# Patient Record
Sex: MALE | Race: WHITE
[De-identification: names, ages, dates, MRNs, and addresses within clinical notes are randomized per-mention and may not be internally consistent; named-entity substitution may affect disease eponyms.]

---

## 2020-08-28 ENCOUNTER — HOSPITAL ENCOUNTER (INPATIENT)
Dept: HOSPITAL 97 - ER | Age: 50
LOS: 11 days | Discharge: HOME | DRG: 871 | End: 2020-09-08
Attending: HOSPITALIST | Admitting: HOSPITALIST
Payer: COMMERCIAL

## 2020-08-28 VITALS — BODY MASS INDEX: 16.7 KG/M2

## 2020-08-28 DIAGNOSIS — F15.10: ICD-10-CM

## 2020-08-28 DIAGNOSIS — E27.40: ICD-10-CM

## 2020-08-28 DIAGNOSIS — G92: ICD-10-CM

## 2020-08-28 DIAGNOSIS — E87.6: ICD-10-CM

## 2020-08-28 DIAGNOSIS — K44.9: ICD-10-CM

## 2020-08-28 DIAGNOSIS — R13.10: ICD-10-CM

## 2020-08-28 DIAGNOSIS — R79.89: ICD-10-CM

## 2020-08-28 DIAGNOSIS — Z20.828: ICD-10-CM

## 2020-08-28 DIAGNOSIS — D63.8: ICD-10-CM

## 2020-08-28 DIAGNOSIS — E11.649: ICD-10-CM

## 2020-08-28 DIAGNOSIS — N39.0: ICD-10-CM

## 2020-08-28 DIAGNOSIS — A41.9: Primary | ICD-10-CM

## 2020-08-28 DIAGNOSIS — E83.42: ICD-10-CM

## 2020-08-28 DIAGNOSIS — E83.51: ICD-10-CM

## 2020-08-28 DIAGNOSIS — K26.9: ICD-10-CM

## 2020-08-28 DIAGNOSIS — J96.00: ICD-10-CM

## 2020-08-28 DIAGNOSIS — E43: ICD-10-CM

## 2020-08-28 DIAGNOSIS — K29.80: ICD-10-CM

## 2020-08-28 DIAGNOSIS — D61.82: ICD-10-CM

## 2020-08-28 DIAGNOSIS — Z96.41: ICD-10-CM

## 2020-08-28 DIAGNOSIS — T43.621A: ICD-10-CM

## 2020-08-28 DIAGNOSIS — B96.20: ICD-10-CM

## 2020-08-28 DIAGNOSIS — K25.9: ICD-10-CM

## 2020-08-28 DIAGNOSIS — K22.2: ICD-10-CM

## 2020-08-28 DIAGNOSIS — R65.20: ICD-10-CM

## 2020-08-28 DIAGNOSIS — F10.10: ICD-10-CM

## 2020-08-28 DIAGNOSIS — J69.0: ICD-10-CM

## 2020-08-28 LAB
ALBUMIN SERPL BCP-MCNC: 2.6 G/DL (ref 3.4–5)
ALP SERPL-CCNC: 538 U/L (ref 45–117)
ALT SERPL W P-5'-P-CCNC: 105 U/L (ref 12–78)
AST SERPL W P-5'-P-CCNC: 71 U/L (ref 15–37)
BUN BLD-MCNC: 11 MG/DL (ref 7–18)
GIANT PLATELETS BLD QL SMEAR: PRESENT
GLUCOSE SERPLBLD-MCNC: 86 MG/DL (ref 74–106)
HCT VFR BLD CALC: 36.1 % (ref 39.6–49)
INR BLD: 1.19
LIPASE SERPL-CCNC: 17 U/L (ref 73–393)
LYMPHOCYTES # SPEC AUTO: 0.2 K/UL (ref 0.7–4.9)
MAGNESIUM SERPL-MCNC: 1.7 MG/DL (ref 1.8–2.4)
MORPHOLOGY BLD-IMP: (no result)
NT-PROBNP SERPL-MCNC: 487 PG/ML (ref ?–125)
PMV BLD: 7.3 FL (ref 7.6–11.3)
POTASSIUM SERPL-SCNC: 3.1 MMOL/L (ref 3.5–5.1)
RBC # BLD: 4.01 M/UL (ref 4.33–5.43)
TROPONIN (EMERG DEPT USE ONLY): < 0.02 NG/ML (ref 0–0.04)

## 2020-08-28 PROCEDURE — 83735 ASSAY OF MAGNESIUM: CPT

## 2020-08-28 PROCEDURE — 86901 BLOOD TYPING SEROLOGIC RH(D): CPT

## 2020-08-28 PROCEDURE — 93306 TTE W/DOPPLER COMPLETE: CPT

## 2020-08-28 PROCEDURE — 84132 ASSAY OF SERUM POTASSIUM: CPT

## 2020-08-28 PROCEDURE — 83615 LACTATE (LD) (LDH) ENZYME: CPT

## 2020-08-28 PROCEDURE — 36415 COLL VENOUS BLD VENIPUNCTURE: CPT

## 2020-08-28 PROCEDURE — 80307 DRUG TEST PRSMV CHEM ANLYZR: CPT

## 2020-08-28 PROCEDURE — 87040 BLOOD CULTURE FOR BACTERIA: CPT

## 2020-08-28 PROCEDURE — 93005 ELECTROCARDIOGRAM TRACING: CPT

## 2020-08-28 PROCEDURE — 84075 ASSAY ALKALINE PHOSPHATASE: CPT

## 2020-08-28 PROCEDURE — 99285 EMERGENCY DEPT VISIT HI MDM: CPT

## 2020-08-28 PROCEDURE — 85730 THROMBOPLASTIN TIME PARTIAL: CPT

## 2020-08-28 PROCEDURE — 83525 ASSAY OF INSULIN: CPT

## 2020-08-28 PROCEDURE — 82746 ASSAY OF FOLIC ACID SERUM: CPT

## 2020-08-28 PROCEDURE — 85044 MANUAL RETICULOCYTE COUNT: CPT

## 2020-08-28 PROCEDURE — 83540 ASSAY OF IRON: CPT

## 2020-08-28 PROCEDURE — 83605 ASSAY OF LACTIC ACID: CPT

## 2020-08-28 PROCEDURE — 87077 CULTURE AEROBIC IDENTIFY: CPT

## 2020-08-28 PROCEDURE — 84439 ASSAY OF FREE THYROXINE: CPT

## 2020-08-28 PROCEDURE — 31500 INSERT EMERGENCY AIRWAY: CPT

## 2020-08-28 PROCEDURE — 71045 X-RAY EXAM CHEST 1 VIEW: CPT

## 2020-08-28 PROCEDURE — 87070 CULTURE OTHR SPECIMN AEROBIC: CPT

## 2020-08-28 PROCEDURE — 92611 MOTION FLUOROSCOPY/SWALLOW: CPT

## 2020-08-28 PROCEDURE — 84145 PROCALCITONIN (PCT): CPT

## 2020-08-28 PROCEDURE — 83010 ASSAY OF HAPTOGLOBIN QUANT: CPT

## 2020-08-28 PROCEDURE — 82024 ASSAY OF ACTH: CPT

## 2020-08-28 PROCEDURE — 85025 COMPLETE CBC W/AUTO DIFF WBC: CPT

## 2020-08-28 PROCEDURE — 82947 ASSAY GLUCOSE BLOOD QUANT: CPT

## 2020-08-28 PROCEDURE — 86900 BLOOD TYPING SEROLOGIC ABO: CPT

## 2020-08-28 PROCEDURE — 81015 MICROSCOPIC EXAM OF URINE: CPT

## 2020-08-28 PROCEDURE — 87086 URINE CULTURE/COLONY COUNT: CPT

## 2020-08-28 PROCEDURE — 84681 ASSAY OF C-PEPTIDE: CPT

## 2020-08-28 PROCEDURE — 51702 INSERT TEMP BLADDER CATH: CPT

## 2020-08-28 PROCEDURE — 80202 ASSAY OF VANCOMYCIN: CPT

## 2020-08-28 PROCEDURE — 74177 CT ABD & PELVIS W/CONTRAST: CPT

## 2020-08-28 PROCEDURE — 97161 PT EVAL LOW COMPLEX 20 MIN: CPT

## 2020-08-28 PROCEDURE — 85018 HEMOGLOBIN: CPT

## 2020-08-28 PROCEDURE — 85014 HEMATOCRIT: CPT

## 2020-08-28 PROCEDURE — 82607 VITAMIN B-12: CPT

## 2020-08-28 PROCEDURE — 84443 ASSAY THYROID STIM HORMONE: CPT

## 2020-08-28 PROCEDURE — 87205 SMEAR GRAM STAIN: CPT

## 2020-08-28 PROCEDURE — 97530 THERAPEUTIC ACTIVITIES: CPT

## 2020-08-28 PROCEDURE — 80053 COMPREHEN METABOLIC PANEL: CPT

## 2020-08-28 PROCEDURE — 87088 URINE BACTERIA CULTURE: CPT

## 2020-08-28 PROCEDURE — 83690 ASSAY OF LIPASE: CPT

## 2020-08-28 PROCEDURE — 80074 ACUTE HEPATITIS PANEL: CPT

## 2020-08-28 PROCEDURE — 80329 ANALGESICS NON-OPIOID 1 OR 2: CPT

## 2020-08-28 PROCEDURE — 84484 ASSAY OF TROPONIN QUANT: CPT

## 2020-08-28 PROCEDURE — 82805 BLOOD GASES W/O2 SATURATION: CPT

## 2020-08-28 PROCEDURE — 94660 CPAP INITIATION&MGMT: CPT

## 2020-08-28 PROCEDURE — 80076 HEPATIC FUNCTION PANEL: CPT

## 2020-08-28 PROCEDURE — 86850 RBC ANTIBODY SCREEN: CPT

## 2020-08-28 PROCEDURE — 87389 HIV-1 AG W/HIV-1&-2 AB AG IA: CPT

## 2020-08-28 PROCEDURE — 97116 GAIT TRAINING THERAPY: CPT

## 2020-08-28 PROCEDURE — 94002 VENT MGMT INPAT INIT DAY: CPT

## 2020-08-28 PROCEDURE — 87186 SC STD MICRODIL/AGAR DIL: CPT

## 2020-08-28 PROCEDURE — 82533 TOTAL CORTISOL: CPT

## 2020-08-28 PROCEDURE — 83880 ASSAY OF NATRIURETIC PEPTIDE: CPT

## 2020-08-28 PROCEDURE — 94003 VENT MGMT INPAT SUBQ DAY: CPT

## 2020-08-28 PROCEDURE — 80048 BASIC METABOLIC PNL TOTAL CA: CPT

## 2020-08-28 PROCEDURE — 85610 PROTHROMBIN TIME: CPT

## 2020-08-28 PROCEDURE — 74230 X-RAY XM SWLNG FUNCJ C+: CPT

## 2020-08-28 PROCEDURE — 84100 ASSAY OF PHOSPHORUS: CPT

## 2020-08-28 PROCEDURE — 87522 HEPATITIS C REVRS TRNSCRPJ: CPT

## 2020-08-28 PROCEDURE — 84134 ASSAY OF PREALBUMIN: CPT

## 2020-08-28 PROCEDURE — 81003 URINALYSIS AUTO W/O SCOPE: CPT

## 2020-08-28 PROCEDURE — 80320 DRUG SCREEN QUANTALCOHOLS: CPT

## 2020-08-28 NOTE — XMS REPORT
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                                                    [facility]



     Phosphate      03:51:                                              Elizabe



     (Tylenol      00                                                th



     #4) 1 Each                                                        



     TAB                                                         

 

     Dicyclomine      2020-0      No             10mg                     [facility]

[facility]



     Hcl       8-21                                              [facility]



     (Bentyl) 10      02:21:                                              [facility]

e



     Mg CAP      00                                                th

 

     Omeprazole      2020-0      No             20mg                     [facility]



     (Prilosec)      8-21                                              [facility]



     20 Mg CPDR      02:21:                                              [facility]



               00                                                th

 

     Ondansetron      2020-0      No             4mg                      [facility]

[facility]



     Hcl       8-21                                              [facility]



     (Zofran) 4      02:21:                                              [facility]



     Mg TAB      00                                                th

 

     Insulin      2020-0      No             20                       [facility]



     Glargine      4-20                                              [facility]



     (Lantus      07:34:                                              [facility]



     U-100) 100      00                                                th



     Unit/1 Ml                                                        



     SOLN                                                        

 

     Pantoprazol      2020-0      No             40mg                     [facility]

[facility]



     e         4-20                                              [facility]



     (Protonix)      07:34:                                              [facility]



     40 Mg TABEC      00                                                th

 

     Insulin      2020-0      No             20                       [facility]



     Glargine      4-20                                              [facility]



     (Lantus      07:34:                                              [facility]



     U-100) 100      00                                                th



     Unit/1 Ml                                                        



     SOLN                                                        

 

     Pantoprazol      2020-0      No             40mg                     [facility]

[facility]



     e         4-20                                              [facility]



     (Protonix)      07:34:                                              [facility]



     40 Mg TABEC      00                                                th

 

     Insulin      2020-0      No             20                       [facility]



     Glargine      4-20                                              [facility]



     (Lantus      07:34:                                              [facility]



     U-100) 100      00                                                th



     Unit/1 Ml                                                        



     SOLN                                                        

 

     Pantoprazol      2020-0      No             40mg                     [facility]

[facility]



     e         4-20                                              [facility]



     (Protonix)      07:34:                                              [facility]



     40 Mg TABEC      00                                                th

 

     Acetaminoph      2020-0      No             1                        [facility]

[facility]



     en/Hydrocod      4-04                                              [facility]



     one Bitart      09:57:                                              [facility]



     (Norco      ) 1                                                        



     Tab TAB                                                        

 

     Acetaminoph      2020-0      No             1                        [facility]

[facility]



     en/Hydrocod      4-04                                              [facility]



     one Bitart      09:57:                                              [facility]



     (Norco      ) 1                                                        



     Tab TAB                                                        

 

     Acetaminoph      2020-0      No             1                        [facility]

[facility]



     en/Hydrocod      4-04                                              [facility]



     one Bitart      09:57:                                              [facility]



     (Norco      ) 1                                                        



     Tab TAB                                                        

 

     Acetaminoph      2020-0      No             1                        [facility]

[facility]



     en/Hydrocod      4-04                                              [facility]



     one Bitart      09:57:                                              [facility]



     (Norco      00                                                th



     5/325) 1                                                        



     Tab TAB                                                        

 

     Aspirin      2020-0      No             81mg                     [facility]



     (Aspirin      4-04                                              [facility]



     Chewable)      09:56:                                              [facility]



     81 Mg CHEW      00                                                th

 

     Clopidogrel      2020-0      No             75mg                     [facility]

[facility]



     Bisulfate      4-04                                              [facility]



     (Plavix) 75      09:56:                                              [facility]

e



     Mg TAB      00                                                th

 

     Metoprolol      2020-0      No             25mg                     [facility]



     Succinate      4-04                                              [facility]



     (Toprol Xl)      09:56:                                              [facility]

e



     25 Mg TABSR      00                                                th

 

     Saccharomyc      2020-0      No             250mg                     [facility]

TU



     es        4-04                                              [facility]



     Boulardii      09:56:                                              [facility]



     (Florastor)      00                                                th



     250 Mg                                                        



     CAPSULE                                                        

 

     Vancomycin      2020-0      No             125mg                     [facility]

[facility]



     Hcl       4-04                                              [facility]



     (Vancocin)      09:56:                                              [facility]



     125 Mg      00                                                th



     CAPSULE                                                        

 

     Aspirin      2020-0      No             81mg                     [facility]



     (Aspirin      4-04                                              [facility]



     Chewable)      09:56:                                              [facility]



     81 Mg CHEW      00                                                th

 

     Clopidogrel      2020-0      No             75mg                     [facility]

[facility]



     Bisulfate      4-04                                              [facility]



     (Plavix) 75      09:56:                                              [facility]

e



     Mg TAB      00                                                th

 

     Metoprolol      2020-0      No             25mg                     [facility]



     Succinate      4-04                                              [facility]



     (Toprol Xl)      09:56:                                              [facility]

e



     25 Mg TABSR      00                                                th

 

     Saccharomyc      2020-0      No             250mg                     [facility]

TU



     es        4-04                                              [facility]



     Boulardii      09:56:                                              [facility]



     (Florastor)      00                                                th



     250 Mg                                                        



     CAPSULE                                                        

 

     Aspirin      2020-0      No             81mg                     [facility]



     (Aspirin      4-04                                              [facility]



     Chewable)      09:56:                                              [facility]



     81 Mg CHEW      00                                                th

 

     Clopidogrel      2020-0      No             75mg                     [facility]

[facility]



     Bisulfate      4-04                                              [facility]



     (Plavix) 75      09:56:                                              [facility]

e



     Mg TAB      00                                                th

 

     Metoprolol      2020-0      No             25mg                     [facility]



     Succinate      4-04                                              [facility]



     (Toprol Xl)      09:56:                                              [facility]

e



     25 Mg TABSR      00                                                th

 

     Saccharomyc      2020-0      No             250mg                     [facility]

TU



     es        4-04                                              [facility]



     Boulardii      09:56:                                              [facility]



     (Florastor)      00                                                th



     250 Mg                                                        



     CAPSULE                                                        

 

     Aspirin      2020-0      No             81mg                     [facility]



     (Aspirin      -04                                              [facility]



     Chewable)      09:56:                                              [facility]



     81 Mg CHEW      00                                                th

 

     Clopidogrel      -0      No             75mg                     [facility]

[facility]



     Bisulfate      -04                                              [facility]



     (Plavix) 75      09:56:                                              [facility]

e



     Mg TAB      00                                                th

 

     Metoprolol      -0      No             25mg                     [facility]



     Succinate      4-04                                              [facility]



     (Toprol Xl)      09:56:                                              [facility]

e



     25 Mg TABSR                                                      th

 

     Saccharomyc      -0      No             250mg                     [facility]

TU



     es        4-04                                              [facility]



     Boulardii      09:56:                                              [facility]



     (Florastor)      



     250 Mg                                                        



     CAPSULE                                                        

 

     Acetaminoph      -0      No             1                        [facility]

[facility]



     en/Codeine      2-05                                              [facility]



     Phosphate      10:40:                                              [facility]



     (Tylenol      



     #3) 1 Tab                                                        



     TAB                                                         

 

     Fluoxetine      -0      No             40mg                     [facility]



     Hcl       1-17                                              [facility]



     (Prozac) 20      08:56:                                              [facility]

e



     Mg CAP      00                                                

 

     Fluoxetine      -0      No             40mg                     [facility]



     Hcl       1-17                                              [facility]



     (Prozac) 20      08:56:                                              [facility]

e



     Mg CAP      00                                                

 

     Fluoxetine      -0      No             40mg                     [facility]



     Hcl       1-17                                              [facility]



     (Prozac) 20      08:56:                                              [facility]

e



     Mg CAP      00                                                th

 

     Fluoxetine      -0      No             40mg                     [facility]



     Hcl       1-17                                              [facility]



     (Prozac) 20      08:56:                                              [facility]

e



     Mg CAP      

 

     Fluoxetine      -0      No             40mg                     [facility]



     Hcl       1-17                                              [facility]



     (Prozac) 20      08:56:                                              [facility]

e



     Mg CAP      00                                                

 

     Fluoxetine      -0      No             40mg                     [facility]



     Hcl       1-17                                              [facility]



     (Prozac) 20      08:56:                                              [facility]

e



     Mg CAP      00                                                

 

     Fluoxetine      -0      No             40mg                     [facility]



     Hcl       1-17                                              [facility]



     (Prozac) 20      08:56:                                              [facility]

e



     Mg CAP      00                                                th

 

     Amoxicillin      2019      No             875mg                     [facility]

TU



     /Clavulanat      2-02                                              [facility]



     e Potassium      11:29:                                              [facility]

e



     (Augmentin      



     875 Mg) 1                                                        



     Each TABLET                                                        

 

     Insulin      2019      No             30                       [facility]



     Glargine      2-02                                              [facility]



     (Lantus      11:14:                                              [facility]



     U-100) 100      00                                                th



     Unit/1 Ml                                                        



     SOLN                                                        

 

     Insulin      2019-      No             5                        [facility]



     Human      2-02                                              [facility]



     Lispro      11:14:                                              [facility]



     (Humalog      00                                                th



     Inj (3ML                                                        



     Vial)) 100                                                        



     Unit/1 Ml                                                        



     INJ                                                         

 

     Insulin      2019-      No             30                       [facility]



     Glargine      2-02                                              [facility]



     (Lantus      11:14:                                              [facility]



     U-100) 100      00                                                th



     Unit/1 Ml                                                        



     SOLN                                                        

 

     Insulin      2019-      No             5                        [facility]



     Human      2-02                                              [facility]



     Lispro      11:14:                                              [facility]



     (Humalog      00                                                th



     Inj (3ML                                                        



     Vial)) 100                                                        



     Unit/1 Ml                                                        



     INJ                                                         

 

     Insulin      2019-      No             30                       [facility]



     Glargine      2-02                                              [facility]



     (Lantus      11:14:                                              [facility]



     U-100) 100      00                                                th



     Unit/1 Ml                                                        



     SOLN                                                        

 

     Insulin      -      No             5                        [facility]



     Human      2-02                                              [facility]



     Lispro      11:14:                                              [facility]



     (Humalog      00                                                th



     Inj (3ML                                                        



     Vial)) 100                                                        



     Unit/1 Ml                                                        



     INJ                                                         

 

     Insulin      2019-      No             30                       [facility]



     Glargine      2-02                                              [facility]



     (Lantus      11:14:                                              [facility]



     U-100) 100      00                                                th



     Unit/1 Ml                                                        



     SOLN                                                        

 

     Insulin      2019-      No             5                        [facility]



     Human      2-02                                              [facility]



     Lispro      11:14:                                              [facility]



     (Humalog      00                                                th



     Inj (3ML                                                        



     Vial)) 100                                                        



     Unit/1 Ml                                                        



     INJ                                                         

 

     Insulin      -      No             30                       [facility]



     Glargine      2-02                                              [facility]



     (Lantus      11:14:                                              [facility]



     U-100) 100      00                                                th



     Unit/1 Ml                                                        



     SOLN                                                        

 

     Insulin      2019-      No             5                        [facility]



     Human      2-02                                              [facility]



     Lispro      11:14:                                              [facility]



     (Humalog      00                                                th



     Inj (3ML                                                        



     Vial)) 100                                                        



     Unit/1 Ml                                                        



     INJ                                                         

 

     Insulin      2019-      No             5                        [facility]



     Human      2-02                                              [facility]



     Lispro      11:14:                                              [facility]



     (Humalog      00                                                th



     Inj (3ML                                                        



     Vial)) 100                                                        



     Unit/1 Ml                                                        



     INJ                                                         

 

     Insulin      -      No             5                        [facility]



     Human      2-02                                              [facility]



     Lispro      11:14:                                              [facility]



     (Humalog      00                                                th



     Inj (3ML                                                        



     Vial)) 100                                                        



     Unit/1 Ml                                                        



     INJ                                                         

 

     Insulin      2019-      No             5                        [facility]



     Human      2-02                                              [facility]



     Lispro      11:14:                                              [facility]



     (Humalog      



     Inj (3ML                                                        



     Vial)) 100                                                        



     Unit/1 Ml                                                        



     INJ                                                         

 

     Ondansetron      2013-0      No             4mg                      [facility]

[facility]



     Hcl       8-20                                              [facility]



     (Zofran) 4      19:17:                                              [facility]



     Mg TAB      00                                                th

 

     Ondansetron      2013-0      No             4mg                      [facility]

[facility]



     Hcl       8-20                                              [facility]



     (Zofran) 4      19:17:                                              [facility]



     Mg TAB                                                      th

 

     Ondansetron      2013-0      No             4mg                      [facility]

[facility]



     Hcl       8-20                                              [facility]



     (Zofran) 4      19:17:                                              [facility]



     Mg TAB      

 

     Ondansetron      2013-0      No             4mg                      [facility]

[facility]



     Hcl       8-20                                              [facility]



     (Zofran) 4      19:17:                                              [facility]



     Mg TAB                                                      th

 

     Ondansetron      2013-0      No             4mg                      [facility]

[facility]



     Hcl       8-20                                              [facility]



     (Zofran) 4      19:17:                                              [facility]



     Mg TAB      

 

     Ondansetron      2013-0      No             4mg                      [facility]

[facility]



     Hcl       8-20                                              [facility]



     (Zofran) 4      19:17:                                              [facility]



     Mg TAB      

 

     Ondansetron      2013-0      No             4mg                      [facility]

[facility]



     Hcl       8-20                                              [facility]



     (Zofran) 4      19:17:                                              [facility]



     Mg TAB      

 

     Ondansetron      2013-0      No             4mg                      [facility]

[facility]



     Hcl       8-20                                              [facility]



     (Zofran) 4      19:17:                                              [facility]



     Mg TAB      

 

     Acetaminoph      -0      No             1                        [facility]

[facility]



     en/Hydrocod      3-25                                              [facility]



     one Bitart      16:02:                                              [facility]



     (Norco      ) 1                                                        



     Tab TAB                                                        

 

     Acetaminoph      -0      No             1                        [facility]

[facility]



     en/Hydrocod      3-25                                              [facility]



     one Bitart      16:02:                                              [facility]



     (Norco      ) 1                                                        



     Tab TAB                                                        

 

     Acetaminoph      -0      No             1                        [facility]

[facility]



     en/Hydrocod      3-25                                              [facility]



     one Bitart      16:02:                                              [facility]



     (Norco      ) 1                                                        



     Tab TAB                                                        

 

     Acetaminoph      -0      No             1                        [facility]

U



     en/Hydrocod      3-25                                              [facility]



     one Bitart      16:02:                                              [facility]



     (Norco      ) 1                                                        



     Tab TAB                                                        

 

     Amlodipine                No             10mg                     [facility]



     Besylate                                                        [facility]



     (Norvasc)                                                        [facility]



     10 Mg TAB                                                        th

 

     Fluoxetine                No             20mg                     [facility]



     Hcl                                                         [facility]



     (Prozac) 20                                                        [facility]



     Mg CAP                                                        th

 

     Gabapentin                No             600mg                     [facility]



     (Neurontin)                                                        [facility]



     600 Mg TAB                                                        [facility]



                                                                 th

 

     Hydroxychlo                No             200mg                     [facility]



     roquine                                                        [facility]



     Sulfate                                                        [facility]



     (Plaquenil)                                                        th



     200 Mg TAB                                                        

 

     Lisinopril                No             40mg                     [facility]



     (Zestril)                                                        [facility]



     40 Mg TAB                                                        [facility]



                                                                 th

 

     Meloxicam                No             15mg                     [facility]



     (Mobic) 15                                                        [facility]



     Mg TAB                                                        [facility]



                                                                 th

 

     Simvastatin                No             40mg                     [facility]



     (Zocor) 40                                                        [facility]



     Mg TAB                                                        [facility]



                                                                 th

 

     Trazodone                No             50mg                     [facility]



     Hcl                                                         [facility]



     (Desyrel)                                                        [facility]



     50 Mg TAB                                                        th

 

     Amlodipine                No             10mg                     [facility]



     Besylate                                                        [facility]



     (Norvasc)                                                        [facility]



     10 Mg TAB                                                        th

 

     Gabapentin                No             600mg                     [facility]



     (Neurontin)                                                        [facility]



     600 Mg TAB                                                        [facility]



                                                                 th

 

     Hydroxychlo                No             200mg                     [facility]



     roquine                                                        [facility]



     Sulfate                                                        [facility]



     (Plaquenil)                                                        th



     200 Mg TAB                                                        

 

     Lisinopril                No             40mg                     [facility]



     (Zestril)                                                        [facility]



     40 Mg TAB                                                        [facility]



                                                                 th

 

     Simvastatin                No             40mg                     [facility]



     (Zocor) 40                                                        [facility]



     Mg TAB                                                        [facility]



                                                                 th

 

     Trazodone                No             50mg                     [facility]



     Hcl                                                         [facility]



     (Desyrel)                                                        [facility]



     50 Mg TAB                                                        th

 

     Amlodipine                No             10mg                     [facility]



     Besylate                                                        [facility] - [facility]



     (Norvasc)                                                        [facility]



     10 Mg TAB                                                        th

 

     Gabapentin                No             600mg                     [facility]



     (Neurontin)                                                        [facility]



     600 Mg TAB                                                        [facility]



                                                                 th

 

     Hydroxychlo                No             200mg                     [facility]



     roquine                                                        [facility] - [facility]



     Sulfate                                                        [facility]



     (Plaquenil)                                                        th



     200 Mg TAB                                                        

 

     Lisinopril                No             40mg                     [facility]



     (Zestril)                                                        [facility]



     40 Mg TAB                                                        [facility]



                                                                 th

 

     Simvastatin                No             40mg                     [facility]



     (Zocor) 40                                                        [facility]



     Mg TAB                                                        [facility]



                                                                 th

 

     Trazodone                No             50mg                     [facility]



     Hcl                                                         [facility] - [facility]



     (Desyrel)                                                        [facility]



     50 Mg TAB                                                        th

 

     Amlodipine                No             10mg                     [facility]



     Besylate                                                        [facility] - [facility]



     (Norvasc)                                                        [facility]



     10 Mg TAB                                                        th

 

     Gabapentin                No             600mg                     [facility]



     (Neurontin)                                                        [facility]



     600 Mg TAB                                                        [facility]



                                                                 th

 

     Hydroxychlo                No             200mg                     [facility]



     roquine                                                        [facility] - [facility]



     Sulfate                                                        [facility]



     (Plaquenil)                                                        th



     200 Mg TAB                                                        

 

     Lisinopril                No             40mg                     [facility]



     (Zestril)                                                        [facility]



     40 Mg TAB                                                        [facility]



                                                                 th

 

     Simvastatin                No             40mg                     [facility]



     (Zocor) 40                                                        [facility]



     Mg TAB                                                        [facility]



                                                                 th

 

     Trazodone                No             50mg                     [facility]



     Hcl                                                         [facility] - [facility]



     (Desyrel)                                                        [facility]



     50 Mg TAB                                                        th

 

     Amlodipine                No             10mg                     [facility]



     Besylate                                                        [facility]



     (Norvasc)                                                        [facility]



     10 Mg TAB                                                        th

 

     Gabapentin                No             600mg                     [facility]



     (Neurontin)                                                        [facility]



     600 Mg TAB                                                        [facility]



                                                                 th

 

     Hydroxychlo                No             200mg                     [facility]



     roquine                                                        [facility] - [facility]



     Sulfate                                                        [facility]



     (Plaquenil)                                                        th



     200 Mg TAB                                                        

 

     Simvastatin                No             40mg                     [facility]



     (Zocor) 40                                                        [facility]



     Mg TAB                                                        [facility]



                                                                 th

 

     Trazodone                No             50mg                     [facility]



     Hcl                                                         [facility]



     (Desyrel)                                                        [facility]



     50 Mg TAB                                                        th

 

     Amlodipine                No             10mg                     [facility]



     Besylate                                                        [facility]



     (Norvasc)                                                        [facility]



     10 Mg TAB                                                        th

 

     Gabapentin                No             600mg                     [facility]



     (Neurontin)                                                        [facility]



     600 Mg TAB                                                        [facility]



                                                                 th

 

     Hydroxychlo                No             200mg                     [facility]



     roquine                                                        [facility] - [facility]



     Sulfate                                                        [facility]



     (Plaquenil)                                                        th



     200 Mg TAB                                                        

 

     Simvastatin                No             40mg                     [facility]



     (Zocor) 40                                                        [facility]



     Mg TAB                                                        [facility]



                                                                 th

 

     Trazodone                No             50mg                     [facility]



     Hcl                                                         [facility] - [facility]



     (Desyrel)                                                        [facility]



     50 Mg TAB                                                        th

 

     Amlodipine                No             10mg                     [facility]



     Besylate                                                        [facility] - [facility]



     (Norvasc)                                                        [facility]



     10 Mg TAB                                                        th

 

     Gabapentin                No             600mg                     [facility]



     (Neurontin)                                                        [facility]



     600 Mg TAB                                                        [facility]



                                                                 th

 

     Hydroxychlo                No             200mg                     [facility]



     roquine                                                        [facility] - [facility]



     Sulfate                                                        [facility]



     (Plaquenil)                                                        th



     200 Mg TAB                                                        

 

     Meloxicam                No             15mg                     [facility]



     (Mobic) 15                                                        [facility]



     Mg TAB                                                        [facility]



                                                                 th

 

     Simvastatin                No             40mg                     [facility]



     (Zocor) 40                                                        [facility]



     Mg TAB                                                        [facility]



                                                                 th

 

     Trazodone                No             50mg                     [facility]



     Hcl                                                         [facility] - [facility]



     (Desyrel)                                                        [facility]



     50 Mg TAB                                                        th

 

     Vancomycin                No             125mg                     [facility]



     Hcl                                                         [facility]



     (Vancocin)                                                        [facility]



     125 Mg                                                        th



     CAPSULE                                                        

 

     Amlodipine                No             10mg                     [facility]



     Besylate                                                        [facility]



     (Norvasc)                                                        [facility]



     10 Mg TAB                                                        th

 

     Gabapentin                No             600mg                     [facility]



     (Neurontin)                                                        [facility]



     600 Mg TAB                                                        [facility]



                                                                 th

 

     Hydroxychlo                No             200mg                     [facility]



     roquine                                                        [facility] - [facility]



     Sulfate                                                        [facility]



     (Plaquenil)                                                        th



     200 Mg TAB                                                        

 

     Meloxicam                No             15mg                     [facility]



     (Mobic) 15                                                        [facility]



     Mg TAB                                                        [facility]



                                                                 th

 

     Simvastatin                No             40mg                     [facility]



     (Zocor) 40                                                        [facility]



     Mg TAB                                                        [facility]



                                                                 th

 

     Trazodone                No             50mg                     [facility]



     Hcl                                                         [facility] - [facility]



     (Desyrel)                                                        [facility]



     50 Mg TAB                                                        th

 

     Vancomycin                No             125mg                     [facility]



     Hcl                                                         [facility] - [facility]



     (Vancocin)                                                        [facility]



     125 Mg                                                        th



     CAPSULE                                                        







Vital Signs







             Vital Name   Observation Time Observation Value Comments     Source

 

             Body Temperature 2020 04:54:00 98.1 [degF]               [facility]



                                                                 [place]

 

             Heart Rate   2020 04:54:00 76 /min                   [facility]



                                                                 [place]

 

             Respiratory rate 2020 04:54:00 19 /min                   [facility] - [facility]



                                                                 [place]

 

             Weight       2020 22:12:00 107.25 [lb_av]              [facility]

 

             BMI (Body Mass 2020 22:12:00 17.8 kg/m2                [facility]



             Index)                                              [place]

 

             Heart Rate   2020 21:53:00 68 /min                   [facility]

 - St.



                                                                 [place]

 

             Respiratory rate 2020 21:36:00 14 /min                   [facility] - [facility]



                                                                 [place]

 

             Body Temperature 2020 16:00:00 98.5 [degF]               [facility] - [facility]



                                                                 [place]

 

             Heart Rate   2020 16:00:00 80 /min                   [facility]

 - St.



                                                                 [place]

 

             Respiratory rate 2020 16:00:00 19 /min                   [facility] - [facility]



                                                                 [place]

 

             BP Systolic  2020 16:00:00 147 mm[Hg]                [facility]

 - [facility]



                                                                 [place]

 

             BP Diastolic 2020 16:00:00 88 mm[Hg]                 [facility]

 - [facility]



                                                                 [place]

 

             Respiratory rate 2020 03:00:00 18 /min                   [facility]

[facility]



                                                                 [place]

 

             Weight       2020 04:00:00 112.50 [lb_av]              [facility]

[facility]



                                                                 [place]

 

             BMI (Body Mass 2020 04:00:00 18.7 kg/m2                [facility] - St[facility]



             Index)                                              [place]

 

             BP Systolic  2020 08:52:00 118 mm[Hg]                [facility]

 - [facility]



                                                                 [place]

 

             BP Diastolic 2020 08:52:00 71 mm[Hg]                 [facility]

 - [facility]



                                                                 [place]

 

             Heart Rate   2020 01:32:00 76 /min                   [facility]

 - [facility]



                                                                 [place]

 

             BP Systolic  2020 01:32:00 105 mm[Hg]                [facility]

 - [facility]



                                                                 [place]

 

             BP Diastolic 2020 01:32:00 64 mm[Hg]                 [facility]

 - [facility]



                                                                 [place]

 

             Body Temperature 2020 07:45:00 98.4 [degF]               [facility]

[facility]



                                                                 [place]

 

             Heart Rate   2020 07:45:00 90 /min                   [facility]

 - [facility]



                                                                 [place]

 

             Respiratory rate 2020 07:45:00 18 /min                   [facility]

[facility]



                                                                 [place]

 

             BP Systolic  2020 07:45:00 123 mm[Hg]                [facility]

 - [facility]



                                                                 [place]

 

             BP Diastolic 2020 07:45:00 81 mm[Hg]                 [facility]

 - [facility]



                                                                 [place]

 

             Respiratory rate 2020 14:00:00 18 /min                   [facility]

[facility]



                                                                 [place]

 

             Weight       2020 04:00:00 122 [lb_av]               [facility]

 - [facility]



                                                                 [place]

 

             BMI (Body Mass 2020 04:00:00 20.3 kg/m2                [facility]



             Index)                                              [place]

 

             Heart Rate   2020 01:57:00 81 /min                   [facility]

 - [facility]



                                                                 [place]

 

             BP Systolic  2020 01:57:00 144 mm[Hg]                [facility]

 - [facility]



                                                                 [place]

 

             BP Diastolic 2020 01:57:00 75 mm[Hg]                 [facility]

 - [facility]



                                                                 [place]

 

             Body Temperature 2020 08:00:00 97.4 [degF]               [facility]

[facility]



                                                                 [place]

 

             Heart Rate   2020 08:00:00 70 /min                   [facility]

 - [facility]



                                                                 [place]

 

             Respiratory rate 2020 08:00:00 18 /min                   [facility]

[facility]



                                                                 [place]

 

             BP Systolic  2020 08:00:00 95 mm[Hg]                 [facility]

 - [facility]



                                                                 [place]

 

             BP Diastolic 2020 08:00:00 58 mm[Hg]                 [facility]

 - [facility]



                                                                 [place]

 

             Respiratory rate 2020 22:00:00 15 /min                   [facility]

[facility]



                                                                 [place]

 

             Weight       2020 04:00:00 110.31 [lb_av]              [facility]

[facility]



                                                                 [place]

 

             BMI (Body Mass 2020 04:00:00 18.4 kg/m2                [facility]

[facility]



             Index)                                              [place]

 

             Heart Rate   2020 00:05:00 98 /min                   [facility]

 - [facility]



                                                                 [place]

 

             BP Systolic  2020 00:05:00 103 mm[Hg]                [facility]

 - [facility]



                                                                 [place]

 

             BP Diastolic 2020 00:05:00 60 mm[Hg]                 [facility]

 - [facility]



                                                                 [place]

 

             Body Temperature 2020 15:17:00 99.2 [degF]               [facility]



                                                                 [place]

 

             Heart Rate   2020 15:17:00 92 /min                   [facility]



                                                                 [place]

 

             BP Systolic  2020 15:17:00 132 mm[Hg]                [facility]



                                                                 [place]

 

             BP Diastolic 2020 15:17:00 66 mm[Hg]                 [facility]



                                                                 [place]

 

             Respiratory rate 2020 00:00:00 18 /min                   [facility]



                                                                 [place]

 

             Respiratory rate 2020 16:00:00 16 /min                   [facility]



                                                                 [place]

 

             Weight       2020 04:00:00 125.56 [lb_av]              [facility]



                                                                 [place]

 

             BMI (Body Mass 2020 04:00:00 20.3 kg/m2                [facility]



             Index)                                              [place]

 

             Heart Rate   2020 21:35:00 67 /min                   [facility]



                                                                 [place]

 

             BP Systolic  2020 21:35:00 108 mm[Hg]                [facility]



                                                                 [place]

 

             BP Diastolic 2020 21:35:00 79 mm[Hg]                 [facility]



                                                                 [place]

 

             Body Temperature 2020 07:58:00 98.1 [degF]               [facility]



                                                                 [place]

 

             Heart Rate   2020 07:58:00 68 /min                   [facility]



                                                                 [place]

 

             Respiratory rate 2020 07:58:00 18 /min                   [facility]



                                                                 [place]

 

             BP Systolic  2020 07:58:00 181 mm[Hg]                [facility]



                                                                 [place]

 

             BP Diastolic 2020 07:58:00 98 mm[Hg]                 [facility]



                                                                 [place]

 

             Respiratory rate 2020 20:00:00 13 /min                   [facility]



                                                                 [place]

 

             BMI (Body Mass 2020 04:00:00 19.2 kg/m2                [facility]



             Index)                                              [place]

 

             Weight       2020 00:35:00 115.31 [lb_av]              [facility]



                                                                 [place]

 

             Heart Rate   2020 00:01:00 108 /min                  [facility]



                                                                 [place]

 

             BP Systolic  2020 00:01:00 188 mm[Hg]                [facility]



                                                                 [place]

 

             BP Diastolic 2020 00:01:00 84 mm[Hg]                 [facility]



                                                                 [place]

 

             Heart Rate   2020 10:13:00 86 /min                   [facility]



                                                                 [place]

 

             Respiratory rate 2020 10:13:00 18 /min                   [facility]



                                                                 [place]

 

             BP Systolic  2020 10:13:00 157 mm[Hg]                [facility]



                                                                 [place]

 

             BP Diastolic 2020 10:13:00 89 mm[Hg]                 [facility]



                                                                 [place]

 

             Body Temperature 2020 08:21:00 97.5 [degF]               [facility]



                                                                 [place]

 

             Respiratory rate 2020-01-15 15:00:00 13 /min                   [facility]



                                                                 [place]

 

             BMI (Body Mass 2020-01-15 04:00:00 20.2 kg/m2                [facility]



             Index)                                              [place]

 

             Weight       2020 22:30:00 121.25 [lb_av]              [facility]



                                                                 [place]

 

             Heart Rate   2020 22:16:00 90 /min                   [facility]



                                                                 [place]

 

             BP Systolic  2020 22:16:00 142 mm[Hg]                [facility]



                                                                 [place]

 

             BP Diastolic 2020 22:16:00 92 mm[Hg]                 [facility]



                                                                 [place]

 

             Body Temperature 2019 08:00:00 98.7 [degF]               [facility]



                                                                 [place]

 

             Heart Rate   2019 08:00:00 64 /min                   [facility]



                                                                 [place]

 

             Respiratory rate 2019 08:00:00 16 /min                   [facility]



                                                                 [place]

 

             BP Systolic  2019 08:00:00 111 mm[Hg]                [facility]



                                                                 [place]

 

             BP Diastolic 2019 08:00:00 73 mm[Hg]                 [facility]



                                                                 [place]

 

             Respiratory rate 2019 14:00:00 17 /min                   [facility]



                                                                 [place]

 

             BMI (Body Mass 2019 04:00:00 21.6 kg/m2                [facility]



             Index)                                              [place]

 

             Weight       2019 20:15:00 130 [lb_av]               [facility]



                                                                 [place]

 

             Heart Rate   2019 20:00:00 83 /min                   [facility]



                                                                 [place]

 

             BP Systolic  2019 20:00:00 141 mm[Hg]                [facility]



                                                                 [place]

 

             BP Diastolic 2019 20:00:00 94 mm[Hg]                 [facility]



                                                                 [place]







Procedures







                Procedure       Date / Time     Performing      Source



                                Performed       Clinician       

 

                Computed tomography of abdomen and 2020                   

   [facility]



                pelvis with contrast 00:00:00                        [place]

 

                Vascular access with ultrasound 2020                      

[facility]



                guidance        00:00:00                        [place]

 

                Venipuncture requiring physician 2020                     

 [facility]



                or skilled healthcare provider in 00:00:00                      

  [place]



                patient 3 years of age or older                                 

 

                Vascular access with ultrasound 2020                      

[facility]



                guidance        00:00:00                        [place]

 

                Venipuncture requiring physician 2020                     

 [facility]



                or skilled healthcare provider in 00:00:00                      

  [place]



                patient 3 years of age or older                                 

 

                Venipuncture requiring physician 2020                     

 [facility]



                or skilled healthcare provider in 00:00:00                      

  [place]



                patient 3 years of age or older                                 

 

                Vascular access with ultrasound 2020                      

[facility]



                guidance        00:00:00                        [place]

 

                Colonoscopy with biopsy 2020                      [facility]



                                00:00:00                        [place]

 

                Esophagogastroduodenoscopy with 2020                      

[facility]



                closed biopsy   00:00:00                        [place]

 

                Diagnostic      2020                      [facility]



                esophagogastroduodenoscopy (EGD) 00:00:00                       

 [place]



                with specimen collection                                 

 

                Colonoscopy, diagnostic 2020                      [facility]



                                00:00:00                        [place]

 

                Blood transfusion 2020                      [facility]



                                00:00:00                        [place]

 

                ECG (electrocardiogram) 2020                      [facility]



                                00:00:00                        [place]

 

                X-ray of chest, single view 2020                      [facility]



                                00:00:00                        [place]

 

                Vascular access with ultrasound 2020                      

[facility]



                guidance        00:00:00                        [place]

 

                Venipuncture requiring physician 2020                     

 [facility]



                or skilled healthcare provider in 00:00:00                      

  [place]



                patient 3 years of age or older                                 

 

                Computed tomography of abdomen and 2020-04-15                   

   [facility]



                pelvis with contrast 00:00:00                        [place]

 

                Physical therapy evaluation and 2020                      

[facility]



                treatment       00:00:00                        [place]

 

                X-ray of chest, single view 2020                      [facility]



                                00:00:00                        [place]

 

                Diagnostic      2020                      [facility]



                esophagogastroduodenoscopy (EGD) 00:00:00                       

 [place]



                with specimen collection                                 

 

                INSPECTION OF UPPER INTESTINAL 2020                      [facility]

[facility]



                TRACT, ENDO     00:00:00                        [place]

 

                ECG (electrocardiogram) 2020                      [facility]



                                00:00:00                        [place]

 

                X-ray of chest, single view 2020                      [facility]



                                00:00:00                        [place]

 

                Computed tomography of head or 2020                      [facility]

[facility]



                brain without contrast 00:00:00                        [place]

 

                Computed tomography of cervical 2020                      

[facility]



                spine without contrast 00:00:00                        [place]

 

                TRANSFUSE NONAUT FROZEN PLASMA IN 2020                    

  [facility]



                PERIPH VEIN, PERC 00:00:00                        [place]

 

                TRANSFUSE NONAUT RED BLOOD CELLS 2020                     

 [facility]



                IN PERIPH VEIN, PERC 00:00:00                        [place]

 

                TRANSFUSE NONAUT PLATELETS IN 2020                      [facility]



                PERIPH VEIN, PERC 00:00:00                        [place]

 

                INSERTION OF INFUSION DEV INTO SUP 2020                   

   [facility]



                VENA CAVA, PERC APPROACH 00:00:00                        Elizabe

th

 

                Mod sed same phys/qhp initial 15 2020                     

 [facility]



                mins 5/> yrs    00:00:00                        [place]

 

                Echo transesophag r-t 2d w/PRB img 2020                   

   [facility]



                acquisj i&r     00:00:00                        [place]

 

                Doppler echocard pulse wave 2020                      [facility]



                w/spectral display 00:00:00                        [place]

 

                Doppler color flow mapping 2020                      [facility]



                                00:00:00                        [place]

 

                Request For Service 2020                      [facility]

[facility]



                                00:00:00                        [place]

 

                No Charge [employer]   2020                      [facility]



                                00:00:00                        [place]

 

                Dup-scan xtr veins complete 2020                      [facility]



                bilateral study 00:00:00                        [place]

 

                Assessment of wound 2020                      [facility]

[facility]



                                00:00:00                        [place]

 

                Transthoracic two dimensional 2020-03-10                      

[facility]



                echocardiography with color 00:00:00                        Olimpia rojas



                Doppler imaging and contrast                                 

 

                Assessment of wound 2020                      [facility]

[facility]



                                00:00:00                        [place]

 

                Encounter Stat Only Clinic 2020                      [facility]



                                00:00:00                        [place]

 

                Incision and drainage, extremity 2020                     

 [facility]



                                00:00:00                        [place]

 

                ECG (electrocardiogram) 2020                      [facility]



                                00:00:00                        [place]

 

                Ultrasound of kidney and bladder 2020                     

 [facility]



                                00:00:00                        [place]

 

                X-ray of chest, single view 2020                      [facility]



                                00:00:00                        [place]

 

                ECG (electrocardiogram) 2020                      [facility]



                                00:00:00                        [place]

 

                Computed tomography of soft 2020                      [facility]



                tissues of neck with contrast [facility]:00:00                        

[facility]

 

                Computed tomography of abdomen and 2020                   

   [facility]



                pelvis without contrast 00:00:00                        Cathi

h

 

                ECG (electrocardiogram) 2020                      [facility]



                                00:00:00                        [place]

 

                X-ray of chest, single view 2020                      [facility]



                                00:00:00                        [place]

 

                Encounter Stat Only Clinic 2020                      [facility]



                                00:00:00                        [place]

 

                ECG (electrocardiogram) 2020                      [facility]



                                00:00:00                        [place]

 

                Assessment of wound 2019                      [facility]

[facility]



                                00:00:00                        [place]

 

                Encounter Stat Only Clinic 2019                      [facility]



                                00:00:00                        [place]







Plan of Care







             Planned Activity Planned Date Details      Comments     Source

 

             Future Scheduled Test              Venous blood hemoglobin         

     [facility]



                                       [facility] (mass/volume)              

[facility]



                                       [code = 16307-6]              

 

             Future Scheduled Test              Venous blood hemoglobin         

     [facility]



                                       measurement (mass/volume)              El

izabeth



                                       [code = 82408-3]              

 

             Future Scheduled Test              Venous blood hemoglobin         

     [facility]



                                       measurement (mass/volume)              El

izabeth



                                       [code = 01624-1]              

 

             Future Scheduled Test              Venous blood hemoglobin         

     [facility]



                                       measurement (mass/volume)              El

izabeth



                                       [code = 44862-8]              

 

             Future Scheduled Test              Venous blood hemoglobin         

     [facility]



                                       measurement (mass/volume)              El

izabeth



                                       [code = 77331-2]              

 

             Future Scheduled Test              Venous blood hemoglobin         

     [facility]



                                       measurement (mass/volume)              El

izabeth



                                       [code = 28769-2]              

 

             Future Scheduled Test              Venous blood hemoglobin         

     [facility]



                                       measurement (mass/volume)              El

izabeth



                                       [code = 06145-4]              

 

             Future Scheduled Test              Venous blood hemoglobin         

     [facility]



                                       measurement (mass/volume)              El

izabeth



                                       [code = 55330-2]              

 

             Goal                      Patient referral [code =              [facility]

[facility]



                                       [id number] ]                 [place]

 

             Goal                      Patient referral [code =              [facility]

[facility]



                                       [id number] ]                 [place]

 

             Goal                      Patient referral [code =              [facility]

[facility]



                                       [id number] ]                 [place]

 

             Goal                      Patient referral [code =              [facility]

[facility]



                                       [id number] ]                 [place]

 

             Goal                      Patient referral [code =              [facility]

[facility]



                                       [id number] ]                 [place]

 

             Goal                      Patient referral [code =              [facility]

[facility]



                                       [id number] ]                 [place]

 

             Instructions              Diabetes Type 2 (DC)              [facility]



                                                                 [place]

 

             Instructions              Diabetic Ketoacidosis              [facility]

[facility]



                                       (DC)                      [place]

 

             Instructions              Diabetes and Infections              [facility]



                                                                 [place]

 

             Instructions              Diabetic Ketoacidosis              [facility]

[facility]



                                                                 [place]

 

             Instructions              Acute Pain, Adult (DC)              [facility]



                                                                 [place]

 

             Instructions              Abscess Incision and              [facility]



                                       Drainage                  [place]

 

             Instructions              Diabetic Ketoacidosis              [facility]



                                       (DC)                      [place]

 

             Instructions              Gastrointestinal Bleeding              

[facility]



                                       (DC)                      [place]

 

             Instructions              Diabetes Type 2 (DC)              [facility]



                                                                 [place]

 

             Instructions              Clostridioides difficile              [facility]

[facility]



                                       (DC)                      [place]

 

             Instructions              Acute Abdomen (Belly              [facility]



                                       Pain), Adult (DC)              [place]

 

             Instructions              Gastroparesis (Delayed              [facility]



                                       Gastric Emptying) (DC)              [facility]

luis eduardo

 

             Instructions              Diabetes Exchange Diet              [facility]



                                                                 [place]

 

             Instructions              Diabetes and Infections              [facility]



                                                                 [place]

 

             Instructions              Pancreatitis              [facility]



                                                                 [place]

 

             Instructions              Dehydration, Adult (DC)              [facility]



                                                                 [place]

 

             Instructions              High Blood Pressure (DC)              [facility]



                                                                 [place]

 

             Instructions              Severe Abdominal Pain,              [facility]



                                       Adult (DC)                [place]

 

             Instructions              Nausea and Vomiting,              [facility]



                                       Adult (MT)                [place]

 

             Instructions              Degenerative Disc Disease              [facility]



                                       (MT)                      [place]

 

             Instructions              Alkaline Phosphatase Test              [facility]

 

             Instructions              Diabetic Meal Planning              [facility]







Encounters







        Start   End     Encounter Admission Attending Care    Care    Encounter 

Source



        Date/Time Date/Time Type    Type    Clinicians Facility Department ID   

   

 

        2020 Departed                 DANIS DING [id number]

2510 [facility]



        21:39:00 04:55:00 Emergency                 TEL   St.     81      [facility]

e



                                                                        

 

        2020 Discharged                 DANIS BERGUS AE00

[id number] [facility]



        01:08:00 18:47:00 Inpatient                 [facility]   St.     86      [facility]

e



                                                                        

 

        2020 Discharged                 DANIS BERGUS AE00

[id number] [facility]



        00:29:00 11:09:00 Inpatient                 [facility]   St     99      [facility]

e



                                                                        

 

        2020 Discharged                 [facility]RISTJ[facility] AE00

[id number] [facility]



        22:33:00 15:09:00 Inpatient                 [facility]   St.     40      [facility]

e



                                                                        

 

        2020 Discharged                 [facility] AE00

[id number] [facility]



        18:03:00 17:05:00 Inpatient                 TEL   St.     61      [facility]

e



                                                                        

 

        2020 Discharged                 [facility] AE00

[id number] [facility]



        22:11:00 17:42:00 Inpatient                 TELIZ   St.     05      S - 

St.



                                                        Laurie         Elizab

e



                                                                        

 

        2020 Discharged                 DANIS DING AE00

[id number] [facility]



        20:30:00 14:34:00 Inpatient                 TELLYNN   St.     52      S - 

St.



                                                        Laurie         Elizab

e



                                                                        

 

        2019 Discharged                 DANIS DING AE00

[id number] [facility]



        16:18:00 13:06:00 Inpatient                 TELIZ   St.     77      S - 

St.



                                                        Laurie         Elizab

e



                                                                        

 

        2018 Emergency E               [facility]  MED     [id number]

10 Medical



        11:14:00 11:14:00                                                 [facility]







Results







           Test Description Test Time  Test Comments Results    Result Comments 

Source









                    Capillary whole blood glucose measurement by glucometer 2020 02:58:00 



                    (mass/volume)                           









                      Test Item  Value      Reference Range Interpretation Comme

[facility]









             Bedside Glucose (test code = 59307-2) 289 mg/dL                    

          



[facility] [id number] 23:15:00





             Test Item    Value        Reference Range Interpretation Comments

 

             Urine Source (test code = 19159-3) URINE                           

       



[facility] Urine by [id number] 23:15:00





             Test Item    Value        Reference Range Interpretation Comments

 

             Urine Color (test code = 32702-1) Colorless                        

      



[facility] Elizabeth[facility]rine clarity pyioajllrpuqq2600-27-81 23:15:00





             Test Item    Value        Reference Range Interpretation Comments

 

             Urine Appearance (test code = 40145-6) Clear                       

           



[facility] by automated test [id number] 
23:15:00





             Test Item    Value        Reference Range Interpretation Comments

 

             Urine pH (test code = 14773-7) 6.5                                 

   



[facility] Elizabeth[facility] gravity of Urine by Automated test strip
2020 23:15:00





             Test Item    Value        Reference Range Interpretation Comments

 

             Urine Specific Gravity (test code = 1.027                          

        



             62848-9)                                            



[facility] protein measurement by automated test strip 
(mass/volume)2020 23:15:00





             Test Item    Value        Reference Range Interpretation Comments

 

             Urine Protein (test code = Negative mg/dL                          

 



             97110-2)                                            



[facility] glucose measurement by automated test strip 
(mass/volume)2020 23:15:00





             Test Item    Value        Reference Range Interpretation Comments

 

             Urine Glucose (UA) (test code = >1000 mg/dL                        

    



             54311-1)                                            



[facility] ketones measurement by automated test strip 
(mass/volume)2020 23:15:00





             Test Item    Value        Reference Range Interpretation Comments

 

             Urine Ketones (test code = 75162-4) 20 mg/dL                       

        



[facility] erythrocytes count by automated test strip 
(number/volume)2020 23:15:00





             Test Item    Value        Reference Range Interpretation Comments

 

             Urine Occult Blood (test code = 1+                                 

    



             98395-0)                                            



[facility] nitrite detection by automated test strip
2020 23:15:00





             Test Item    Value        Reference Range Interpretation Comments

 

             Urine Nitrite (test code = 55203-8) Negative                       

        



[facility] total bilirubin measurement by automated test 
strip (mass/volume)2020 23:15:00





             Test Item    Value        Reference Range Interpretation Comments

 

             Urine Bilirubin (test code = Negative mg/dL                        

   



             14838-0)                                            



[facility] urobilinogen measurement by automated test strip 
(mass/volume)2020 23:15:00





             Test Item    Value        Reference Range Interpretation Comments

 

             Urine Urobilinogen (test code Negative mg/dL                       

    



             = 14281-8)                                          



[facility] leukocytes count by automated test strip 
(number/volume)2020 23:15:00





             Test Item    Value        Reference Range Interpretation Comments

 

             Urine Leukocyte Esterase Negative {Vika}/uL                         

  



             (test code = 73440-1)                                        



[facility] [id number] 23:15:00





             Test Item    Value        Reference Range Interpretation Comments

 

             Microscopic Urinalysis (T) (test code = -----                      

            



             56251-0)                                            



[facility] sediment erythrocyte count by microscopy 
(number/high power field)2020 23:15:00





             Test Item    Value        Reference Range Interpretation Comments

 

             Urine RBC (test code = 74141-3) 3-10 /[HPF]                        

    



[facility] sediment leukocyte count by microscopy 
(number/high power field)2020 23:15:00





             Test Item    Value        Reference Range Interpretation Comments

 

             Urine WBC (test code = 5821-4) 0-5 /[HPF]                          

   



[facility] sediment epithelial cell count by microscopy 
(number/high power field)2020 23:15:00





             Test Item    Value        Reference Range Interpretation Comments

 

             Urine Epithelial Cells (test None Seen /[HPF]                      

     



             code = 5787-7)                                        



[facility] sediment crystal count by microscopy (number/high 
power field)2020 23:15:00





             Test Item    Value        Reference Range Interpretation Comments

 

             Urine Crystals (test code = None Seen /[HPF]                       

    



             99513-1)                                            



[facility] sediment bacteria count by microscopy (number/high
 power field)2020 23:15:00





             Test Item    Value        Reference Range Interpretation Comments

 

             Urine Bacteria (test code = None Seen /[HPF]                       

    



             5769-5)                                             



[facility] sediment casts count by microscopy (number/low 
power field)2020 23:15:00





             Test Item    Value        Reference Range Interpretation Comments

 

             Urine Casts (test code = None Seen /[LPF]                          

 



             9842-6)                                             



[facility] sediment hyaline cast count by microscopy 
(number/low power field)2020 23:15:00





             Test Item    Value        Reference Range Interpretation Comments

 

             Urine Hyaline Casts (test None Seen /[LPF]                         

  



             code = 5796-8)                                        



[facility] Elizabeth[facility]east detection in urine sediment by light microscopy
2020 23:15:00





             Test Item    Value        Reference Range Interpretation Comments

 

             Urine Yeast (test code = None Seen /[HPF]                          

 



             59051-6)                                            



[facility] comment  23:15:00





             Test Item    Value        Reference Range Interpretation Comments

 

             Urinalysis Comment (test code = 8262-8) *                          

            



[facility] comment  23:15:00





             Test Item    Value        Reference Range Interpretation Comments

 

             Urine Culture Indicated (test code = Not Ind                       

         



             8264-4)                                             



[facility] Elizabeth[facility] whole blood sodium measurement (moles/volume)
2020 21:56:00





             Test Item    Value        Reference Range Interpretation Comments

 

             Bedside Sodium (test code = 133 mmol/L                             



             69993-4)                                            



[facility] potassium measurement (moles/volume)
2020 21:56:00





             Test Item    Value        Reference Range Interpretation Comments

 

             Bedside Potassium (test code = 5.0 mmol/L                          

   



             98692-9)                                            



[facility] ElizabeVenous whole blood chloride measurement (moles/volume)
2020 21:56:00





             Test Item    Value        Reference Range Interpretation Comments

 

             Bedside Chloride (test code = 92 mmol/L                            

  



             52623-4)                                            



[facility] total carbon dioxide measurement 
(moles/volume)2020 21:56:00





             Test Item    Value        Reference Range Interpretation Comments

 

             Bedside Total CO2 (test code = 25.0 mmol/L                         

   



             -1)                                             



[facility] blood urea nitrogen (BUN) measurement 
(mass/volume)2020 21:56:00





             Test Item    Value        Reference Range Interpretation Comments

 

             Bedside Blood Urea Nitrogen (test 11 mg/dL                         

      



             code = 08755-4)                                        



[facility] measurement (mass/volume)2020 
21:56:00





             Test Item    Value        Reference Range Interpretation Comments

 

             Bedside Creatinine (test code = 0.7 mg/dL                          

    



             98741-4)                                            



[facility] glucose measurement (mass/volume)
2020 21:56:00





             Test Item    Value        Reference Range Interpretation Comments

 

             Bedside Glucose (test code = 490 mg/dL                             

 



             31563-8)                                            



[facility] ElibeWhole blood ionized calcium measurement (moles/volume)
2020 21:56:00





             Test Item    Value        Reference Range Interpretation Comments

 

             Bedside Whole Blood Ionized 1.19 mmol/L                            



             Calcium (test code = 1994-3)                                       

 



[facility] CenterbeBlood anion [id number] 21:56:00





             Test Item    Value        Reference Range Interpretation Comments

 

             Bedside Anion Gap (test code = 28727-6) 22                         

            



[facility] CenterbeGFR estimate [id number] 21:56:00





             Test Item    Value        Reference Range Interpretation Comments

 

             Estimat Glomerular Filtration Rate 127                             

       



             (test code = 62296-6)                                        



[facility] ElizabethVen[facility] blood hemoglobin measurement (mass/volume)
2020 21:56:00





             Test Item    Value        Reference Range Interpretation Comments

 

             Bedside Hemoglobin (test code = 12.2 g/dL                          

    



             12536-3)                                            



[facility] ElizabethVenous blood hematocrit (volume fraction)2020 
21:56:00





             Test Item    Value        Reference Range Interpretation Comments

 

             Bedside Hematocrit (test code = 36.0 %                             

    



             41383-3)                                            



[facility] ElizabethAutom[facility] blood leukocyte count (number/volume)
2020 21:55:00





             Test Item    Value        Reference Range Interpretation Comments

 

             White Blood Count (test code = 4.9 10*3/uL                         

   



             6690-2)                                             



[facility] ElizabethBlood erythrocytes automated count (number/volume)
2020 21:55:00





             Test Item    Value        Reference Range Interpretation Comments

 

             Red Blood Count (test code = 3.92 10*6/uL                          

 



             789-8)                                              



[facility]Blood hemoglobin measurement (mass/volume)2020 
21:55:00





             Test Item    Value        Reference Range Interpretation Comments

 

             Hemoglobin (test code = 718-7) 12.0 g/dL                           

   



[facility]. ElizabethAutomated blood hematocrit (volume fraction)2020 
21:55:00





             Test Item    Value        Reference Range Interpretation Comments

 

             Hematocrit (test code = 4544-3) 35.0 %                             

    



[facility] ElizabethAutomated erythrocyte mean corpuscular volume (MCV) 
[id number] 21:55:00





             Test Item    Value        Reference Range Interpretation Comments

 

             Mean Corpuscular Volume (test code = 89 fL                         

         



             787-2)                                              



[facility] ElizabethAutomated erythrocyte mean corpuscular hemoglobin (mass 
per erythrocyte)2020 21:55:00





             Test Item    Value        Reference Range Interpretation Comments

 

             Mean Corpuscular Hemoglobin (test 30.6 pg                          

      



             code = 785-6)                                        



[facility] ElizabethAutom[facility] erythrocyte mean corpuscular hemoglobin 
concentration measurement (mass/rfo0748-73-83 21:55:00





             Test Item    Value        Reference Range Interpretation Comments

 

             Mean Corpuscular Hemoglobin Concent 34.3 g/dL                      

        



             (test code = 786-4)                                        



[facility] ElizabethAutomated erythrocyte distribution width ppxoi0503-58-46
 21:55:00





             Test Item    Value        Reference Range Interpretation Comments

 

             Red Cell Distribution Width (test code 14.7 %                      

           



             = 788-0)                                            



[facility]. ElizabethAutomated blood platelet count (count/volume)2020 
21:55:00





             Test Item    Value        Reference Range Interpretation Comments

 

             Platelet Count (test code = 278 10*3/uL                            



             777-3)                                              



[facility] ElizabethAutomated blood platelet mean volume measurement
2020 21:55:00





             Test Item    Value        Reference Range Interpretation Comments

 

             Mean Platelet Volume (test code = 9.7                              

      



             72560-4)                                            



[facility] ElizabethAutomated blood neutrophil count as percentage of total 
jqarutnlmt5769-00-31 21:55:00





             Test Item    Value        Reference Range Interpretation Comments

 

             Neutrophils (%) (Auto) (test code = 75 %                           

        



             770-8)                                              



[facility] ElizabethAutomated blood immature granulocyte count as percentage
 of total rxewsbhybz8296-69-02 21:55:00





             Test Item    Value        Reference Range Interpretation Comments

 

             Immature Granulocyte % (Auto) (test 0 %                            

        



             code = 58017-0)                                        



[facility] ElizabethAutomated blood lymphocyte count as percentage of total 
[id number] 21:55:00





             Test Item    Value        Reference Range Interpretation Comments

 

             Lymphocytes (%) (Auto) (test code = 19 %                           

        



             736-9)                                              



[facility] ElizabethAutomated blood monocyte count as percentage of total 
lkoylzpslk0965-63-16 21:55:00





             Test Item    Value        Reference Range Interpretation Comments

 

             Monocytes (%) (Auto) (test code = 6 %                              

      



             5905-5)                                             



[facility] ElizabethAutomated blood eosinophil count as percentage of total 
srfleuodko8322-38-44 21:55:00





             Test Item    Value        Reference Range Interpretation Comments

 

             Eosinophils (%) (Auto) (test code = 0 %                            

        



             713-8)                                              



[facility] ElizabethAutomated blood basophil count as percentage of total 
koipjtaigf4129-42-93 21:55:00





             Test Item    Value        Reference Range Interpretation Comments

 

             Basophils (%) (Auto) (test code = 0 %                              

      



             706-2)                                              



[facility] ElibethAutomated blood nucleated erythrocyte count as 
percentage of total [id number] 21:55:00





             Test Item    Value        Reference Range Interpretation Comments

 

             Nucleated Red Blood Cells % (test code 0.0 %                       

           



             = 27819-0)                                          



[facility] ElibethAutomated blood neutrophil count (number/volume)
2020 21:55:00





             Test Item    Value        Reference Range Interpretation Comments

 

             Neutrophils # (Auto) (test code = 3.7 10*3/uL                      

      



             751-8)                                              



[facility] ElibethAutomated blood immature granulocyte count as percentage
 of total fninklnzfr7653-00-03 21:55:00





             Test Item    Value        Reference Range Interpretation Comments

 

             Immature Granulocyte # (Auto) 0.0 10*3/uL                          

  



             (test code = 42711-3)                                        



[facility] ElibethAutomated blood lymphocyte count (number/volume)
2020 21:55:00





             Test Item    Value        Reference Range Interpretation Comments

 

             Lymphocytes # (Auto) (test code = 0.9 10*3/uL                      

      



             731-0)                                              



[facility] monocytes automated count (number/volume)
2020 21:55:00





             Test Item    Value        Reference Range Interpretation Comments

 

             Monocytes # (Auto) (test code = 0.3 10*3/uL                        

    



             742-7)                                              



[facility] Hospital[facility] eosinophil [id number] 21:55:00





             Test Item    Value        Reference Range Interpretation Comments

 

             Eosinophils # (Auto) (test code = 0.0 10*3/uL                      

      



             711-2)                                              



[facility] Center[facility]Autom[facility] basophil count (number/volume)2020
 21:55:00





             Test Item    Value        Reference Range Interpretation Comments

 

             Basophils # (Auto) (test code = 0.0 10*3/uL                        

    



             704-7)                                              



[facility] ElizabethAutomated blood nucleated erythrocyte count 
(count/volume)2020 21:55:00





             Test Item    Value        Reference Range Interpretation Comments

 

             Nucleated Red Blood Cells # 0.00 10*3/uL                           



             (test code = 771-6)                                        



[facility]Service comment [id number] 21:55:00





             Test Item    Value        Reference Range Interpretation Comments

 

             Manual Differential (test code = Not Ind                           

     



             8265-1)                                             



[facility] ElizabethSerum or plasma sodium measurement (moles/volume)
2020 21:55:00





             Test Item    Value        Reference Range Interpretation Comments

 

             Sodium Level (test code = 2951-2) 133 mmol/L                       

      



[facility] Elizabeth[facility] or plasma potassium measurement (moles/volume)
2020 21:55:00





             Test Item    Value        Reference Range Interpretation Comments

 

             Potassium Level (test code = 5.0 mmol/L                            

 



             2823-3)                                             



[facility] ElizabethSerum or plasma chloride measurement (moles/volume)
2020 21:55:00





             Test Item    Value        Reference Range Interpretation Comments

 

             Chloride Level (test code = 2075-0) 96 mmol/L                      

        



[facility] total carbon dioxide measurement 
(moles/volume)2020 21:55:00





             Test Item    Value        Reference Range Interpretation Comments

 

             Carbon Dioxide Level (test code = 22 mmol/L                        

      



             -9)                                             



[facility] anion gap determination (moles/volume)
2020 21:55:00





             Test Item    Value        Reference Range Interpretation Comments

 

             Anion Gap (test code = 17970-4) 20                                 

    



[facility] urea nitrogen measurement (mass/volume)
2020 21:55:00





             Test Item    Value        Reference Range Interpretation Comments

 

             Blood Urea Nitrogen (test code = 11 mg/dL                          

     



             3094-0)                                             



[facility] creatinine measurement (mass/volume)
2020 21:55:00





             Test Item    Value        Reference Range Interpretation Comments

 

             Creatinine (test code = 2160-0) 1.0 mg/dL                          

    



[facility] [id number] 21:55:00





             Test Item    Value        Reference Range Interpretation Comments

 

             Estimat Glomerular Filtration Rate 84                              

       



             (test code = 45201-8)                                        



[facility] glucose measurement (mass/volume)
2020 21:55:00





             Test Item    Value        Reference Range Interpretation Comments

 

             Glucose Level (test code = 2345-7) 545 mg/dL                       

       



[facility] plasma calcium measurement (mass/volume)
2020 21:55:00





             Test Item    Value        Reference Range Interpretation Comments

 

             Calcium Level (test code = 30142-1) 8.2 mg/dL                      

        



[facility]. [facility]Ser[facility] or plasma total bilirubin measurement 
(mass/volume)2020 21:55:00





             Test Item    Value        Reference Range Interpretation Comments

 

             Total Bilirubin (test code = 0.5 mg/dL                             

 



             1975-2)                                             



[facility] aspartate aminotransferase measurement 
(enzymatic activity/volume)2020 21:55:00





             Test Item    Value        Reference Range Interpretation Comments

 

             Aspartate Amino Transf (AST/SGOT) 47 U/L                           

      



             (test code = 1920-8)                                        



[facility] or plasma alanine aminotransferase measurement 
(enzymatic activity/volume)2020 21:55:00





             Test Item    Value        Reference Range Interpretation Comments

 

             Alanine Aminotransferase (ALT/SGPT) 66 U/L                         

        



             (test code = 1742-6)                                        



[facility] or plasma protein measurement (mass/volume)
2020 21:55:00





             Test Item    Value        Reference Range Interpretation Comments

 

             Total Protein (test code = 2885-2) 5.7 g/dL                        

       



[facility] or plasma albumin measurement (mass/volume)
2020 21:55:00





             Test Item    Value        Reference Range Interpretation Comments

 

             Albumin (test code = 1751-7) 3.3 g/dL                              

 



[facility] measurement 
(enzymatic activity/volume)2020 21:55:00





             Test Item    Value        Reference Range Interpretation Comments

 

             Alkaline Phosphatase (test code = 548 U/L                          

      



             6768-6)                                             



[facility] by [facility]com[facility] 
(mass/volume)2020 17:06:00





             Test Item    Value        Reference Range Interpretation Comments

 

             Bedside Glucose (test code = 139 mg/dL                             

 



             62031-1)                                            



[facility] leukocyte count (number/volume)
2020 10:20:00





             Test Item    Value        Reference Range Interpretation Comments

 

             White Blood Count (test code = 4.4 10*3/uL                         

   



             6690-2)                                             



[facility] count (number/volume)
2020 10:20:00





             Test Item    Value        Reference Range Interpretation Comments

 

             Red Blood Count (test code = 2.72 10*6/uL                          

 



             789-8)                                              



[facility] ([facility]/volume)2020 
10:20:00





             Test Item    Value        Reference Range Interpretation Comments

 

             Hemoglobin (test code = 718-7) 8.7 g/dL                            

   



[facility] hematocrit (volume fraction)2020 
10:20:00





             Test Item    Value        Reference Range Interpretation Comments

 

             Hematocrit (test code = 4544-3) 27.4 %                             

    



[facility] erythrocyte mean corpuscular volume (MCV) 
[id number] 10:20:00





             Test Item    Value        Reference Range Interpretation Comments

 

             Mean Corpuscular Volume (test code = 101 fL                        

         



             787-2)                                              



[facility] eryth[facility] mean corpuscular hemoglobin (mass 
per erythrocyte)2020 10:20:00





             Test Item    Value        Reference Range Interpretation Comments

 

             Mean Corpuscular Hemoglobin (test 32.0 pg                          

      



             code = 785-6)                                        



[facility] mean corpuscular hemoglobin 
concentration measurement (mass/ngu1018-41-38 10:20:00





             Test Item    Value        Reference Range Interpretation Comments

 

             Mean Corpuscular Hemoglobin Concent 31.8 g/dL                      

        



             (test code = 786-4)                                        



[facility] erythrocyte distribution width [id number]
 10:20:00





             Test Item    Value        Reference Range Interpretation Comments

 

             Red Cell Distribution Width (test code 18.1 %                      

           



             = 788-0)                                            



[facility] platelet count (count/volume)2020 
10:20:00





             Test Item    Value        Reference Range Interpretation Comments

 

             Platelet Count (test code = 221 10*3/uL                            



             777-3)                                              



[facility] platelet mean volume measurement
2020 10:20:00





             Test Item    Value        Reference Range Interpretation Comments

 

             Mean Platelet Volume (test code = 9.5                              

      



             44168-3)                                            



[facility] neutrophil count as percentage of total 
[id number] 10:20:00





             Test Item    Value        Reference Range Interpretation Comments

 

             Neutrophils (%) (Auto) (test code = 60 %                           

        



             770-8)                                              



[facility] ElizabethAutomated blood immature granulocyte count as percentage
 of total [id number] 10:20:00





             Test Item    Value        Reference Range Interpretation Comments

 

             Immature Granulocyte % (Auto) (test 1 %                            

        



             code = 39723-4)                                        



[facility] ElizabethAutomated blood lymphocyte count as percentage of total 
[id number] 10:20:00





             Test Item    Value        Reference Range Interpretation Comments

 

             Lymphocytes (%) (Auto) (test code = 25 %                           

        



             736-9)                                              



[facility]. ElizabethAutomated blood monocyte count as percentage of total 
[id number] 10:20:00





             Test Item    Value        Reference Range Interpretation Comments

 

             Monocytes (%) (Auto) (test code = 13 %                             

      



             5905-5)                                             



[facility]. ElizabethAutomated blood eosinophil count as percentage of total 
[id number] 10:20:00





             Test Item    Value        Reference Range Interpretation Comments

 

             Eosinophils (%) (Auto) (test code = 1 %                            

        



             713-8)                                              



[facility] ElizabethAutomated blood basophil count as percentage of total 
kcvehwrrcp9682-90-22 10:20:00





             Test Item    Value        Reference Range Interpretation Comments

 

             Basophils (%) (Auto) (test code = 1 %                              

      



             706-2)                                              



[facility] ElizabethAutomated blood nucleated erythrocyte count as 
percentage of total [id number] 10:20:00





             Test Item    Value        Reference Range Interpretation Comments

 

             Nucleated Red Blood Cells % (test code 0.0 %                       

           



             = 35510-8)                                          



[facility] ElizabethAutomated blood neutrophil count (number/volume)
2020 10:20:00





             Test Item    Value        Reference Range Interpretation Comments

 

             Neutrophils # (Auto) (test code = 2.6 10*3/uL                      

      



             751-8)                                              



[facility]. ElizabethAutomated blood immature granulocyte count as percentage
 of total [id number] 10:20:00





             Test Item    Value        Reference Range Interpretation Comments

 

             Immature Granulocyte # (Auto) 0.0 10*3/uL                          

  



             (test code = 63622-7)                                        



[facility] ElizabethAutomated blood lymphocyte count (number/volume)
2020 10:20:00





             Test Item    Value        Reference Range Interpretation Comments

 

             Lymphocytes # (Auto) (test code = 1.1 10*3/uL                      

      



             731-0)                                              



[facility] automated count (number/volume)
2020 10:20:00





             Test Item    Value        Reference Range Interpretation Comments

 

             Monocytes # (Auto) (test code = 0.6 10*3/uL                        

    



             742-7)                                              



[facility] ElizabethAutom[facility] blood eosinophil [id number] 10:20:00





             Test Item    Value        Reference Range Interpretation Comments

 

             Eosinophils # (Auto) (test code = 0.0 10*3/uL                      

      



             711-2)                                              



[facility] ElizabethAutom[facility] blood basophil count (number/volume)2020
 10:20:00





             Test Item    Value        Reference Range Interpretation Comments

 

             Basophils # (Auto) (test code = 0.0 10*3/uL                        

    



             704-7)                                              



[facility] ElizabethAutom[facility] blood nucleated erythrocyte count 
(count/volume)2020 10:20:00





             Test Item    Value        Reference Range Interpretation Comments

 

             Nucleated Red Blood Cells # 0.00 10*3/uL                           



             (test code = 771-6)                                        



[facility]. ElizabethService comment  10:20:00





             Test Item    Value        Reference Range Interpretation Comments

 

             Manual Differential (test code = Not Ind                           

     



             8265-1)                                             



[facility]. ElizabethSerum or plasma sodium measurement (moles/volume)
2020 10:20:00





             Test Item    Value        Reference Range Interpretation Comments

 

             Sodium Level (test code = 2951-2) 136 mmol/L                       

      



[facility]. ElizabethSerum or plasma potassium measurement (moles/volume)
2020 10:20:00





             Test Item    Value        Reference Range Interpretation Comments

 

             Potassium Level (test code = 4.1 mmol/L                            

 



             2823-3)                                             



[facility] or plasma chloride measurement (moles/volume)
2020 10:20:00





             Test Item    Value        Reference Range Interpretation Comments

 

             Chloride Level (test code = 108 mmol/L                             



             2075-0)                                             



[facility]. ElizabethSerum or plasma total carbon dioxide measurement 
(moles/volume)2020 10:20:00





             Test Item    Value        Reference Range Interpretation Comments

 

             Carbon Dioxide Level (test code = 21 mmol/L                        

      



             2028)                                             



[facility] or plasma anion gap determination (moles/volume)
2020 10:20:00





             Test Item    Value        Reference Range Interpretation Comments

 

             Anion Gap (test code = 35508-3) 11                                 

    



[facility] plasma urea nitrogen measurement (mass/volume)
2020 10:20:00





             Test Item    Value        Reference Range Interpretation Comments

 

             Blood Urea Nitrogen (test code = 11 mg/dL                          

     



             3094-0)                                             



[facility] or plasma creatinine measurement (mass/volume)
2020 10:20:00





             Test Item    Value        Reference Range Interpretation Comments

 

             Creatinine (test code = 2160-0) 0.8 mg/dL                          

    



[facility] ElizabethGFR estimate [id number] 10:20:00





             Test Item    Value        Reference Range Interpretation Comments

 

             Estimat Glomerular Filtration Rate 109                             

       



             (test code = 54178-1)                                        



[facility] or plasma glucose measurement (mass/volume)
2020 10:20:00





             Test Item    Value        Reference Range Interpretation Comments

 

             Glucose Level (test code = 2345-7) 236 mg/dL                       

       



[facility] Elizabeth[facility] plasma calcium measurement (mass/volume)
2020 10:20:00





             Test Item    Value        Reference Range Interpretation Comments

 

             Calcium Level (test code = 55137-3) 7.5 mg/dL                      

        



[facility] neutrophils/100 leukocytes
2020 05:08:00





             Test Item    Value        Reference Range Interpretation Comments

 

             Neutrophils % (Manual) (test code = 83 %                           

        



             769-0)                                              



[facility]. ElizabethManual blood lymphocytes/100 [id number] 
05:08:00





             Test Item    Value        Reference Range Interpretation Comments

 

             Lymphocytes % (Manual) (test code = 16 %                           

        



             737-7)                                              



[facility]. ElizabethManual blood monocytes/100 [id number] 05:08:00





             Test Item    Value        Reference Range Interpretation Comments

 

             Monocytes % (Manual) (test code = 1 %                              

      



             744-3)                                              



[facility] detection by light [id number] 
05:08:00





             Test Item    Value        Reference Range Interpretation Comments

 

             Platelet Estimate (test code = Adequate                            

   



             9317-9)                                             



[facility] erythrocyte morphology finding identification
2020 05:08:00





             Test Item    Value        Reference Range Interpretation Comments

 

             Red Blood Cell Morphology (test code = Normal                      

           



             6742-1)                                             



[facility] Elizabeth[facility] blood eosinophil count as percentage of total 
[id number] 09:09:00





             Test Item    Value        Reference Range Interpretation Comments

 

             Eosinophils % (Manual) (test code = 1 %                            

        



             714-6)                                              



[facility] anisocytosis detection by light microscopy
2020 09:09:00





             Test Item    Value        Reference Range Interpretation Comments

 

             Anisocytosis (test code = 702-1) 1+                                

     



[facility] basophilic stippling detection by light microscopy
2020 09:09:00





             Test Item    Value        Reference Range Interpretation Comments

 

             Basophilic Stippling (test code = 1+                               

      



             703-9)                                              



[facility] ElizabethDetermination of inhaled oxygen concentration (volume 
fraction)2020 04:17:00





             Test Item    Value        Reference Range Interpretation Comments

 

             FiO2 (test code = 3150-0) 100 %                                  



[facility] pH [id number] 04:17:00





             Test Item    Value        Reference Range Interpretation Comments

 

             Arterial Blood pH (test code = 2744-1) 7.338                       

           



[facility] blood partial pressure of carbon dioxide
2020 04:17:00





             Test Item    Value        Reference Range Interpretation Comments

 

             Arterial Blood Partial Pressure 30.6 mm[Hg]                        

    



             CO2 (test code = 2019-8)                                        



[facility] Elizabeth[facility] blood partial pressure of oxygen measurement
2020 04:17:00





             Test Item    Value        Reference Range Interpretation Comments

 

             Arterial Blood Partial Pressure 102.2 mm[Hg]                       

    



             O2 (test code = 2703-7)                                        



[facility] blood bicarbonate measurement (moles/volume)
2020 04:17:00





             Test Item    Value        Reference Range Interpretation Comments

 

             Arterial Blood HCO3 (test code = 16.1 mmol/L                       

     



             1960)                                             



[facility] excess determination by calculation 
(moles/volume)2020 04:17:00





             Test Item    Value        Reference Range Interpretation Comments

 

             Arterial Blood Base Excess (test -8.9 mmol/L                       

     



             code = 1925-7)                                        



[facility] ElibeArterial blood hemoglobin measurement by oximetry 
(mass/volume)2020 04:17:00





             Test Item    Value        Reference Range Interpretation Comments

 

             Arterial Blood Hemoglobin (test code 5.8 g/dL                      

         



             = 70416-0)                                          



[facility] oxygen saturation [id number] 
04:17:00





             Test Item    Value        Reference Range Interpretation Comments

 

             Arterial Blood Oxygen Saturation (test 96.7 %                      

           



             code = 2708-6)                                        



[facility] carboxyhemoglobin/total hemoglobin ratio 
(mass fraction)2020 04:17:00





             Test Item    Value        Reference Range Interpretation Comments

 

             Arterial Blood Carboxyhemoglobin (test 1.6 %                       

           



             code = 2030-5)                                        



[facility] methemoglobin/total hemoglobin ratio 
(mass fraction)2020 04:17:00





             Test Item    Value        Reference Range Interpretation Comments

 

             Arterial Blood Methemoglobin (test code 0.3 %                      

            



             = 2615-3)                                           



[facility] oxyhemoglobin/total hemoglobin ratio 
(mass fraction)2020 04:17:00





             Test Item    Value        Reference Range Interpretation Comments

 

             Arterial Blood Oxyhemoglobin (test 94.9 %                          

       



             code = 2714-4)                                        



[facility] hemoglobin mass 
[id number] 04:17:00





             Test Item    Value        Reference Range Interpretation Comments

 

             Reduced Hemoglobin (test code = 3.2 %                              

    



             38003-3)                                            



[facility] oxygen content by [id number] 
04:17:00





             Test Item    Value        Reference Range Interpretation Comments

 

             Arterial Blood Oxygen Content (test 8.0 mL/dL                      

        



             code = 88676-7)                                        



[facility] deliv source [id number] 04:17:00





             Test Item    Value        Reference Range Interpretation Comments

 

             Oxygen Delivery Device (test code = ROOM AIR                       

        



             45700-8)                                            



[facility] [id number] 04:17:00





             Test Item    Value        Reference Range Interpretation Comments

 

             Blood Gas Puncture Site (test Left Radial                          

  



             code = 44630-4)                                        



[facility] artery patency prior to arterial 
[id number] 04:17:00





             Test Item    Value        Reference Range Interpretation Comments

 

             Franklin Test (test code = 19947-2) Pass                              

     



[facility] [id number] 01:13:00





             Test Item    Value        Reference Range Interpretation Comments

 

             Urine Source (test code = 19159-3) URINE                           

       



[facility] ElizabeColor of Urine by [id number] 01:13:00





             Test Item    Value        Reference Range Interpretation Comments

 

             Urine Color (test code = 43182-4) Colorless                        

      



[facility] [id number] 01:13:00





             Test Item    Value        Reference Range Interpretation Comments

 

             Urine Appearance (test code = 21370-3) Clear                       

           



[facility] by automated test [id number] 
01:13:00





             Test Item    Value        Reference Range Interpretation Comments

 

             Urine pH (test code = 83346-3) 5.5                                 

   



[facility] gravity of Urine by Automated test strip
2020 01:13:00





             Test Item    Value        Reference Range Interpretation Comments

 

             Urine Specific Gravity (test code = 1.023                          

        



             45702-0)                                            



[facility] protein measurement by automated test strip 
(mass/volume)2020 01:13:00





             Test Item    Value        Reference Range Interpretation Comments

 

             Urine Protein (test code = Negative mg/dL                          

 



             64212-0)                                            



[facility] glucose measurement by automated test strip 
(mass/volume)2020 01:13:00





             Test Item    Value        Reference Range Interpretation Comments

 

             Urine Glucose (UA) (test code = >1000 mg/dL                        

    



             71115-4)                                            



[facility] ketones measurement by automated test strip 
(mass/volume)2020 01:13:00





             Test Item    Value        Reference Range Interpretation Comments

 

             Urine Ketones (test code = 27389-6) 20 mg/dL                       

        



[facility] erythrocytes count by automated test strip 
(number/volume)2020 01:13:00





             Test Item    Value        Reference Range Interpretation Comments

 

             Urine Occult Blood (test code = 1+                                 

    



             88842-6)                                            



[facility] nitrite detection by automated test strip
2020 01:13:00





             Test Item    Value        Reference Range Interpretation Comments

 

             Urine Nitrite (test code = 27048-9) Negative                       

        



[facility] total bilirubin measurement by automated test 
strip (mass/volume)2020 01:13:00





             Test Item    Value        Reference Range Interpretation Comments

 

             Urine Bilirubin (test code = Negative mg/dL                        

   



             57636-5)                                            



[facility] Elizabeth[facility]rine urobilinogen measurement by automated test strip 
(mass/volume)2020 01:13:00





             Test Item    Value        Reference Range Interpretation Comments

 

             Urine Urobilinogen (test code Negative mg/dL                       

    



             = 95789-9)                                          



[facility] ElizabethU[facility] leukocytes count by automated test strip 
(number/volume)2020 01:13:00





             Test Item    Value        Reference Range Interpretation Comments

 

             Urine Leukocyte Esterase Negative {Vika}/uL                         

  



             (test code = 74267-0)                                        



[facility]. ElizabethMicroscopic examination of [id number] 01:13:00





             Test Item    Value        Reference Range Interpretation Comments

 

             Microscopic Urinalysis (T) (test code = -----                      

            



             87546-7)                                            



[facility] ElizabethUrine sediment erythrocyte count by microscopy 
(number/high power field)2020 01:13:00





             Test Item    Value        Reference Range Interpretation Comments

 

             Urine RBC (test code = 23274-5) 3-10 /[HPF]                        

    



[facility] ElizabethUrine sediment leukocyte count by microscopy 
(number/high power field)2020 01:13:00





             Test Item    Value        Reference Range Interpretation Comments

 

             Urine WBC (test code = 5821-4) 0-5 /[HPF]                          

   



[facility] sediment epithelial cell count by microscopy 
(number/high power field)2020 01:13:00





             Test Item    Value        Reference Range Interpretation Comments

 

             Urine Epithelial Cells (test None Seen /[HPF]                      

     



             code = 5787-7)                                        



[facility] ElizabethU[facility] sediment crystal count by microscopy (number/high 
power field)2020 01:13:00





             Test Item    Value        Reference Range Interpretation Comments

 

             Urine Crystals (test code = None Seen /[HPF]                       

    



             95174-7)                                            



[facility] sediment bacteria count by microscopy (number/high
 power field)2020 01:13:00





             Test Item    Value        Reference Range Interpretation Comments

 

             Urine Bacteria (test code = None Seen /[HPF]                       

    



             5769-5)                                             



[facility] sediment casts count by microscopy (number/low 
power field)2020 01:13:00





             Test Item    Value        Reference Range Interpretation Comments

 

             Urine Casts (test code = None Seen /[LPF]                          

 



             9842-6)                                             



[facility] sediment hyaline cast count by microscopy 
(number/low power field)2020 01:13:00





             Test Item    Value        Reference Range Interpretation Comments

 

             Urine Hyaline Casts (test None Seen /[LPF]                         

  



             code = 5796-8)                                        



[facility] ElizabethYeast detection in urine sediment by light microscopy
2020 01:13:00





             Test Item    Value        Reference Range Interpretation Comments

 

             Urine Yeast (test code = None Seen /[HPF]                          

 



             47064-2)                                            



[facility] comment  01:13:00





             Test Item    Value        Reference Range Interpretation Comments

 

             Urinalysis Comment (test code = 8262-8) *                          

            



[facility]  01:13:00





             Test Item    Value        Reference Range Interpretation Comments

 

             Urine Culture Indicated (test code = Not Ind                       

         



             8264-4)                                             



[facility] ElizabethOsmolality of [id number] 01:13:00





             Test Item    Value        Reference Range Interpretation Comments

 

             Urine Osmolality (test code = 464 mosm/kg                          

  



             2695-5)                                             



[facility] ElizabethManual blood band neutrophils form/100 leukocytes
2020 23:19:00





             Test Item    Value        Reference Range Interpretation Comments

 

             Band Neutrophils % (Manual) (test code 2 %                         

           



             = 764-1)                                            



[facility]Blood macrocytes detection by light dbltnuqqlc1239-71-70
 23:19:00





             Test Item    Value        Reference Range Interpretation Comments

 

             Macrocytosis (test code = 738-5) 1+                                

     



[facility] blood hypochromia detection by light microscopy
2020 23:19:00





             Test Item    Value        Reference Range Interpretation Comments

 

             Hypochromasia (test code = 728-6) 1+                               

      



[facility]Serum or plasma phosphate measurement (mass/volume)
2020 23:19:00





             Test Item    Value        Reference Range Interpretation Comments

 

             Phosphorus Level (test code = 6.9 mg/dL                            

  



             2777-1)                                             



[facility] or plasma magnesium measurement (mass/volume)
2020 23:19:00





             Test Item    Value        Reference Range Interpretation Comments

 

             Magnesium Level (test code = 1.97 mg/dL                            

 



             17390-1)                                            



[facility]. ElizabeVenous whole blood pH qepfyiyxubt6525-48-71 23:15:00





             Test Item    Value        Reference Range Interpretation Comments

 

             Venous Blood pH (test code = 2746-6) 7.149                         

         



[facility]. ElizabeUniversity [facility] Geneva [facility] Centerous blood partial pressure of carbon dioxide 
pqhmgchaghz0520-67-13 23:15:00





             Test Item    Value        Reference Range Interpretation Comments

 

             Blood Gas PCO2 (test code = 28.7 mm[Hg]                            



             -4)                                             



[facility]. ElizabeUniversity [facility]als Geneva [facility] Centerous blood partial pressure of oxygen measurement
2020 23:15:00





             Test Item    Value        Reference Range Interpretation Comments

 

             Blood Gas PO2 (test code = 36.1 mm[Hg]                            



             2705-2)                                             



[facility] ElibeUniversity [facility] Centerous blood bicarbonate measurement (moles/volume)
2020 23:15:00





             Test Item    Value        Reference Range Interpretation Comments

 

             Venous Blood HCO3 (test code = 9.8 mmol/L                          

   



             49857-9)                                            



[facility]. ElizabeUniversity [facility] Centerous whole blood base excess determination by 
calculation (moles/volume)2020 23:15:00





             Test Item    Value        Reference Range Interpretation Comments

 

             Venous Blood Base Excess (test -17.5 mmol/L                        

   



             code = 1927-3)                                        



[facility]. [place]beBlood hemoglobin measurement by oximetry (mass/volume)
2020 23:15:00





             Test Item    Value        Reference Range Interpretation Comments

 

             Venous Blood Hemoglobin (test code = 5.0 g/dL                      

         



             45904-1)                                            



[facility] Centerous [facility] oxygen saturation (mass fraction)2020
 23:15:00





             Test Item    Value        Reference Range Interpretation Comments

 

             Venous Blood Oxygen Saturation (test 45.1 %                        

         



             code = 2711-0)                                        



[facility] carboxy[facility]oglobin/total hemoglobin ratio
2020 23:15:00





             Test Item    Value        Reference Range Interpretation Comments

 

             Venous Blood Carboxyhemoglobin (test 0.3 %                         

         



             code = 2032-1)                                        



[facility] whole blood methemoglobin/total hemoglobin (mass 
fraction)2020 23:15:00





             Test Item    Value        Reference Range Interpretation Comments

 

             Venous Blood Methemoglobin (test code = 0.3 %                      

            



             2617-9)                                             



[facility] carboxyhemoglobin/total hemoglobin ratio
2020 23:15:00





             Test Item    Value        Reference Range Interpretation Comments

 

             Venous Blood Oxyhemoglobin (test code 44.8 %                       

          



             = 2032-1)                                           



[facility] blood deoxyhemoglobin/total hemoglobin mass ratio
2020 23:15:00





             Test Item    Value        Reference Range Interpretation Comments

 

             Reduced Hemoglobin (test code = 54.6 %                             

    



             15461-9)                                            



[facility] blood oxygen content by [id number] 
23:15:00





             Test Item    Value        Reference Range Interpretation Comments

 

             Venous Blood Oxygen Content (test 3.2 mL/dL                        

      



             code = 00541-1)                                        



[facility] deliv source [id number] 23:15:00





             Test Item    Value        Reference Range Interpretation Comments

 

             Oxygen Delivery Device (test code = ROOM AIR                       

        



             87173-6)                                            



[facility] [id number] 23:15:00





             Test Item    Value        Reference Range Interpretation Comments

 

             Blood Gas Puncture Site (test code = Venous                        

         



             55776-0)                                            



[facility] patency prior to arterial 
[id number] 23:15:00





             Test Item    Value        Reference Range Interpretation Comments

 

             Franklin Test (test code = Not Applicable                           



             00989-7)                                            



[facility] [id number] 23:15:00





             Test Item    Value        Reference Range Interpretation Comments

 

             Blood Gas Critical Value Called LISA BRYANT MD                       

    



             To (test code = 8264-4)                                        



[facility]. [facility]beAb[facility] 04 [id number] 23:15:00





             Test Item    Value        Reference Range Interpretation Comments

 

             Blood Gas Notified Time (test [id number]                       

    



             code = 8265-1)                                        



[facility]Whole blood cardiac troponin I measurement (mass/volume)
2020 23:14:00





             Test Item    Value        Reference Range Interpretation Comments

 

             Bedside Troponin I (test code = 0.01 ng/mL                         

    



             18124-1)                                            



[facility]. ElizabethVenous whole blood sodium measurement (moles/volume)
2020 23:11:00





             Test Item    Value        Reference Range Interpretation Comments

 

             Bedside Sodium (test code = 125 mmol/L                             



             41859-9)                                            



[facility] ElizabethVen[facility] whole blood potassium measurement (moles/volume)
2020 23:11:00





             Test Item    Value        Reference Range Interpretation Comments

 

             Bedside Potassium (test code = 5.7 mmol/L                          

   



             65980-5)                                            



[facility]. ElizabeVenous whole blood chloride measurement (moles/volume)
2020 23:11:00





             Test Item    Value        Reference Range Interpretation Comments

 

             Bedside Chloride (test code = 93 mmol/L                            

  



             46394-9)                                            



[facility] ElizabethVen[facility] whole blood total carbon dioxide measurement 
(moles/volume)2020 23:11:00





             Test Item    Value        Reference Range Interpretation Comments

 

             Bedside Total CO2 (test code = 13.0 mmol/L                         

   



             7-1)                                             



[facility]. ElizabethVenous whole blood urea nitrogen (BUN) measurement 
(mass/volume)2020 23:11:00





             Test Item    Value        Reference Range Interpretation Comments

 

             Bedside Blood Urea Nitrogen (test 39 mg/dL                         

      



             code = 68380-0)                                        



[facility]. ElizabethBlood creatinine measurement (mass/volume)2020 
23:11:00





             Test Item    Value        Reference Range Interpretation Comments

 

             Bedside Creatinine (test code = 0.9 mg/dL                          

    



             51866-1)                                            



[facility] ElizabethVenous whole blood glucose measurement (mass/volume)
2020 23:11:00





             Test Item    Value        Reference Range Interpretation Comments

 

             Bedside Glucose (test code = > 600 mg/dL                           

 



             43844-7)                                            



[facility]. ElizabeWhole blood ionized calcium measurement (moles/volume)
2020 23:11:00





             Test Item    Value        Reference Range Interpretation Comments

 

             Bedside Whole Blood Ionized 1.12 mmol/L                            



             Calcium (test code = 1994-3)                                       

 



[facility]. ElizabeBlood anion [id number] 23:11:00





             Test Item    Value        Reference Range Interpretation Comments

 

             Bedside Anion Gap (test code = 59468-0) 26                         

            



[facility]. ElibethGFR estimate [id number] 23:11:00





             Test Item    Value        Reference Range Interpretation Comments

 

             Estimat Glomerular Filtration Rate 95                              

       



             (test code = 22620-0)                                        



[facility] ElibeVenous blood hematocrit (volume fraction)2020 
23:11:00





             Test Item    Value        Reference Range Interpretation Comments

 

             Bedside Hematocrit (test code = < 15.0 %                           

    



             63147-1)                                            



[facility] ElibeCapillary whole blood glucose measurement by glucometer 
(mass/volume)2020 05:33:00





             Test Item    Value        Reference Range Interpretation Comments

 

             Bedside Glucose (test code = 152 mg/dL                             

 



             90077-4)                                            



[facility] EliPo[facility] HospitalAutomated blood leukocyte count (number/volume)
2020 03:55:00





             Test Item    Value        Reference Range Interpretation Comments

 

             White Blood Count (test code = 4.0 10*3/uL                         

   



             6690-2)                                             



[facility] (number/volume)
2020 03:55:00





             Test Item    Value        Reference Range Interpretation Comments

 

             Red Blood Count (test code = 3.07 10*6/uL                          

 



             789-8)                                              



[facility]. ElizabeBlood hemoglobin measurement (mass/volume)2020 
03:55:00





             Test Item    Value        Reference Range Interpretation Comments

 

             Hemoglobin (test code = 718-7) 9.5 g/dL                            

   



[facility] CenterbeAutom[facility] blood hematocrit (volume fraction)2020 
03:55:00





             Test Item    Value        Reference Range Interpretation Comments

 

             Hematocrit (test code = 4544-3) 29.2 %                             

    



[facility] ElizabeAutomated erythrocyte mean corpuscular volume (MCV) 
dcbmnfbjhpt5312-64-28 03:55:00





             Test Item    Value        Reference Range Interpretation Comments

 

             Mean Corpuscular Volume (test code = 95 fL                         

         



             787-2)                                              



[facility] ElizabethAutomated erythrocyte mean corpuscular hemoglobin (mass 
per erythrocyte)2020 03:55:00





             Test Item    Value        Reference Range Interpretation Comments

 

             Mean Corpuscular Hemoglobin (test 30.9 pg                          

      



             code = 785-6)                                        



[facility]Autom[facility] erythrocyte mean corpuscular hemoglobin 
concentration measurement (mass/viv1571-45-09 03:55:00





             Test Item    Value        Reference Range Interpretation Comments

 

             Mean Corpuscular Hemoglobin Concent 32.5 g/dL                      

        



             (test code = 786-4)                                        



[facility]. ElizabethAutomated erythrocyte distribution width [id number]
 03:55:00





             Test Item    Value        Reference Range Interpretation Comments

 

             Red Cell Distribution Width (test code 14.1 %                      

           



             = 788-0)                                            



[facility] ElizabethAutomated blood platelet count (count/volume)2020 
03:55:00





             Test Item    Value        Reference Range Interpretation Comments

 

             Platelet Count (test code = 346 10*3/uL                            



             777-3)                                              



[facility] ElizabethAutomated blood platelet mean volume measurement
2020 03:55:00





             Test Item    Value        Reference Range Interpretation Comments

 

             Mean Platelet Volume (test code = 9.1                              

      



             21603-0)                                            



[facility] ElizabethAutomated blood neutrophil count as percentage of total 
txfvndiqyn6450-30-46 03:55:00





             Test Item    Value        Reference Range Interpretation Comments

 

             Neutrophils (%) (Auto) (test code = 38 %                           

        



             770-8)                                              



[facility] ElizabethAutomated blood immature granulocyte count as percentage
 of total [id number] 03:55:00





             Test Item    Value        Reference Range Interpretation Comments

 

             Immature Granulocyte % (Auto) (test 3 %                            

        



             code = 98125-7)                                        



[facility] ElizabethAutomated blood lymphocyte count as percentage of total 
mzndcqvcmm6519-93-28 03:55:00





             Test Item    Value        Reference Range Interpretation Comments

 

             Lymphocytes (%) (Auto) (test code = 34 %                           

        



             736-9)                                              



[facility] ElizabethAutomated blood monocyte count as percentage of total 
cinoebbhup2313-43-40 03:55:00





             Test Item    Value        Reference Range Interpretation Comments

 

             Monocytes (%) (Auto) (test code = 23 %                             

      



             5905-5)                                             



[facility] ElizabethAutomated blood eosinophil count as percentage of total 
[id number] 03:55:00





             Test Item    Value        Reference Range Interpretation Comments

 

             Eosinophils (%) (Auto) (test code = 2 %                            

        



             713-8)                                              



[facility] ElizabethAutomated blood basophil count as percentage of total 
sxdilnakuq5378-16-23 03:55:00





             Test Item    Value        Reference Range Interpretation Comments

 

             Basophils (%) (Auto) (test code = 1 %                              

      



             706-2)                                              



[facility] ElizabethAutomated blood nucleated erythrocyte count as 
percentage of total eiuperssao4747-70-59 03:55:00





             Test Item    Value        Reference Range Interpretation Comments

 

             Nucleated Red Blood Cells % (test code 0.0 %                       

           



             = 76751-8)                                          



[facility] ElizabethAutomated blood neutrophil count (number/volume)
2020 03:55:00





             Test Item    Value        Reference Range Interpretation Comments

 

             Neutrophils # (Auto) (test code = 1.5 10*3/uL                      

      



             751-8)                                              



[facility] ElizabethAutomated blood immature granulocyte count as percentage
 of total xquadogboy9434-22-22 03:55:00





             Test Item    Value        Reference Range Interpretation Comments

 

             Immature Granulocyte # (Auto) 0.1 10*3/uL                          

  



             (test code = 19041-2)                                        



[facility] ElizabethAutomated blood lymphocyte count (number/volume)
2020 03:55:00





             Test Item    Value        Reference Range Interpretation Comments

 

             Lymphocytes # (Auto) (test code = 1.3 10*3/uL                      

      



             731-0)                                              



[facility] ElizabethBlood monocytes automated count (number/volume)
2020 03:55:00





             Test Item    Value        Reference Range Interpretation Comments

 

             Monocytes # (Auto) (test code = 0.9 10*3/uL                        

    



             742-7)                                              



[facility] ElizabethAutomated blood eosinophil [id number] 03:55:00





             Test Item    Value        Reference Range Interpretation Comments

 

             Eosinophils # (Auto) (test code = 0.1 10*3/uL                      

      



             711-2)                                              



[facility] ElizabethAutomated blood basophil count (number/volume)2020
 03:55:00





             Test Item    Value        Reference Range Interpretation Comments

 

             Basophils # (Auto) (test code = 0.0 10*3/uL                        

    



             704-7)                                              



[facility] ElizabethAutomated blood nucleated erythrocyte count 
(count/volume)2020 03:55:00





             Test Item    Value        Reference Range Interpretation Comments

 

             Nucleated Red Blood Cells # 0.00 10*3/uL                           



             (test code = 771-6)                                        



[facility] Elizabeth[facility] comment [id number] 03:55:00





             Test Item    Value        Reference Range Interpretation Comments

 

             Manual Differential (test code = Not Ind                           

     



             8265-1)                                             



[facility] ElizabethSerum or plasma sodium measurement (moles/volume)
2020 03:55:00





             Test Item    Value        Reference Range Interpretation Comments

 

             Sodium Level (test code = 2951-2) 133 mmol/L                       

      



[facility] ElizabethSerum or plasma potassium measurement (moles/volume)
2020 03:55:00





             Test Item    Value        Reference Range Interpretation Comments

 

             Potassium Level (test code = 4.8 mmol/L                            

 



             2823-3)                                             



[facility] ElizabethSerum or plasma chloride measurement (moles/volume)
2020 03:55:00





             Test Item    Value        Reference Range Interpretation Comments

 

             Chloride Level (test code = 105 mmol/L                             



             5-0)                                             



[facility] ElizabethSerum or plasma total carbon dioxide measurement 
(moles/volume)2020 03:55:00





             Test Item    Value        Reference Range Interpretation Comments

 

             Carbon Dioxide Level (test code = 21 mmol/L                        

      



             -9)                                             



[facility] ElizabethSerum or plasma anion gap determination (moles/volume)
2020 03:55:00





             Test Item    Value        Reference Range Interpretation Comments

 

             Anion Gap (test code = 46713-1) 12                                 

    



[facility] ElizabethSerum or plasma urea nitrogen measurement (mass/volume)
2020 03:55:00





             Test Item    Value        Reference Range Interpretation Comments

 

             Blood Urea Nitrogen (test code = 5 mg/dL                           

     



             3094-0)                                             



[facility] ElizabethSerum or plasma creatinine measurement (mass/volume)
2020 03:55:00





             Test Item    Value        Reference Range Interpretation Comments

 

             Creatinine (test code = 2160-0) 0.6 mg/dL                          

    



[facility] CenterzabethGFR estimate [id number] 03:55:00





             Test Item    Value        Reference Range Interpretation Comments

 

             Estimat Glomerular Filtration Rate 152                             

       



             (test code = 61498-7)                                        



[facility] ElizabethSerum or plasma glucose measurement (mass/volume)
2020 03:55:00





             Test Item    Value        Reference Range Interpretation Comments

 

             Glucose Level (test code = 2345-7) 189 mg/dL                       

       



[facility] ElizabethSerum or plasma calcium measurement (mass/volume)
2020 03:55:00





             Test Item    Value        Reference Range Interpretation Comments

 

             Calcium Level (test code = 89126-3) 7.8 mg/dL                      

        



[facility] ElizabethSerum or plasma phosphate measurement (mass/volume)
2020 03:55:00





             Test Item    Value        Reference Range Interpretation Comments

 

             Phosphorus Level (test code = 3.4 mg/dL                            

  



             2777-1)                                             



[facility] ElizabethSerum or plasma magnesium measurement (mass/volume)
2020 03:55:00





             Test Item    Value        Reference Range Interpretation Comments

 

             Magnesium Level (test code = 1.24 mg/dL                            

 



             16006-2)                                            



[facility] ElizabethSerum or plasma total bilirubin measurement 
(mass/volume)2020 03:55:00





             Test Item    Value        Reference Range Interpretation Comments

 

             Total Bilirubin (test code = 0.2 mg/dL                             

 



             1975-2)                                             



[facility] ElizabethSerum or plasma aspartate aminotransferase measurement 
(enzymatic activity/volume)2020 03:55:00





             Test Item    Value        Reference Range Interpretation Comments

 

             Aspartate Amino Transf (AST/SGOT) 24 U/L                           

      



             (test code = 1920-8)                                        



[facility] ElizabethSerum or plasma alanine aminotransferase measurement 
(enzymatic activity/volume)2020 03:55:00





             Test Item    Value        Reference Range Interpretation Comments

 

             Alanine Aminotransferase (ALT/SGPT) 18 U/L                         

        



             (test code = 1742-6)                                        



[facility] ElizabethSerum or plasma protein measurement (mass/volume)
2020 03:55:00





             Test Item    Value        Reference Range Interpretation Comments

 

             Total Protein (test code = 2885-2) 5.1 g/dL                        

       



[facility] ElizabethSerum or plasma albumin measurement (mass/volume)
2020 03:55:00





             Test Item    Value        Reference Range Interpretation Comments

 

             Albumin (test code = 1751-7) 2.4 g/dL                              

 



[facility]. ElizabethSerum or plasma alkaline phosphatase measurement 
(enzymatic activity/volume)2020 03:55:00





             Test Item    Value        Reference Range Interpretation Comments

 

             Alkaline Phosphatase (test code = 271 U/L                          

      



             6768-6)                                             



[facility]. [facility]abeLand[facility] Centererum or plasma total bilirubin measurement 
(mass/volume)2020 03:55:00





             Test Item    Value        Reference Range Interpretation Comments

 

             Total Bilirubin (test code = 0.2 mg/dL                             

 



             -)                                             



[facility]. [facility]abeLand[facility] Medical Centererum or plasma aspartate aminotransferase measurement 
(enzymatic activity/volume)2020 03:55:00





             Test Item    Value        Reference Range Interpretation Comments

 

             Aspartate Amino Transf (AST/SGOT) 24 U/L                           

      



             (test code = 1920-8)                                        



[facility]abeLand[facility] Centererum or plasma alanine aminotransferase measurement 
(enzymatic activity/volume)2020 03:55:00





             Test Item    Value        Reference Range Interpretation Comments

 

             Alanine Aminotransferase (ALT/SGPT) 18 U/L                         

        



             (test code = 1742-6)                                        



[facility]. ElizabethSerum or plasma protein measurement (mass/volume)
2020 03:55:00





             Test Item    Value        Reference Range Interpretation Comments

 

             Total Protein (test code = 2885-2) 5.1 g/dL                        

       



[facility]ibeLand[facility] Medical Centererum or plasma albumin measurement (mass/volume)
2020 03:55:00





             Test Item    Value        Reference Range Interpretation Comments

 

             Albumin (test code = 1751-7) 2.4 g/dL                              

 



[facility]. ElizabethSerum or plasma alkaline phosphatase measurement 
(enzymatic activity/volume)2020 03:55:00





             Test Item    Value        Reference Range Interpretation Comments

 

             Alkaline Phosphatase (test code = 271 U/L                          

      



             6768-6)                                             



[facility]. [facility]beLand[facility] Centerpecimen source [id number] 13:49:00





             Test Item    Value        Reference Range Interpretation Comments

 

             Respiratory Virus Source Nasopharyngeal Swab                       

    



             (test code = 23521-6)                                        



[facility]-CoV[facility] RNA Resp Ql JOLIE+[id number] 13:49:00





             Test Item    Value        Reference Range Interpretation Comments

 

             Coronavirus (COVID-19)(PCR) (test NEGATIVE                         

      



             code = 04882-4)                                        



[facility] [id number] 13:49:00





             Test Item    Value        Reference Range Interpretation Comments

 

             Respiratory Virus Source Nasopharyngeal Swab                       

    



             (test code = 85255-2)                                        



[facility] SicxsvsduJDFU-ZuJ-4 RNA Resp Ql JOLIE+ypylc6040-76-19 13:49:00





             Test Item    Value        Reference Range Interpretation Comments

 

             Coronavirus (COVID-19)(PCR) (test NEGATIVE                         

      



             code = 25644-8)                                        



[facility]. ElizabethSerum or plasma beta hydroxybutyrate measurement 
(moles/volume)2020 15:38:00





             Test Item    Value        Reference Range Interpretation Comments

 

             Beta-Hydroxybutyric Acid (test 0.04 mmol/L                         

   



             code = 6873-4)                                        



[facility] ElizabethSerum or plasma beta hydroxybutyrate measurement 
(moles/volume)2020 15:38:00





             Test Item    Value        Reference Range Interpretation Comments

 

             Beta-Hydroxybutyric Acid (test 0.04 mmol/L                         

   



             code = 6873-4)                                        



[facility] Hospital[facility]ination [facility] oxygen concentration (volume 
fraction)2020 03:36:00





             Test Item    Value        Reference Range Interpretation Comments

 

             FiO2 (test code = 3150-0) 21 %                                   



[facility] HospitalArterial blood pH [id number] 03:36:00





             Test Item    Value        Reference Range Interpretation Comments

 

             Arterial Blood pH (test code = 2744-1) 7.365                       

           



[facility] Center[facility]Ar[facility] blood partial pressure of carbon dioxide
2020 03:36:00





             Test Item    Value        Reference Range Interpretation Comments

 

             Arterial Blood Partial Pressure 28.3 mm[Hg]                        

    



             CO2 (test code = 2019-8)                                        



[facility] HospitalAr[facility] blood partial pressure of oxygen measurement
2020 03:36:00





             Test Item    Value        Reference Range Interpretation Comments

 

             Arterial Blood Partial Pressure 93.7 mm[Hg]                        

    



             O2 (test code = 2703-7)                                        



[facility] HospitalAr[facility] blood bicarbonate measurement (moles/volume)
2020 03:36:00





             Test Item    Value        Reference Range Interpretation Comments

 

             Arterial Blood HCO3 (test code = 15.8 mmol/L                       

     



             -4)                                             



[facility] HospitalAr[facility]ial blood base excess determination by calculation 
(moles/volume)2020 03:36:00





             Test Item    Value        Reference Range Interpretation Comments

 

             Arterial Blood Base Excess (test -8.4 mmol/L                       

     



             code = 1925-7)                                        



[facility] HospitalAr[facility] blood hemoglobin measurement by oximetry 
(mass/volume)2020 03:36:00





             Test Item    Value        Reference Range Interpretation Comments

 

             Arterial Blood Hemoglobin (test 10.4 g/dL                          

    



             code = 35226-1)                                        



[facility] [id number] 
03:36:00





             Test Item    Value        Reference Range Interpretation Comments

 

             Arterial Blood Oxygen Saturation (test 96.7 %                      

           



             code = 2708-6)                                        



[facility] Center[facility] carboxyhemoglobin/total hemoglobin ratio 
(mass fraction)2020 03:36:00





             Test Item    Value        Reference Range Interpretation Comments

 

             Arterial Blood Carboxyhemoglobin (test 0.3 %                       

           



             code = 2030-5)                                        



[facility] methemoglobin/total hemoglobin ratio 
(mass fraction)2020 03:36:00





             Test Item    Value        Reference Range Interpretation Comments

 

             Arterial Blood Methemoglobin (test code 0.3 %                      

            



             = 2615-3)                                           



[facility] HospitalAr[facility] blood oxyhemoglobin/total hemoglobin ratio 
(mass fraction)2020 03:36:00





             Test Item    Value        Reference Range Interpretation Comments

 

             Arterial Blood Oxyhemoglobin (test 96.1 %                          

       



             code = 2714-4)                                        



[facility] Center[facility] deoxyhemoglobin/total hemoglobin mass 
[id number] 03:36:00





             Test Item    Value        Reference Range Interpretation Comments

 

             Reduced Hemoglobin (test code = 3.3 %                              

    



             61510-2)                                            



[facility] Center[facility] content by svmzbwswvna9011-22-42 
03:36:00





             Test Item    Value        Reference Range Interpretation Comments

 

             Arterial Blood Oxygen Content 14.2 mL/dL                           

  



             (test code = 25699-3)                                        



[facility] HospitalGas deliv source Eoqdvgnnvgh0359-78-17 03:36:00





             Test Item    Value        Reference Range Interpretation Comments

 

             Oxygen Delivery Device (test code = ROOM AIR                       

        



             65893-2)                                            



[facility] [id number] 03:36:00





             Test Item    Value        Reference Range Interpretation Comments

 

             Blood Gas Puncture Site (test Right Brachial                       

    



             code = 12201-3)                                        



[facility] HospitalAssessment of wrist artery patency prior to arterial 
khsevidg1874-84-16 03:36:00





             Test Item    Value        Reference Range Interpretation Comments

 

             [place] Test (test code = Not Applicable                           



             72734-7)                                            



[facility] HospitalArterial blood oxygen saturation measurement by pulse 
sskeaomv7543-84-92 03:36:00





             Test Item    Value        Reference Range Interpretation Comments

 

             Oxygen Saturation (Pulse Oximetry) 100 %                           

       



             (test code = 27689-4)                                        



[facility] CenterthResp [id number] 03:36:00





             Test Item    Value        Reference Range Interpretation Comments

 

             Blood Gas Respiration Rate (test code 12 /min                      

          



             = 9279-1)                                           



[facility] 06 [id number] 03:36:00





             Test Item    Value        Reference Range Interpretation Comments

 

             Blood Gas Comments (test code = 8267-7) HR79                       

            



[facility] HospitalArterial blood oxygen saturation measurement by pulse 
igbwzbun6281-27-90 03:36:00





             Test Item    Value        Reference Range Interpretation Comments

 

             Oxygen Saturation (Pulse Oximetry) 100 %                           

       



             (test code = 82911-4)                                        



[facility] CenterthResp [id number] 03:36:00





             Test Item    Value        Reference Range Interpretation Comments

 

             Blood Gas Respiration Rate (test code 12 /min                      

          



             = 9279-1)                                           



[facility] 06 [id number]-[id number] 03:36:00





             Test Item    Value        Reference Range Interpretation Comments

 

             Blood Gas Comments (test code = 8267-7) HR79                       

            



[facility] HospitalUrinalysis specimen collection method2020-04-15 23:15:00





             Test Item    Value        Reference Range Interpretation Comments

 

             Urine Source (test code = 19159-3) URINE                           

       



[facility] HospitalColor of Urine by Auto2020-04-15 23:15:00





             Test Item    Value        Reference Range Interpretation Comments

 

             Urine Color (test code = 12996-0) Colorless                        

      



[facility] determination2020-04-15 23:15:00





             Test Item    Value        Reference Range Interpretation Comments

 

             Urine Appearance (test code = 01062-2) Clear                       

           



[facility] by automated test strip2020-04-15 
23:15:00





             Test Item    Value        Reference Range Interpretation Comments

 

             Urine pH (test code = 67689-8) 5.5                                 

   



[facility] gravity of Urine by Automated test strip
2020-04-15 23:15:00





             Test Item    Value        Reference Range Interpretation Comments

 

             Urine Specific Gravity (test code = 1.023                          

        



             11702-0)                                            



[facility] Elizabeth[facility] protein measurement by automated test strip 
(mass/volume)2020-04-15 23:15:00





             Test Item    Value        Reference Range Interpretation Comments

 

             Urine Protein (test code = 34036-3) 10 mg/dL                       

        



[facility] ElizabeUrine glucose measurement by automated test strip 
(mass/volume)2020-04-15 23:15:00





             Test Item    Value        Reference Range Interpretation Comments

 

             Urine Glucose (UA) (test code = >1000 mg/dL                        

    



             64042-4)                                            



[facility] ketones [facility] by automated test strip 
(mass/volume)2020-04-15 23:15:00





             Test Item    Value        Reference Range Interpretation Comments

 

             Urine Ketones (test code = >150 mg/dL                             



             55563-9)                                            



[facility] ElizabethUrine erythrocytes count by automated test strip 
(number/volume)2020-04-15 23:15:00





             Test Item    Value        Reference Range Interpretation Comments

 

             Urine Occult Blood (test code = 2+                                 

    



             39459-5)                                            



[facility] nitrite detection by automated test strip
2020-04-15 23:15:00





             Test Item    Value        Reference Range Interpretation Comments

 

             Urine Nitrite (test code = 27310-5) Negative                       

        



[facility] total bilirubin measurement by automated test 
strip (mass/volume)2020-04-15 23:15:00





             Test Item    Value        Reference Range Interpretation Comments

 

             Urine Bilirubin (test code = Negative mg/dL                        

   



             84114-3)                                            



[facility] ElizabethUrine urobilinogen measurement by automated test strip 
(mass/volume)2020-04-15 23:15:00





             Test Item    Value        Reference Range Interpretation Comments

 

             Urine Urobilinogen (test code Negative mg/dL                       

    



             = 44347-8)                                          



[facility] leukocytes count by automated test strip 
(number/volume)2020-04-15 23:15:00





             Test Item    Value        Reference Range Interpretation Comments

 

             Urine Leukocyte Esterase Negative {Vika}/uL                         

  



             (test code = 28964-1)                                        



[facility] urine2020-04-15 23:15:00





             Test Item    Value        Reference Range Interpretation Comments

 

             Microscopic Urinalysis (T) (test code = -----                      

            



             21793-4)                                            



[facility] sediment erythrocyte count by microscopy 
(number/high power field)2020-04-15 23:15:00





             Test Item    Value        Reference Range Interpretation Comments

 

             Urine RBC (test code = 78182-1) 11-30 /[HPF]                       

    



[facility] leukocyte count by microscopy 
(number/high power field)2020-04-15 23:15:00





             Test Item    Value        Reference Range Interpretation Comments

 

             Urine WBC (test code = 5821-4) 0-5 /[HPF]                          

   



[facility] epithelial cell count by microscopy 
(number/high power field)2020-04-15 23:15:00





             Test Item    Value        Reference Range Interpretation Comments

 

             Urine Epithelial Cells (test None Seen /[HPF]                      

     



             code = 5787-7)                                        



[facility] crystal count by microscopy (number/high 
power field)2020-04-15 23:15:00





             Test Item    Value        Reference Range Interpretation Comments

 

             Urine Crystals (test code = None Seen /[HPF]                       

    



             51872-0)                                            



[facility] sediment bacteria count by microscopy (number/high
 power field)2020-04-15 23:15:00





             Test Item    Value        Reference Range Interpretation Comments

 

             Urine Bacteria (test code = None Seen /[HPF]                       

    



             5769-5)                                             



[facility] sediment casts count by microscopy (number/low 
power field)2020-04-15 23:15:00





             Test Item    Value        Reference Range Interpretation Comments

 

             Urine Casts (test code = None Seen /[LPF]                          

 



             9842-6)                                             



[facility] sediment hyaline cast count by microscopy 
(number/low power field)2020-04-15 23:15:00





             Test Item    Value        Reference Range Interpretation Comments

 

             Urine Hyaline Casts (test None Seen /[LPF]                         

  



             code = 5796-8)                                        



[facility] Elizabeth[facility]east detection in urine sediment by light microscopy
2020-04-15 23:15:00





             Test Item    Value        Reference Range Interpretation Comments

 

             Urine Yeast (test code = None Seen /[HPF]                          

 



             01579-8)                                            



[facility] comment 022020-04-15 23:15:00





             Test Item    Value        Reference Range Interpretation Comments

 

             Urinalysis Comment (test code = 8262-8) *                          

            



[facility] 032020-04-15 23:15:00





             Test Item    Value        Reference Range Interpretation Comments

 

             Urine Culture Indicated (test code = Not Ind                       

         



             8264-4)                                             



[facility] methamphetamine screen2020-04-15 23:15:00





             Test Item    Value        Reference Range Interpretation Comments

 

             Urine Methamphetamines Screen Negative ng/mL                       

    



             (test code = 00167-4)                                        



[facility] propoxyphene screening test2020-04-15 23:15:00





             Test Item    Value        Reference Range Interpretation Comments

 

             Urine Propoxyphene Screen Negative ng/mL                           



             (test code = 53044-3)                                        



[facility] amphetamines detection by screening method
2020-04-15 23:15:00





             Test Item    Value        Reference Range Interpretation Comments

 

             Urine Amphetamines Screen Negative ng/mL                           



             (test code = 56641-5)                                        



[facility] buprenorphine screen with reflex confirmation
2020-04-15 23:15:00





             Test Item    Value        Reference Range Interpretation Comments

 

             Urine Buprenorphine (test code Negative ng/mL                      

     



             = 3414-0)                                           



[facility] barbiturates detection by screening method
2020-04-15 23:15:00





             Test Item    Value        Reference Range Interpretation Comments

 

             Urine Barbiturates Screen Negative ng/mL                           



             (test code = 69712-7)                                        



[facility] benzodiazepines detection by screening method
2020-04-15 23:15:00





             Test Item    Value        Reference Range Interpretation Comments

 

             Urine Benzodiazepines Screen Negative ng/mL                        

   



             (test code = 20045-2)                                        



[facility] benzoylecgonine detection by screening method
2020-04-15 23:15:00





             Test Item    Value        Reference Range Interpretation Comments

 

             Urine Cocaine Screen (test Negative ng/mL                          

 



             code = 43079-6)                                        



[facility]15 23:15:00





             Test Item    Value        Reference Range Interpretation Comments

 

             Urine Methadone, Qualitative Negative ng/mL                        

   



             (test code = 19550-3)                                        



[facility] opiates screening test2020-04-15 23:15:00





             Test Item    Value        Reference Range Interpretation Comments

 

             Urine Opiates Screen (test Negative ng/mL                          

 



             code = 72613-0)                                        



[facility] ElizabethUrine phencyclidine detection by screening method
2020-04-15 23:15:00





             Test Item    Value        Reference Range Interpretation Comments

 

             Urine Phencyclidine Screen Negative ng/mL                          

 



             (test code = 34876-0)                                        



[facility] ElizabethUrine cannabinoids detection by screening method
2020-04-15 23:15:00





             Test Item    Value        Reference Range Interpretation Comments

 

             Urine Cannabinoids (test code Negative ng/mL                       

    



             = 61777-4)                                          



[facility]Scre[facility] urine tricyclic antidepressants detection
2020-04-15 23:15:00





             Test Item    Value        Reference Range Interpretation Comments

 

             Ur Tricyclic Antidepressants Negative ng/mL                        

   



             Screen (test code = 36051-3)                                       

 



[facility] ElizabethUrine oxycodone detection by screening method2020-04-15 
23:15:00





             Test Item    Value        Reference Range Interpretation Comments

 

             Urine Oxycodone Screen (test Negative ng/mL                        

   



             code = 49669-3)                                        



[facility] ElizabethSpecific gravity of Urine by Automated test strip
2020-04-15 23:15:00





             Test Item    Value        Reference Range Interpretation Comments

 

             Urine Specific Gravity (test code = 1.023                          

        



             20294-6)                                            



[facility] pH measurement by automated test strip2020-04-15 
23:15:00





             Test Item    Value        Reference Range Interpretation Comments

 

             Urine pH (test code = 90426-8) 5.5                                 

   



[facility] drug screen comment interpretation2020-04-15 
23:15:00





             Test Item    Value        Reference Range Interpretation Comments

 

             Urine Drug Screen Comment (test code See Note                      

         



             = 10925-5)                                          



[facility] methamphetamine screen2020-04-15 23:15:00





             Test Item    Value        Reference Range Interpretation Comments

 

             Urine Methamphetamines Screen Negative ng/mL                       

    



             (test code = 70997-6)                                        



[facility] ElizabethUrine propoxyphene screening test2020-04-15 23:15:00





             Test Item    Value        Reference Range Interpretation Comments

 

             Urine Propoxyphene Screen Negative ng/mL                           



             (test code = 57017-1)                                        



[facility] ElizabethUrine amphetamines detection by screening method
2020-04-15 23:15:00





             Test Item    Value        Reference Range Interpretation Comments

 

             Urine Amphetamines Screen Negative ng/mL                           



             (test code = 35073-7)                                        



[facility] ElizabethU[facility] buprenorphine screen with reflex confirmation
2020-04-15 23:15:00





             Test Item    Value        Reference Range Interpretation Comments

 

             Urine Buprenorphine (test code Negative ng/mL                      

     



             = 3414-0)                                           



[facility] ElizabethUrine barbiturates detection by screening method
2020-04-15 23:15:00





             Test Item    Value        Reference Range Interpretation Comments

 

             Urine Barbiturates Screen Negative ng/mL                           



             (test code = 07188-5)                                        



[facility] ElizabethUrine benzodiazepines detection by screening method
2020-04-15 23:15:00





             Test Item    Value        Reference Range Interpretation Comments

 

             Urine Benzodiazepines Screen Negative ng/mL                        

   



             (test code = 46255-7)                                        



[facility] ElizabethUrine benzoylecgonine detection by screening method
2020-04-15 23:15:00





             Test Item    Value        Reference Range Interpretation Comments

 

             Urine Cocaine Screen (test Negative ng/mL                          

 



             code = 62631-3)                                        



[facility] ElizabethUrine methadone screen2020-04-15 23:15:00





             Test Item    Value        Reference Range Interpretation Comments

 

             Urine Methadone, Qualitative Negative ng/mL                        

   



             (test code = 19550-3)                                        



[facility] ElizabethUrine opiates screening test2020-04-15 23:15:00





             Test Item    Value        Reference Range Interpretation Comments

 

             Urine Opiates Screen (test Negative ng/mL                          

 



             code = 43229-1)                                        



[facility] ElizabethUrine phencyclidine detection by screening method
2020-04-15 23:15:00





             Test Item    Value        Reference Range Interpretation Comments

 

             Urine Phencyclidine Screen Negative ng/mL                          

 



             (test code = 96809-1)                                        



[facility] ElizabethUrine cannabinoids detection by screening method
2020-04-15 23:15:00





             Test Item    Value        Reference Range Interpretation Comments

 

             Urine Cannabinoids (test code Negative ng/mL                       

    



             = 76422-0)                                          



[facility] ElizabethScreening urine tricyclic antidepressants detection
2020-04-15 23:15:00





             Test Item    Value        Reference Range Interpretation Comments

 

             Ur Tricyclic Antidepressants Negative ng/mL                        

   



             Screen (test code = 03525-5)                                       

 



[facility] ElizabethUrine oxycodone detection by screening method2020-04-15 
23:15:00





             Test Item    Value        Reference Range Interpretation Comments

 

             Urine Oxycodone Screen (test Negative ng/mL                        

   



             code = 80884-2)                                        



[facility] ElizabethSpecific gravity of Urine by Automated test strip
2020-04-15 23:15:00





             Test Item    Value        Reference Range Interpretation Comments

 

             Urine Specific Gravity (test code = 1.023                          

        



             18730-3)                                            



[facility]2020[facility] 
23:15:00





             Test Item    Value        Reference Range Interpretation Comments

 

             Urine pH (test code = 74535-2) 5.5                                 

   



[facility] ElizabethUrine drug screen comment interpretation2020-04-15 
23:15:00





             Test Item    Value        Reference Range Interpretation Comments

 

             Urine Drug Screen Comment (test code See Note                      

         



             = 57980-3)                                          



[facility]2020-04-15 20:36:00





             Test Item    Value        Reference Range Interpretation Comments

 

             Venous Blood pH (test code = 2746-6) 7.204                         

         



[facility]University [facility] partial pressure of carbon dioxide 
[id number] 20:36:00





             Test Item    Value        Reference Range Interpretation Comments

 

             Blood Gas PCO2 (test code = 20.7 mm[Hg]                            



             1-4)                                             



[facility] partial pressure of oxygen measurement
2020-04-15 20:36:00





             Test Item    Value        Reference Range Interpretation Comments

 

             Blood Gas PO2 (test code = 37.1 mm[Hg]                            



             5-2)                                             



[facility] (moles/volume)
2020-04-15 20:36:00





             Test Item    Value        Reference Range Interpretation Comments

 

             Venous Blood HCO3 (test code = 8.0 mmol/L                          

   



             22116-0)                                            



[facility] excess determination by 
calculation (moles/volume)2020-04-15 20:36:00





             Test Item    Value        Reference Range Interpretation Comments

 

             Venous Blood Base Excess (test -18.0 mmol/L                        

   



             code = 1927-3)                                        



[facility] MariebeBlood hemoglobin measurement by oximetry (mass/volume)
2020-04-15 20:36:00





             Test Item    Value        Reference Range Interpretation Comments

 

             Venous Blood Hemoglobin (test code 12.2 g/dL                       

       



             = 91596-0)                                          



[facility] blood oxygen saturation (mass fraction)2020-04-15
 20:36:00





             Test Item    Value        Reference Range Interpretation Comments

 

             Venous Blood Oxygen Saturation (test 67.8 %                        

         



             code = 2711-0)                                        



[facility] blood carboxyhemoglobin/total hemoglobin ratio
2020-04-15 20:36:00





             Test Item    Value        Reference Range Interpretation Comments

 

             Venous Blood Carboxyhemoglobin (test 0.7 %                         

         



             code = 2032-1)                                        



[facility] whole blood methemoglobin/total hemoglobin (mass 
fraction)2020-04-15 20:36:00





             Test Item    Value        Reference Range Interpretation Comments

 

             Venous Blood Methemoglobin (test code = 0.3 %                      

            



             2617-9)                                             



[facility] blood carboxyhemoglobin/total hemoglobin ratio
2020-04-15 20:36:00





             Test Item    Value        Reference Range Interpretation Comments

 

             Venous Blood Oxyhemoglobin (test code 67.1 %                       

          



             = 2032-1)                                           



[facility] blood deoxyhemoglobin/total hemoglobin mass ratio
2020-04-15 20:36:00





             Test Item    Value        Reference Range Interpretation Comments

 

             Reduced Hemoglobin (test code = 31.9 %                             

    



             80543-5)                                            



[facility] by calculation2020-04-15 
20:36:00





             Test Item    Value        Reference Range Interpretation Comments

 

             Venous Blood Oxygen Content (test 11.5 mL/dL                       

      



             code = 28483-2)                                        



[facility] LaurieGas deliv source Respiratory2020-04-15 20:36:00





             Test Item    Value        Reference Range Interpretation Comments

 

             Oxygen Delivery Device (test code = NA                             

        



             84031-8)                                            



[facility] ElizabethSpecimen drawn from Patient2020-04-15 20:36:00





             Test Item    Value        Reference Range Interpretation Comments

 

             Blood Gas Puncture Site (test code = Venous                        

         



             05768-7)                                            



[facility]Po[facility] HospitalAssessment [facility] artery patency prior to arterial 
puncture2020-04-15 20:36:00





             Test Item    Value        Reference Range Interpretation Comments

 

             [place] Test (test code = Not Applicable                           



             41637-0)                                            



[facility] [id number] 20:36:00





             Test Item    Value        Reference Range Interpretation Comments

 

             Blood Gas Critical Value Called Dr. Sabas MD                      

     



             To (test code = 8264-4)                                        



[facility] [id number] 20:36:00





             Test Item    Value        Reference Range Interpretation Comments

 

             Blood Gas Notified Time (test [id number]                       

    



             code = 8265-1)                                        



[facility] ElizabethSerum or plasma lipase measurement (enzymatic 
activity/volume)2020-04-15 20:34:00





             Test Item    Value        Reference Range Interpretation Comments

 

             Lipase (test code = 3040-3) 17 U/L                                 



[facility] ElibethOsmolality ser/plas2020-04-15 20:34:00





             Test Item    Value        Reference Range Interpretation Comments

 

             Serum Osmolality (test code = 324 mosm/kg                          

  



             2692-2)                                             



[facility] ElizabethSerum or plasma lipase measurement (enzymatic 
activity/volume)2020-04-15 20:34:00





             Test Item    Value        Reference Range Interpretation Comments

 

             Lipase (test code = 3040-3) 17 U/L                                 



[facility] ElizabethOsmolality ser/plas2020-04-15 20:34:00





             Test Item    Value        Reference Range Interpretation Comments

 

             Serum Osmolality (test code = 324 mosm/kg                          

  



             2692-2)                                             



[facility]. ElibeCapillary whole blood glucose measurement by glucometer 
(mass/volume)2020 11:25:00





             Test Item    Value        Reference Range Interpretation Comments

 

             Bedside Glucose (test code = 198 mg/dL                             

 



             37281-5)                                            



[facility] (number/volume)
2020 06:08:00





             Test Item    Value        Reference Range Interpretation Comments

 

             White Blood Count (test code = 7.6 10*3/uL                         

   



             6690-2)                                             



[facility] (number/volume)
2020 06:08:00





             Test Item    Value        Reference Range Interpretation Comments

 

             Red Blood Count (test code = 3.32 10*6/uL                          

 



             789-8)                                              



[facility] hemoglobin [facility] (mass/volume)2020 
06:08:00





             Test Item    Value        Reference Range Interpretation Comments

 

             Hemoglobin (test code = 718-7) 10.2 g/dL                           

   



[facility] hematocrit (volume fraction)2020 
06:08:00





             Test Item    Value        Reference Range Interpretation Comments

 

             Hematocrit (test code = 4544-3) 33.6 %                             

    



[facility] erythrocyte mean corpuscular volume (MCV) 
pahfisvtouw4045-55-35 06:08:00





             Test Item    Value        Reference Range Interpretation Comments

 

             Mean Corpuscular Volume (test code = 101 fL                        

         



             787-2)                                              



[facility] mean corpuscular hemoglobin (mass 
per erythrocyte)2020 06:08:00





             Test Item    Value        Reference Range Interpretation Comments

 

             Mean Corpuscular Hemoglobin (test 30.7 pg                          

      



             code = 785-6)                                        



[facility] erythrocyte mean corpuscular hemoglobin 
concentration measurement (mass/prz1320-47-18 06:08:00





             Test Item    Value        Reference Range Interpretation Comments

 

             Mean Corpuscular Hemoglobin Concent 30.4 g/dL                      

        



             (test code = 786-4)                                        



[facility] erythrocyte distribution width mpmkq8185-01-32
 06:08:00





             Test Item    Value        Reference Range Interpretation Comments

 

             Red Cell Distribution Width (test code 13.6 %                      

           



             = 788-0)                                            



[facility] platelet count (count/volume)2020 
06:08:00





             Test Item    Value        Reference Range Interpretation Comments

 

             Platelet Count (test code = 125 10*3/uL                            



             777-3)                                              



[facility] platelet mean volume measurement
2020 06:08:00





             Test Item    Value        Reference Range Interpretation Comments

 

             Mean Platelet Volume (test code = 11.8                             

      



             60457-0)                                            



[facility] neutrophil count as percentage of total 
odyzlisgny6357-60-07 06:08:00





             Test Item    Value        Reference Range Interpretation Comments

 

             Neutrophils (%) (Auto) (test code = 70 %                           

        



             770-8)                                              



[facility]Autom[facility] blood immature granulocyte count as percentage
 of total qdmeoiemkw7399-92-02 06:08:00





             Test Item    Value        Reference Range Interpretation Comments

 

             Immature Granulocyte % (Auto) (test 2 %                            

        



             code = 84802-3)                                        



[facility] lymphocyte count as percentage of total 
bfsjhgyuom6469-06-80 06:08:00





             Test Item    Value        Reference Range Interpretation Comments

 

             Lymphocytes (%) (Auto) (test code = 16 %                           

        



             736-9)                                              



[facility] ElizabethAutom[facility] blood monocyte count as percentage of total 
dfzrjgtmjm8545-53-14 06:08:00





             Test Item    Value        Reference Range Interpretation Comments

 

             Monocytes (%) (Auto) (test code = 12 %                             

      



             5905-5)                                             



[facility] ElizabethAutomated blood eosinophil count as percentage of total 
jmcacxwppg1533-74-39 06:08:00





             Test Item    Value        Reference Range Interpretation Comments

 

             Eosinophils (%) (Auto) (test code = 0 %                            

        



             713-8)                                              



[facility] blood basophil count as percentage of total 
kmsbrgvxkk5888-01-13 06:08:00





             Test Item    Value        Reference Range Interpretation Comments

 

             Basophils (%) (Auto) (test code = 0 %                              

      



             706-2)                                              



[facility]Autom[facility] blood nucleated erythrocyte count as 
percentage of total corfnkoqfn7439-40-84 06:08:00





             Test Item    Value        Reference Range Interpretation Comments

 

             Nucleated Red Blood Cells % (test code 0.0 %                       

           



             = 53432-3)                                          



[facility] neutrophil count (number/volume)
2020 06:08:00





             Test Item    Value        Reference Range Interpretation Comments

 

             Neutrophils # (Auto) (test code = 5.4 10*3/uL                      

      



             751-8)                                              



[facility]Autom[facility] immature granulocyte count as percentage
 of total kdbqsbetqv2780-83-78 06:08:00





             Test Item    Value        Reference Range Interpretation Comments

 

             Immature Granulocyte # (Auto) 0.1 10*3/uL                          

  



             (test code = 56525-7)                                        



[facility]Autom[facility] lymphocyte count (number/volume)
2020 06:08:00





             Test Item    Value        Reference Range Interpretation Comments

 

             Lymphocytes # (Auto) (test code = 1.2 10*3/uL                      

      



             731-0)                                              



[facility] ElizabethBlood monocytes automated count (number/volume)
2020 06:08:00





             Test Item    Value        Reference Range Interpretation Comments

 

             Monocytes # (Auto) (test code = 0.9 10*3/uL                        

    



             742-7)                                              



[facility] ElizabethAutomated blood eosinophil [id number] 06:08:00





             Test Item    Value        Reference Range Interpretation Comments

 

             Eosinophils # (Auto) (test code = 0.0 10*3/uL                      

      



             711-2)                                              



[facility] ElizabethAutom[facility] blood basophil count (number/volume)2020
 06:08:00





             Test Item    Value        Reference Range Interpretation Comments

 

             Basophils # (Auto) (test code = 0.0 10*3/uL                        

    



             704-7)                                              



[facility] ElizabethAutomated blood nucleated erythrocyte count 
(count/volume)2020 06:08:00





             Test Item    Value        Reference Range Interpretation Comments

 

             Nucleated Red Blood Cells # 0.00 10*3/uL                           



             (test code = 771-6)                                        



[facility] ElizabethService comment [id number] 06:08:00





             Test Item    Value        Reference Range Interpretation Comments

 

             Manual Differential (test code = Not Ind                           

     



             8265-1)                                             



[facility] ElizabethSerum or plasma sodium measurement (moles/volume)
2020 06:08:00





             Test Item    Value        Reference Range Interpretation Comments

 

             Sodium Level (test code = 2951-2) 136 mmol/L                       

      



[facility] ElizabethSer[facility] or plasma potassium measurement (moles/volume)
2020 06:08:00





             Test Item    Value        Reference Range Interpretation Comments

 

             Potassium Level (test code = 4.6 mmol/L                            

 



             2823-3)                                             



[facility] ElizabethSerum or plasma chloride measurement (moles/volume)
2020 06:08:00





             Test Item    Value        Reference Range Interpretation Comments

 

             Chloride Level (test code = 116 mmol/L                             



             5-0)                                             



[facility] ElizabethSerum or plasma total carbon dioxide measurement 
(moles/volume)2020 06:08:00





             Test Item    Value        Reference Range Interpretation Comments

 

             Carbon Dioxide Level (test code = 13 mmol/L                        

      



             2028)                                             



[facility] ElizabethSerum or plasma anion gap determination (moles/volume)
2020 06:08:00





             Test Item    Value        Reference Range Interpretation Comments

 

             Anion Gap (test code = 29326-8) 12                                 

    



[facility] or plasma urea nitrogen measurement (mass/volume)
2020 06:08:00





             Test Item    Value        Reference Range Interpretation Comments

 

             Blood Urea Nitrogen (test code = 26 mg/dL                          

     



             3094-0)                                             



[facility]abeLand[facility] Centererum or plasma creatinine measurement (mass/volume)
2020 06:08:00





             Test Item    Value        Reference Range Interpretation Comments

 

             Creatinine (test code = 2160-0) 1.7 mg/dL                          

    



[facility] [id number] 06:08:00





             Test Item    Value        Reference Range Interpretation Comments

 

             Estimat Glomerular Filtration Rate 46                              

       



             (test code = 38630-8)                                        



[facility] ElizabethSerum or plasma glucose measurement (mass/volume)
2020 06:08:00





             Test Item    Value        Reference Range Interpretation Comments

 

             Glucose Level (test code = 2345-7) 353 mg/dL                       

       



[facility] Centererum or plasma calcium measurement (mass/volume)
2020 06:08:00





             Test Item    Value        Reference Range Interpretation Comments

 

             Calcium Level (test code = 30065-1) 6.7 mg/dL                      

        



[facility] CenterbeBlood Jrnasob7170-67-95 18:01:48No growth at 5 days.
Immature platelet gqfocvua9228-50-83 05:15:00





             Test Item    Value        Reference Range Interpretation Comments

 

             Immature Platelet Fraction (test code = 6.9 %                      

            



             28430-9)                                            



[facility] neutrophils/100 leukocytes
2020 05:15:00





             Test Item    Value        Reference Range Interpretation Comments

 

             Neutrophils % (Manual) (test code = 56 %                           

        



             769-0)                                              



[facility] neutrophils form/100 leukocytes
2020 05:15:00





             Test Item    Value        Reference Range Interpretation Comments

 

             Band Neutrophils % (Manual) (test code 4 %                         

           



             = 764-1)                                            



[facility] ElizabethManual blood lymphocytes/100 bugmgiecxk5593-19-17 
05:15:00





             Test Item    Value        Reference Range Interpretation Comments

 

             Lymphocytes % (Manual) (test code = 18 %                           

        



             737-7)                                              



[facility] ElizabethManual blood monocytes/100 [id number] 05:15:00





             Test Item    Value        Reference Range Interpretation Comments

 

             Monocytes % (Manual) (test code = 20 %                             

      



             744-3)                                              



[facility] eosinophil count as percentage of total 
loiwyogele7330-30-93 05:15:00





             Test Item    Value        Reference Range Interpretation Comments

 

             Eosinophils % (Manual) (test code = 2 %                            

        



             714-6)                                              



[facility] platelet detection by light [id number] 
05:15:00





             Test Item    Value        Reference Range Interpretation Comments

 

             Platelet Estimate (test code = Decreased                           

   



             9317-9)                                             



[facility] anisocytosis detection by light microscopy
2020 05:15:00





             Test Item    Value        Reference Range Interpretation Comments

 

             Anisocytosis (test code = 702-1) 1+                                

     



[facility] hypochromia detection by light microscopy
2020 05:15:00





             Test Item    Value        Reference Range Interpretation Comments

 

             Hypochromasia (test code = 728-6) 1+                               

      



[facility] or plasma phosphate measurement (mass/volume)
2020 05:15:00





             Test Item    Value        Reference Range Interpretation Comments

 

             Phosphorus Level (test code = 2.3 mg/dL                            

  



             2777-1)                                             



[facility] or plasma total bilirubin measurement 
(mass/volume)2020 05:15:00





             Test Item    Value        Reference Range Interpretation Comments

 

             Total Bilirubin (test code = 0.4 mg/dL                             

 



             -2)                                             



[facility] Elizabeth[facility] or plasma aspartate aminotransferase measurement 
(enzymatic activity/volume)2020 05:15:00





             Test Item    Value        Reference Range Interpretation Comments

 

             Aspartate Amino Transf (AST/SGOT) 10 U/L                           

      



             (test code = 1920-8)                                        



[facility] Elizabeth[facility] or plasma alanine aminotransferase measurement 
(enzymatic activity/volume)2020 05:15:00





             Test Item    Value        Reference Range Interpretation Comments

 

             Alanine Aminotransferase (ALT/SGPT) 21 U/L                         

        



             (test code = 1742-6)                                        



[facility] ElizabethSer[facility] or plasma protein measurement (mass/volume)
2020 05:15:00





             Test Item    Value        Reference Range Interpretation Comments

 

             Total Protein (test code = 2885-2) 4.4 g/dL                        

       



[facility] ElizabethSer[facility] or plasma albumin measurement (mass/volume)
2020 05:15:00





             Test Item    Value        Reference Range Interpretation Comments

 

             Albumin (test code = 1751-7) 2.3 g/dL                              

 



[facility] ElizabethSerum or plasma alkaline phosphatase measurement 
(enzymatic activity/volume)2020 05:15:00





             Test Item    Value        Reference Range Interpretation Comments

 

             Alkaline Phosphatase (test code = 77 U/L                           

      



             6768-6)                                             



[facility] ElizabethImmature platelet hoctzjyo9847-84-56 05:15:00





             Test Item    Value        Reference Range Interpretation Comments

 

             Immature Platelet Fraction (test code = 6.9 %                      

            



             31718-3)                                            



[facility] neutrophils/100 leukocytes
2020 05:15:00





             Test Item    Value        Reference Range Interpretation Comments

 

             Neutrophils % (Manual) (test code = 56 %                           

        



             769-0)                                              



[facility] band neutrophils form/100 leukocytes
2020 05:15:00





             Test Item    Value        Reference Range Interpretation Comments

 

             Band Neutrophils % (Manual) (test code 4 %                         

           



             = 764-1)                                            



[facility] ElizabethManual blood lymphocytes/100 ciobkktjrx2496-79-45 
05:15:00





             Test Item    Value        Reference Range Interpretation Comments

 

             Lymphocytes % (Manual) (test code = 18 %                           

        



             737-7)                                              



[facility] ElizabethManual blood monocytes/100 hxyssyjbaz1028-41-11 05:15:00





             Test Item    Value        Reference Range Interpretation Comments

 

             Monocytes % (Manual) (test code = 20 %                             

      



             744-3)                                              



[facility] ElizabethManual blood eosinophil count as percentage of total 
oqkpsaxshz3296-24-61 05:15:00





             Test Item    Value        Reference Range Interpretation Comments

 

             Eosinophils % (Manual) (test code = 2 %                            

        



             714-6)                                              



[facility] ElilianabethBlood platelet detection by light dpwvegcqbm7379-51-60 
05:15:00





             Test Item    Value        Reference Range Interpretation Comments

 

             Platelet Estimate (test code = Decreased                           

   



             9317-9)                                             



[facility] ElizabethBlood anisocytosis detection by light microscopy
2020 05:15:00





             Test Item    Value        Reference Range Interpretation Comments

 

             Anisocytosis (test code = 702-1) 1+                                

     



[facility]. ElilianabethWhole blood hypochromia detection by light microscopy
2020 05:15:00





             Test Item    Value        Reference Range Interpretation Comments

 

             Hypochromasia (test code = 728-6) 1+                               

      



[facility] HospitalLian Garciapecimen source [id number] 17:30:00





             Test Item    Value        Reference Range Interpretation Comments

 

             Respiratory Virus Source Nasopharyngeal Swab                       

    



             (test code = 92609-9)                                        



[facility] LfdqspilaFVAE-NyM-0 RNA Resp Ql JOLIE+dfipp9225-48-45 17:30:00





             Test Item    Value        Reference Range Interpretation Comments

 

             Coronavirus (COVID-19)(PCR) (test NEGATIVE                         

      



             code = 64747-4)                                        



[facility] Gabrielnfluenza virus A RNA detection by probe and target 
amplification [id number] 15:30:00





             Test Item    Value        Reference Range Interpretation Comments

 

             Influenza Virus Type A (PCR) (test NEGATIVE                        

       



             code = 96175-6)                                        



[facility] Gabrielnfluenza virus B RNA detection by probe and target 
amplification [id number] 15:30:00





             Test Item    Value        Reference Range Interpretation Comments

 

             Influenza Virus Type B (PCR) (test NEGATIVE                        

       



             code = 92013-9)                                        



[facility] Josetreptococcus pyogenes throat [id number] 15:30:00





             Test Item    Value        Reference Range Interpretation Comments

 

             Group A Streptococcus No Group A Strep                           



             Culture (test code = isolated                               



             54258-4)                                            



[facility] Gabrielnfluenza virus A RNA detection by probe and target 
amplification [id number] 15:30:00





             Test Item    Value        Reference Range Interpretation Comments

 

             Influenza Virus Type A (PCR) (test NEGATIVE                        

       



             code = 63177-9)                                        



[facility] HospitalLian Riosnfluenza virus B RNA detection by probe and target 
amplification [id number] 15:30:00





             Test Item    Value        Reference Range Interpretation Comments

 

             Influenza Virus Type B (PCR) (test NEGATIVE                        

       



             code = 41595-4)                                        



[facility]. Garciatreptococcus pyogenes throat [id number] 15:30:00





             Test Item    Value        Reference Range Interpretation Comments

 

             Group A Streptococcus No Group A Strep                           



             Culture (test code = isolated                               



             99714-5)                                            



[facility] HospitalLian SullivanCrenated erythrocyte detection by light microscopy
2020 04:10:00





             Test Item    Value        Reference Range Interpretation Comments

 

             [place] Cells/Echinocytes (test code = 1+                             

        



             7790-9)                                             



[facility]. ElizabethCrenated erythrocyte detection by light microscopy
2020 04:10:00





             Test Item    Value        Reference Range Interpretation Comments

 

             [place] Cells/Echinocytes (test code = 1+                             

        



             7790-9)                                             



[facility] LaurieManual blood metamyelocytes/100 [id number] 
05:20:00





             Test Item    Value        Reference Range Interpretation Comments

 

             Metamyelocytes % (test code = 740-1) 1 %                           

         



[facility]. ElilianabethBlood erythrocyte morphology finding identification
2020 05:20:00





             Test Item    Value        Reference Range Interpretation Comments

 

             Red Blood Cell Morphology (test code = Normal                      

           



             6742-1)                                             



[facility] metamyelocytes/100 [id number] 
05:20:00





             Test Item    Value        Reference Range Interpretation Comments

 

             Metamyelocytes % (test code = 740-1) 1 %                           

         



[facility]. ElilianabethBlood erythrocyte morphology finding identification
2020 05:20:00





             Test Item    Value        Reference Range Interpretation Comments

 

             Red Blood Cell Morphology (test code = Normal                      

           



             6742-1)                                             



[facility]. ElizabethVenous whole blood potassium measurement (moles/volume)
2020 20:00:00





             Test Item    Value        Reference Range Interpretation Comments

 

             Bedside Potassium (test code = 3.5 mmol/L                          

   



             45950-8)                                            



[facility]. ElizabethVenous whole blood potassium measurement (moles/volume)
2020 20:00:00





             Test Item    Value        Reference Range Interpretation Comments

 

             Bedside Potassium (test code = 3.5 mmol/L                          

   



             04370-2)                                            



[facility] [id number] 11:48:08





             Test Item    Value        Reference Range Interpretation Comments

 

             Glucose POC (test >600 mg/dL                       Result Not

 ConfirmedPOC



             code = Glucose POC)                                        Glucose 

used on



                                                                 critically ill 

patients



                                                                 is considered "

off-label



                                                                 use" and has no

t been



                                                                 cleared or appr

davian by



                                                                 the FDA. Altern

ative



                                                                 testing methods

 should



                                                                 be considered i

f the



                                                                 patient is crit

ically



                                                                 ill.



POC [id number] 11:48:07





             Test Item    Value        Reference Range Interpretation Comments

 

             Glucose POC (test >600 mg/dL                       Ordered La

b DrawMD



             code = Glucose POC)                                        Notified

POC Glucose



                                                                 used on critica

lly ill



                                                                 patients is con

sidered



                                                                 "off-label use"

 and has



                                                                 not been cleare

d or



                                                                 approved by the

 FDA.



                                                                 Alternative guillermina

ting



                                                                 methods should 

be



                                                                 considered if t

he



                                                                 patient is crit

ically



                                                                 ill.



POC [id number] 11:48:06





             Test Item    Value        Reference Range Interpretation Comments

 

             Glucose POC (test >600 mg/dL                       Ordered La

b DrawPOC



             code = Glucose POC)                                        Glucose 

used on



                                                                 critically ill 

patients



                                                                 is considered



                                                                 "off-label use"

 and has



                                                                 not been cleare

d or



                                                                 approved by the

 FDA.



                                                                 Alternative guillermina

ting



                                                                 methods should 

be



                                                                 considered if t

he



                                                                 patient is crit

ically



                                                                 ill.



POC [id number] 11:48:05





             Test Item    Value        Reference Range Interpretation Comments

 

             Glucose POC (test >600 mg/dL                       Result Not

 ConfirmedPOC



             code = Glucose POC)                                        Glucose 

used on



                                                                 critically ill 

patients



                                                                 is considered "

off-label



                                                                 use" and has no

t been



                                                                 cleared or appr

davian by



                                                                 the FDA. Altern

ative



                                                                 testing methods

 should



                                                                 be considered i

f the



                                                                 patient is crit

ically



                                                                 ill.



POC [id number] 11:48:03





             Test Item    Value        Reference Range Interpretation Comments

 

             Glucose POC (test >600 mg/dL                       MD Notifie

dPOC Glucose



             code = Glucose POC)                                        used on 

critically ill



                                                                 patients is con

sidered



                                                                 "off-label use"

 and has



                                                                 not been cleare

d or



                                                                 approved by the

 FDA.



                                                                 Alternative guillermina

ting



                                                                 methods should 

be



                                                                 considered if t

he



                                                                 patient is crit

ically



                                                                 ill.



POC [id number] 11:48:03





             Test Item    Value        Reference Range Interpretation Comments

 

             Glucose POC (test >600 mg/dL                       MD Dao

dPFABIAN Glucose



             code = Glucose POC)                                        used on 

critically ill



                                                                 patients is con

sidered



                                                                 "off-label use"

 and has



                                                                 not been cleare

d or



                                                                 approved by the

 FDA.



                                                                 Alternative guillermina

ting



                                                                 methods should 

be



                                                                 considered if t

he



                                                                 patient is crit

ically



                                                                 ill.



Serum or plasma magnesium measurement (mass/volume)2020 03:15:00





             Test Item    Value        Reference Range Interpretation Comments

 

             Magnesium Level (test code = 2.03 mg/dL                            

 



             35589-7)                                            



[facility]volume)
2020 01:47:00





             Test Item    Value        Reference Range Interpretation Comments

 

             Bedside Lactic Acid Venous (test 1.50 mmol/L                       

     



             code = 2519-7)                                        



[facility]. ElizabethVenous blood lactic acid measurement (moles/volume)
2020 01:47:00





             Test Item    Value        Reference Range Interpretation Comments

 

             Bedside Lactic Acid Venous (test 1.50 mmol/L                       

     



             code = 2519-7)                                        



[facility]. [facility]Land[facility] Centererum [facility] ethanol measurement (mass/volume)
2020 01:40:00





             Test Item    Value        Reference Range Interpretation Comments

 

             Ethyl Alcohol Level (test code = < 10 mg/dL                        

     



             5643-2)                                             



[facility]. ElizabethSerum or plasma ethanol measurement (mass/volume)
2020 01:40:00





             Test Item    Value        Reference Range Interpretation Comments

 

             Ethyl Alcohol Level (test code = < 10 mg/dL                        

     



             5643-2)                                             



[facility] by [id number] 21:33:00





             Test Item    Value        Reference Range Interpretation Comments

 

             Lactic Acid Comment (test code Reprint/Called                      

     



             = Lactic Acid Comment)                                        



[facility] Limapatitis B [facility] ab ser/plas qual by [id number] 21:33:00





             Test Item    Value        Reference Range Interpretation Comments

 

             Lactic Acid Comment (test code Reprint/Called                      

     



             = Lactic Acid Comment)                                        



[facility] [id number] 20:42:26





             Test Item    Value        Reference Range Interpretation Comments

 

             Glucose POC (test 88 mg/dL                         POC Glucos

e used on



             code = Glucose POC)                                        critical

ly ill patients



                                                                 is considered "

off-label



                                                                 use" and has no

t been



                                                                 cleared or appr

davian by



                                                                 the FDA. Altern

ative



                                                                 testing methods

 should



                                                                 be considered i

f the



                                                                 patient is crit

ically



                                                                 ill.



Venous whole blood pH [id number] 20:24:00





             Test Item    Value        Reference Range Interpretation Comments

 

             Venous Blood pH (test code = 2746-6) 7.305                         

         



[facility] partial pressure of carbon dioxide 
mtshmtvgrfg7422-11-66 20:24:00





             Test Item    Value        Reference Range Interpretation Comments

 

             Blood Gas PCO2 (test code = 29.6 mm[Hg]                            



             -4)                                             



[facility]. ElizabethVenous blood partial pressure of oxygen measurement
2020 20:24:00





             Test Item    Value        Reference Range Interpretation Comments

 

             Blood Gas PO2 (test code = 32.9 mm[Hg]                            



             5-2)                                             



[facility]. [facility]University [facility] Centerous [facility] bicarbonate measurement (moles/volume)
2020 20:24:00





             Test Item    Value        Reference Range Interpretation Comments

 

             Venous Blood HCO3 (test code = 14.4 mmol/L                         

   



             91375-7)                                            



[facility]. [facility]abeUniversity [facility] Centerous whole blood base excess determination by 
calculation (moles/volume)2020 20:24:00





             Test Item    Value        Reference Range Interpretation Comments

 

             Venous Blood Base Excess (test -10.6 mmol/L                        

   



             code = 1927-3)                                        



[facility]. ElibethBlood hemoglobin measurement by oximetry (mass/volume)
2020 20:24:00





             Test Item    Value        Reference Range Interpretation Comments

 

             Venous Blood Hemoglobin (test code 12.2 g/dL                       

       



             = 30620-7)                                          



[facility]. ElizabethVenous blood oxygen saturation (mass fraction)2020
 20:24:00





             Test Item    Value        Reference Range Interpretation Comments

 

             Venous Blood Oxygen Saturation (test 55.1 %                        

         



             code = 2711-0)                                        



[facility]. ElizabethVenous blood carboxyhemoglobin/total hemoglobin ratio
2020 20:24:00





             Test Item    Value        Reference Range Interpretation Comments

 

             Venous Blood Carboxyhemoglobin (test 0.3 %                         

         



             code = 2032-1)                                        



[facility] ElizabethVenous whole blood methemoglobin/total hemoglobin (mass 
fraction)2020 20:24:00





             Test Item    Value        Reference Range Interpretation Comments

 

             Venous Blood Methemoglobin (test code = 0.3 %                      

            



             2617-9)                                             



[facility] blood carboxyhemoglobin/total hemoglobin ratio
2020 20:24:00





             Test Item    Value        Reference Range Interpretation Comments

 

             Venous Blood Oxyhemoglobin (test code 54.8 %                       

          



             = 2032-1)                                           



[facility] blood deoxyhemoglobin/total hemoglobin mass ratio
2020 20:24:00





             Test Item    Value        Reference Range Interpretation Comments

 

             Reduced Hemoglobin (test code = 44.6 %                             

    



             64517-2)                                            



[facility] blood oxygen content by [id number] 
20:24:00





             Test Item    Value        Reference Range Interpretation Comments

 

             Venous Blood Oxygen Content (test 9.4 mL/dL                        

      



             code = 66225-8)                                        



[facility] deliv source [id number] 20:24:00





             Test Item    Value        Reference Range Interpretation Comments

 

             Oxygen Delivery Device (test code = ROOM AIR                       

        



             78349-6)                                            



[facility] [id number] 20:24:00





             Test Item    Value        Reference Range Interpretation Comments

 

             Blood Gas Puncture Site (test code = Venous                        

         



             23053-8)                                            



[facility] HospitalAssessment of wrist artery patency prior to arterial 
[id number] 20:24:00





             Test Item    Value        Reference Range Interpretation Comments

 

             [place] Test (test code = Not Applicable                           



             11113-0)                                            



[facility]  [id number] 20:24:00





             Test Item    Value        Reference Range Interpretation Comments

 

             Blood Gas Critical Value Called NOHEMY HODGE MD                        

    



             To (test code = 8264-4)                                        



[facility]  [id number] 20:24:00





             Test Item    Value        Reference Range Interpretation Comments

 

             Blood Gas Notified Time (test [id number]                       

    



             code = 8265-1)                                        



[facility] toxic granules detection by light microscopy
2020 20:15:00





             Test Item    Value        Reference Range Interpretation Comments

 

             Toxic Granulation (test code = 803-7) 1+                           

          



[facility] toxic [facility]oles detection by light 
lisfgrijhv3873-03-08 20:15:00





             Test Item    Value        Reference Range Interpretation Comments

 

             Toxic Vacuolation (test code = 33317-2) 1+                         

            



[facility] dohle body detection by light [id number]
 20:15:00





             Test Item    Value        Reference Range Interpretation Comments

 

             Dohle Bodies (test code = 7792-5) 3+                               

      



[facility]th[facility] (PT) in platelet poor [id number] 
20:15:00





             Test Item    Value        Reference Range Interpretation Comments

 

             Prothrombin Time (test code = 5902-2) 15.4 s                       

          



[facility] Platelet poor plasma by Coagulation assay
2020 20:15:00





             Test Item    Value        Reference Range Interpretation Comments

 

             Prothromb Time International 1.6 {ratio}                           

 



             Ratio (test code = 6301-6)                                        



[facility] Elizabeth[facility] partial thromboplastin time (PTT)2020 
20:15:00





             Test Item    Value        Reference Range Interpretation Comments

 

             Activated Partial Thromboplast Time 33.2 s                         

        



             (test code = 12818-5)                                        



[facility] plasma lipase measurement (enzymatic 
activity/volume)2020 20:15:00





             Test Item    Value        Reference Range Interpretation Comments

 

             Lipase (test code = 3040-3) 35 U/L                                 



[facility] plasma beta hydroxybutyrate measurement 
(moles/volume)2020 20:15:00





             Test Item    Value        Reference Range Interpretation Comments

 

             Beta-Hydroxybutyric Acid (test 7.58 mmol/L                         

   



             code = 6873-4)                                        



[facility] or plasma cardiac troponin I measurement 
(mass/volume)2020 20:15:00





             Test Item    Value        Reference Range Interpretation Comments

 

             Troponin I (test code = 43481-5) 8.52 ng/mL                        

     



[facility] ElizabethB[facility] blood [id number] 20:15:00





             Test Item    Value        Reference Range Interpretation Comments

 

             Blood Culture (test code No growth in 5 days                       

    



             = 600-7)                                            



[facility] toxic granules detection by light microscopy
2020 20:15:00





             Test Item    Value        Reference Range Interpretation Comments

 

             Toxic Granulation (test code = 803-7) 1+                           

          



[facility] leukocyte toxic vacuoles detection by light 
pglizrgkxi4809-57-20 20:15:00





             Test Item    Value        Reference Range Interpretation Comments

 

             Toxic Vacuolation (test code = 85340-0) 1+                         

            



[facility] dohle body detection by light ynfinfsgpt9797-75-82
 20:15:00





             Test Item    Value        Reference Range Interpretation Comments

 

             Dohle Bodies (test code = 7792-5) 3+                               

      



[facility]thrombin time (PT) in platelet poor [id number] 
20:15:00





             Test Item    Value        Reference Range Interpretation Comments

 

             Prothrombin Time (test code = 5902-2) 15.4 s                       

          



[facility] in Platelet poor plasma by Coagulation assay
2020 20:15:00





             Test Item    Value        Reference Range Interpretation Comments

 

             Prothromb Time International 1.6 {ratio}                           

 



             Ratio (test code = 6301-6)                                        



[facility] Elizabeth[facility] partial thromboplastin time (PTT)2020 
20:15:00





             Test Item    Value        Reference Range Interpretation Comments

 

             Activated Partial Thromboplast Time 33.2 s                         

        



             (test code = 01853-6)                                        



[facility] or plasma cardiac troponin I measurement 
(mass/volume)2020 20:15:00





             Test Item    Value        Reference Range Interpretation Comments

 

             Troponin I (test code = 67251-4) 8.52 ng/mL                        

     



[facility] ElizabethB[facility] blood [id number] 20:15:00





             Test Item    Value        Reference Range Interpretation Comments

 

             Blood Culture (test code No growth in 5 days                       

    



             = 600-7)                                            



[facility] ElizabethUrinalysis specimen collection [id number] 19:47:00





             Test Item    Value        Reference Range Interpretation Comments

 

             Urine Source (test code = 19159-3) URCATH                          

       



[facility] Elizabeth[facility] of Urine by [id number] 19:47:00





             Test Item    Value        Reference Range Interpretation Comments

 

             Urine Color (test code = 73683-7) Lt Yellow                        

      



[facility] ElizabethUrine clarity dgdwggkpguagh4107-56-07 19:47:00





             Test Item    Value        Reference Range Interpretation Comments

 

             Urine Appearance (test code = 88454-3) Clear                       

           



[facility]. TimothyUrine pH measurement by automated test [id number] 
19:47:00





             Test Item    Value        Reference Range Interpretation Comments

 

             Urine pH (test code = 47846-8) 5.5                                 

   



[facility] - . ElilianabethSpecific gravity of Urine by Automated test strip
2020 19:47:00





             Test Item    Value        Reference Range Interpretation Comments

 

             Urine Specific Gravity (test code = 1.014                          

        



             82866-3)                                            



[facility] protein [facility] by automated test strip 
(mass/volume)2020 19:47:00





             Test Item    Value        Reference Range Interpretation Comments

 

             Urine Protein (test code = 74789-8) 70 mg/dL                       

        



[facility] glucose measurement by automated test strip 
(mass/volume)2020 19:47:00





             Test Item    Value        Reference Range Interpretation Comments

 

             Urine Glucose (UA) (test code = >1000 mg/dL                        

    



             18946-3)                                            



[facility] ketones measurement by automated test strip 
(mass/volume)2020 19:47:00





             Test Item    Value        Reference Range Interpretation Comments

 

             Urine Ketones (test code = 02507-5) 10 mg/dL                       

        



[facility] erythrocytes count by automated test strip 
(number/volume)2020 19:47:00





             Test Item    Value        Reference Range Interpretation Comments

 

             Urine Occult Blood (test code = 2+                                 

    



             32540-5)                                            



[facility] nitrite detection by automated test strip
2020 19:47:00





             Test Item    Value        Reference Range Interpretation Comments

 

             Urine Nitrite (test code = 12016-2) Negative                       

        



[facility]. [place]ountvillebethUrine total bilirubin measurement by automated test 
strip (mass/volume)2020 19:47:00





             Test Item    Value        Reference Range Interpretation Comments

 

             Urine Bilirubin (test code = Negative mg/dL                        

   



             94664-8)                                            



[facility]. ElizabethUrine urobilinogen measurement by automated test strip 
(mass/volume)2020 19:47:00





             Test Item    Value        Reference Range Interpretation Comments

 

             Urine Urobilinogen (test code Negative mg/dL                       

    



             = 38808-4)                                          



[facility] leukocytes count by automated test strip 
(number/volume)2020 19:47:00





             Test Item    Value        Reference Range Interpretation Comments

 

             Urine Leukocyte Esterase Negative {Vika}/uL                         

  



             (test code = 31112-2)                                        



[facility] Hospital[facility] TrotterthMicroscopic examination of [id number] 19:47:00





             Test Item    Value        Reference Range Interpretation Comments

 

             Microscopic Urinalysis (T) (test code = -----                      

            



             87542-5)                                            



[facility]. [facility] sediment erythrocyte count by microscopy 
(number/high power field)2020 19:47:00





             Test Item    Value        Reference Range Interpretation Comments

 

             Urine RBC (test code = 85711-4) 11-30 /[HPF]                       

    



[facility] leukocyte count by microscopy 
(number/high power field)2020 19:47:00





             Test Item    Value        Reference Range Interpretation Comments

 

             Urine WBC (test code = 5821-4) 0-5 /[HPF]                          

   



[facility] epithelial cell count by microscopy 
(number/high power field)2020 19:47:00





             Test Item    Value        Reference Range Interpretation Comments

 

             Urine Epithelial Cells (test None Seen /[HPF]                      

     



             code = 5787-7)                                        



[facility] sediment crystal count by microscopy (number/high 
power field)2020 19:47:00





             Test Item    Value        Reference Range Interpretation Comments

 

             Urine Crystals (test code = None Seen /[HPF]                       

    



             98507-8)                                            



[facility] sediment bacteria count by microscopy (number/high
 power field)2020 19:47:00





             Test Item    Value        Reference Range Interpretation Comments

 

             Urine Bacteria (test code = None Seen /[HPF]                       

    



             5769-5)                                             



[facility] sediment casts count by microscopy (number/low 
power field)2020 19:47:00





             Test Item    Value        Reference Range Interpretation Comments

 

             Urine Casts (test code = Present /[LPF]                           



             9842-6)                                             



[facility]. [facility] sediment hyaline cast count by microscopy 
(number/low power field)2020 19:47:00





             Test Item    Value        Reference Range Interpretation Comments

 

             Urine Hyaline Casts (test code = 2-5 /[LPF]                        

     



             5796-8)                                             



[facility] by [facility] microscopy
2020 19:47:00





             Test Item    Value        Reference Range Interpretation Comments

 

             Urine Yeast (test code = None Seen /[HPF]                          

 



             33115-6)                                            



[facility]  19:47[facility]





             Test Item    Value        Reference Range Interpretation Comments

 

             Urinalysis Comment (test code = 8262-8) *                          

            



[facility]  19:[facility]





             Test Item    Value        Reference Range Interpretation Comments

 

             Urine Culture Indicated (test code = Not Ind                       

         



             8264-4)                                             



[facility] [id number] 19:47:00





             Test Item    Value        Reference Range Interpretation Comments

 

             Urine Methamphetamines Screen Positive ng/mL                       

    



             (test code = 53915-0)                                        



[facility] propoxyphene screening [id number] 19:47:00





             Test Item    Value        Reference Range Interpretation Comments

 

             Urine Propoxyphene Screen Negative ng/mL                           



             (test code = 52315-5)                                        



[facility] amphetamines detection by screening method
2020 19:47:00





             Test Item    Value        Reference Range Interpretation Comments

 

             Urine Amphetamines Screen Positive ng/mL                           



             (test code = 36859-2)                                        



[facility] b[facility] with reflex confirmation
2020 19:47:00





             Test Item    Value        Reference Range Interpretation Comments

 

             Urine Buprenorphine (test code Negative ng/mL                      

     



             = 3414-0)                                           



[facility] barbiturates detection by screening method
2020 19:47:00





             Test Item    Value        Reference Range Interpretation Comments

 

             Urine Barbiturates Screen Negative ng/mL                           



             (test code = 27382-7)                                        



[facility] benzodiazepines detection by screening method
2020 19:47:00





             Test Item    Value        Reference Range Interpretation Comments

 

             Urine Benzodiazepines Screen Negative ng/mL                        

   



             (test code = 04245-2)                                        



[facility] benzoyle[facility]gonine detection by screening method
2020 19:47:00





             Test Item    Value        Reference Range Interpretation Comments

 

             Urine Cocaine Screen (test Negative ng/mL                          

 



             code = 64521-2)                                        



[facility]. ElizabethUrine methadone [id number] 19:47:00





             Test Item    Value        Reference Range Interpretation Comments

 

             Urine Methadone, Qualitative Negative ng/mL                        

   



             (test code = 19550-3)                                        



[facility]. ElizabethUrine opiates screening [id number] 19:47:00





             Test Item    Value        Reference Range Interpretation Comments

 

             Urine Opiates Screen (test Negative ng/mL                          

 



             code = 47932-6)                                        



[facility]. ElizabethUrine phencyclidine detection by screening method
2020 19:47:00





             Test Item    Value        Reference Range Interpretation Comments

 

             Urine Phencyclidine Screen Negative ng/mL                          

 



             (test code = 65690-6)                                        



[facility]. ElizabethUrine cannabinoids detection by screening method
2020 19:47:00





             Test Item    Value        Reference Range Interpretation Comments

 

             Urine Cannabinoids (test code Negative ng/mL                       

    



             = 90107-5)                                          



[facility]. ElizabethScreening urine tricyclic antidepressants detection
2020 19:47:00





             Test Item    Value        Reference Range Interpretation Comments

 

             Ur Tricyclic Antidepressants Negative ng/mL                        

   



             Screen (test code = 34760-8)                                       

 



[facility]. ElizabethUrine oxycodone detection by screening [id number] 
19:47:00





             Test Item    Value        Reference Range Interpretation Comments

 

             Urine Oxycodone Screen (test Negative ng/mL                        

   



             code = 16477-0)                                        



[facility]. ElizabethSpecific gravity of Urine by Automated test strip
2020 19:47:00





             Test Item    Value        Reference Range Interpretation Comments

 

             Urine Specific Gravity (test code = 1.014                          

        



             37292-2)                                            



[facility]. ElizabethUrine pH measurement by automated test [id number] 
19:47:00





             Test Item    Value        Reference Range Interpretation Comments

 

             Urine pH (test code = 80401-0) 5.5                                 

   



[facility] - . ElizabethUrine drug screen comment [id number] 
19:47:00





             Test Item    Value        Reference Range Interpretation Comments

 

             Urine Drug Screen Comment (test code See Note                      

         



             = 38001-7)                                          



[facility]. ElizabethUrine amphetamine measurement (mass/volume)2020 
19:47:00





             Test Item    Value        Reference Range Interpretation Comments

 

             Urine Amphetamine Level (test code 428 ng/mL                       

       



             = 58870-2)                                          



[facility] (mass/volume)
2020 19:47:00





             Test Item    Value        Reference Range Interpretation Comments

 

             Urine Methamphetamines Level (test 1412 ng/mL                      

       



             code = 3780-4)                                        



[facility] methylenedioxyamphetamine measurement 
(mass/volume)2020 19:47:00





             Test Item    Value        Reference Range Interpretation Comments

 

             Ur Methylenedioxyamphetamine (MDA) <200 ng/mL                      

       



             (test code = 53039-8)                                        



[facility]enedioxymethamphetamine [facility] 
(mass/volume)2020 19:47:00





             Test Item    Value        Reference Range Interpretation Comments

 

             Urine Methylenedioxymethamphetamine <200 ng/mL                     

        



             (test code = 35147-3)                                        



[facility] methylenedioxyethylamphetamine measurement 
(mass/volume)2020 19:47:00





             Test Item    Value        Reference Range Interpretation Comments

 

             Urine MDE-amphetamine (MDEA) (test <200 ng/mL                      

       



             code = 89917-2)                                        



[facility] phentermine measurement by confirmatory method 
(mass/volume)2020 19:47:00





             Test Item    Value        Reference Range Interpretation Comments

 

             Urine Phentermine (LC/MS/MS) (test <200 ng/mL                      

       



             code = 68435-0)                                        



[facility] drug screen comment [id number] 
19:47:00





             Test Item    Value        Reference Range Interpretation Comments

 

             Urine Drug Screen Comment (test code See Note                      

         



             = 89225-9)                                          



[facility] amp[facility] (mass/volume)2020 
19:47:00





             Test Item    Value        Reference Range Interpretation Comments

 

             Urine Amphetamine Level (test code 428 ng/mL                       

       



             = 05750-3)                                          



[facility] measurement (mass/volume)
2020 19:47:00





             Test Item    Value        Reference Range Interpretation Comments

 

             Urine Methamphetamines Level (test 1412 ng/mL                      

       



             code = 3780-4)                                        



[facility] methylenedioxyamphetamine measurement 
(mass/volume)2020 19:47:00





             Test Item    Value        Reference Range Interpretation Comments

 

             Ur Methylenedioxyamphetamine (MDA) <200 ng/mL                      

       



             (test code = 59885-1)                                        



[facility] TimothythUrine methylenedioxymethamphetamine measurement 
(mass/volume)2020 19:47:00





             Test Item    Value        Reference Range Interpretation Comments

 

             Urine Methylenedioxymethamphetamine <200 ng/mL                     

        



             (test code = 06583-5)                                        



[facility] Rose MariebethUrine methylenedioxyethylamphetamine measurement 
(mass/volume)2020 19:47:00





             Test Item    Value        Reference Range Interpretation Comments

 

             Urine MDE-amphetamine (MDEA) (test <200 ng/mL                      

       



             code = 72664-5)                                        



[facility] TimothyUrine phentermine measurement by confirmatory method 
(mass/volume)2020 19:47:00





             Test Item    Value        Reference Range Interpretation Comments

 

             Urine Phentermine (LC/MS/MS) (test <200 ng/mL                      

       



             code = 75015-0)                                        



[facility] LaurieBlood [id number] 18:02:22No growth at 5 days.
Magnesium [id number] 13:05:08





             Test Item    Value        Reference Range Interpretation Comments

 

             Magnesium Level (test code = 2.4 mg/dL    1.6-2.6                  

 



             Magnesium Level)                                        



Phosphorus [id number] 13:05:08





             Test Item    Value        Reference Range Interpretation Comments

 

             Phosphorus Level (test code = 8.2 mg/dL    2.5-4.9      H          

  



             Phosphorus Level)                                        



Clostridium difficile [id number] 11:57:01





             Test Item    Value        Reference Range Interpretation Comments

 

             CDIFF PCR (test code POSITIVE     Negative                  CTRB BR

AND MCZZAGHEA



             = CDIFF PCR)                                        RN3/ 11:

56:48



                                                                 CDT/PN



Comprehensive Metabolic [id number] 07:22:35





             Test Item    Value        Reference Range Interpretation Comments

 

             Sodium Level (test 133 mmol/L   136-145      L            



             code = Sodium Level)                                        

 

             Potassium Level (test 4.1 mmol/L   3.5-5.1                   



             code = Potassium                                        



             Level)                                              

 

             Chloride Level (test 82 mmol/L           L            



             code = Chloride                                        



             Level)                                              

 

             CO2 (test code = CO2) 35 mmol/L    21-32        H            

 

             Anion Gap (test code 16 mmol/L    7-16                      



             = Anion Gap)                                        

 

             BUN (test code = BUN) 125 mg/dL    7-18                      CTRB/S

. NOMAN RN



                                                                 3/29/2020 07:22

:25



                                                                 CDT/RSM

 

             Creatinine Level 5.3 mg/dL    0.7-1.3      H            



             (test code =                                        



             Creatinine Level)                                        

 

             Glucose Level (test 207 mg/dL           H            



             code = Glucose Level)                                        

 

             Calcium Level (test 6.1 mg/dL    8.5-10.1     L            



             code = Calcium Level)                                        

 

             Alk Phos (test code = 120 IntlUnit/L                     



             Alk Phos)                                           

 

             Bilirubin Total (test 0.4 mg/dL    0.2-1.0                   



             code = Bilirubin                                        



             Total)                                              

 

             Albumin Level (test 2.1 g/dL     3.4-5.0      L            



             code = Albumin Level)                                        

 

             Protein Total (test 5.3 g/dL     6.4-8.2      L            



             code = Protein Total)                                        

 

             ALT (test code = ALT) 43 IntlUnit/L 12-78                     

 

             AST (test code = AST) 106 IntlUnit/L 10-34        H            



Comprehensive Metabolic [id number] 07:22:35





             Test Item    Value        Reference Range Interpretation Comments

 

             Sodium Level (test 133 mmol/L   136-145      L            



             code = Sodium Level)                                        

 

             Potassium Level (test 4.1 mmol/L   3.5-5.1                   



             code = Potassium                                        



             Level)                                              

 

             Chloride Level (test 82 mmol/L           L            



             code = Chloride                                        



             Level)                                              

 

             CO2 (test code = CO2) 35 mmol/L    21-32        H            

 

             Anion Gap (test code 16 mmol/L    7-16                      



             = Anion Gap)                                        

 

             BUN (test code = BUN) 125 mg/dL    7-18                      CTRB/S

Lian TINEO RN



                                                                 3/29/2020 07:22

:25



                                                                 CDT/RSM

 

             Creatinine Level 5.3 mg/dL    0.7-1.3      H            



             (test code =                                        



             Creatinine Level)                                        

 

             Glucose Level (test 207 mg/dL           H            



             code = Glucose Level)                                        

 

             Calcium Level (test 6.1 mg/dL    8.5-10.1     L            



             code = Calcium Level)                                        

 

             Alk Phos (test code = 120 IntlUnit/L                     



             Alk Phos)                                           

 

             Bilirubin Total (test 0.4 mg/dL    0.2-1.0                   



             code = Bilirubin                                        



             Total)                                              

 

             Albumin Level (test 2.1 g/dL     3.4-5.0      L            



             code = Albumin Level)                                        

 

             Protein Total (test 5.3 g/dL     6.4-8.2      L            



             code = Protein Total)                                        

 

             ALT (test code = ALT) 43 IntlUnit/L 12-78                     

 

             AST (test code = AST) 106 IntlUnit/L 10-34        H            

 

             eGFR AA (test code = 14 mL/min/1.73              N            



             eGFR AA)     m2                                     



Comprehensive Metabolic [id number] 07:22:35





             Test Item    Value        Reference Range Interpretation Comments

 

             Sodium Level (test 133 mmol/L   136-145      L            



             code = Sodium Level)                                        

 

             Potassium Level (test 4.1 mmol/L   3.5-5.1                   



             code = Potassium                                        



             Level)                                              

 

             Chloride Level (test 82 mmol/L           L            



             code = Chloride                                        



             Level)                                              

 

             CO2 (test code = CO2) 35 mmol/L    21-32        H            

 

             Anion Gap (test code 16 mmol/L    7-16                      



             = Anion Gap)                                        

 

             BUN (test code = BUN) 125 mg/dL    7-18                      [facility]/SHUKRI TINEO RN



                                                                 3/29/2020 07:22

:25



                                                                 CDT/RSM

 

             Creatinine Level 5.3 mg/dL    0.7-1.3      H            



             (test code =                                        



             Creatinine Level)                                        

 

             Glucose Level (test 207 mg/dL           H            



             code = Glucose Level)                                        

 

             Calcium Level (test 6.1 mg/dL    8.5-10.1     L            



             code = Calcium Level)                                        

 

             Alk Phos (test code = 120 IntlUnit/L                     



             Alk Phos)                                           

 

             Bilirubin Total (test 0.4 mg/dL    0.2-1.0                   



             code = Bilirubin                                        



             Total)                                              

 

             Albumin Level (test 2.1 g/dL     3.4-5.0      L            



             code = Albumin Level)                                        

 

             Protein Total (test 5.3 g/dL     6.4-8.2      L            



             code = Protein Total)                                        

 

             ALT (test code = ALT) 43 IntlUnit/L 12-78                     

 

             AST (test code = AST) 106 IntlUnit/L 10-34        H            

 

             eGFR AA (test code = 14 mL/min/1.73              N            



             eGFR AA)     m2                                     

 

             eGFR Non-AA (test 12 mL/min/1.73              N            



             code = eGFR Non-AA) m2                                     



Manual [id number] 07:12:36





             Test Item    Value        Reference Range Interpretation Comments

 

             Segs Man (test code = Segs Man) 67 %                      N        

    

 

             Band Man (test code = Band Man) 13 %                      N        

    

 

             Lymph Man (test code = Lymph 15 %                      N           

 



             Man)                                                

 

             Monocyte Man (test code = 5 %                       N            



             Monocyte Man)                                        

 

             Eos Man (test code = Eos Man) 0 %                       N          

  

 

             Basophil Man (test code = 0 %                       N            



             Basophil Man)                                        

 

             Neut Man Abs (test code = Neut 4.6          2.7-7.3                

   



             Man Abs)                                            

 

             Lymph Man Abs (test code = Lymph 0.9          0.8-3.5              

     



             Man Abs)                                            

 

             Mono Man Abs (test code = Mono 0.3          0.3-0.9                

   



             Man Abs)                                            

 

             Eos Man Abs (test code = Eos Man 0.0          0.0-0.3              

     



             Abs)                                                

 

             Baso Man Abs (test code = Baso 0.0          0.0-0.1                

   



             Man Abs)                                            

 

             NRBC Man (test code = NRBC Man) 2 /100(WBCs)              N        

    

 

             RBC Morph (test code = RBC As Indicated Normal       A            



             Morph)                                              

 

             Hypochromia (test code = 1+                        A            



             Hypochromia)                                        

 

             Anisocyte (test code = 1+                        A            



             Anisocyte)                                          

 

             Plt Estimation (test code = Plt Normal       Normal                

    



             Estimation)                                         



Complete Blood Count with [id number] 06:45:38





             Test Item    Value        Reference Range Interpretation Comments

 

             WBC (test code = WBC) 5.8 x10      4.8-10.8                  

 

             RBC (test code = RBC) 3.46 x10     4.60-6.20    L            

 

             Hgb (test code = Hgb) 11.4 g/dL    14.0-18.0    L            

 

             Hct (test code = Hct) 31.4 %       38.0-52.0    L            

 

             MCV (test code = MCV) 90.6 fL      80.0-95.0                 

 

             MCHC (test code = MCHC) 36.4 g/dL    31.0-36.0    H            

 

             RDW (test code = RDW) 14.0 %       11.5-14.5                 

 

             MCH (test code = MCH) 32.9 pg      26.0-32.0    H            

 

             Platelets (test code = Platelets) 194 x10      140-440             

      

 

             MPV (test code = MPV) 9.3 fL       7.5-11.2                  

 

             Slide Review (test code = Slide Manual                             

    



             Review)                                             



POC [id number] 06:21:36





             Test Item    Value        Reference Range Interpretation Comments

 

             Glucose POC (test 197 mg/dL           H            POC Glucos

e used on



             code = Glucose POC)                                        critical

ly ill patients



                                                                 is considered "

off-label



                                                                 use" and has no

t been



                                                                 cleared or appr

davian by



                                                                 the FDA. Altern

ative



                                                                 testing methods

 should



                                                                 be considered i

f the



                                                                 patient is crit

ically



                                                                 ill.



POC [id number] 06:21:35





             Test Item    Value        Reference Range Interpretation Comments

 

             Glucose POC (test 225 mg/dL           H            POC Glucos

e used on



             code = Glucose POC)                                        critical

ly ill patients



                                                                 is considered "

off-label



                                                                 use" and has no

t been



                                                                 cleared or appr

davian by



                                                                 the FDA. Altern

ative



                                                                 testing methods

 should



                                                                 be considered i

f the



                                                                 patient is crit

ically



                                                                 ill.



POC [id number] 04:25:05





             Test Item    Value        Reference Range Interpretation Comments

 

             Glucose POC (test 190 mg/dL           H            POC Glucos

e used on



             code = Glucose POC)                                        critical

ly ill patients



                                                                 is considered "

off-label



                                                                 use" and has no

t been



                                                                 cleared or appr

davian by



                                                                 the FDA. Altern

ative



                                                                 testing methods

 should



                                                                 be considered i

f the



                                                                 patient is crit

ically



                                                                 ill.



POC [id number] 02:40:25





             Test Item    Value        Reference Range Interpretation Comments

 

             Glucose POC (test 279 mg/dL           H            POC Glucos

e used on



             code = Glucose POC)                                        critical

ly ill patients



                                                                 is considered "

off-label



                                                                 use" and has no

t been



                                                                 cleared or appr

davian by



                                                                 the FDA. Altern

ative



                                                                 testing methods

 should



                                                                 be considered i

f the



                                                                 patient is crit

ically



                                                                 ill.



POC [id number] 01:47:13





             Test Item    Value        Reference Range Interpretation Comments

 

             Glucose POC (test 384 mg/dL           H            POC Glucos

e used on



             code = Glucose POC)                                        critical

ly ill patients



                                                                 is considered "

off-label



                                                                 use" and has no

t been



                                                                 cleared or appr

davian by



                                                                 the FDA. Altern

ative



                                                                 testing methods

 should



                                                                 be considered i

f the



                                                                 patient is crit

ically



                                                                 ill.



Basic Metabolic [id number] 01:47:00





             Test Item    Value        Reference Range Interpretation Comments

 

             Sodium Level (test 131 mmol/L   136-145      L            



             code = Sodium Level)                                        

 

             Potassium Level (test 5.2 mmol/L   3.5-5.1      H            



             code = Potassium                                        



             Level)                                              

 

             Chloride Level (test 80 mmol/L           L            



             code = Chloride                                        



             Level)                                              

 

             CO2 (test code = CO2) 33 mmol/L    21-32        H            

 

             Anion Gap (test code 18 mmol/L    7-16         H            



             = Anion Gap)                                        

 

             BUN (test code = BUN) 123 mg/dL    7-18                      ctrb/ 

d bellettiere /



                                                                 rn / li 3/29/20

20



                                                                 01:46:56 CDT

 

             Creatinine Level 5.6 mg/dL    0.7-1.3      H            



             (test code =                                        



             Creatinine Level)                                        

 

             Glucose Level (test 337 mg/dL           H            



             code = Glucose Level)                                        

 

             Calcium Level (test 6.7 mg/dL    8.5-10.1     L            



             code = Calcium Level)                                        



Basic Metabolic [id number] 01:47:00





             Test Item    Value        Reference Range Interpretation Comments

 

             Sodium Level (test 131 mmol/L   136-145      L            



             code = Sodium Level)                                        

 

             Potassium Level 5.2 mmol/L   3.5-5.1      H            



             (test code =                                        



             Potassium Level)                                        

 

             Chloride Level (test 80 mmol/L           L            



             code = Chloride                                        



             Level)                                              

 

             CO2 (test code = 33 mmol/L    21-32        H            



             CO2)                                                

 

             Anion Gap (test code 18 mmol/L    7-16         H            



             = Anion Gap)                                        

 

             BUN (test code = 123 mg/dL    7-18                      ctrb/ d bel

lettiere



             BUN)                                                / rn / li 3/29/

2020



                                                                 01:46:56 CDT

 

             Creatinine Level 5.6 mg/dL    0.7-1.3      H            



             (test code =                                        



             Creatinine Level)                                        

 

             Glucose Level (test 337 mg/dL           H            



             code = Glucose                                        



             Level)                                              

 

             Calcium Level (test 6.7 mg/dL    8.5-10.1     L            



             code = Calcium                                        



             Level)                                              

 

             eGFR AA (test code = 13 mL/min/1.73              N            



             eGFR AA)     m2                                     



Basic Metabolic [id number] 01:47:00





             Test Item    Value        Reference Range Interpretation Comments

 

             Sodium Level (test 131 mmol/L   136-145      L            



             code = Sodium Level)                                        

 

             Potassium Level 5.2 mmol/L   3.5-5.1      H            



             (test code =                                        



             Potassium Level)                                        

 

             Chloride Level (test 80 mmol/L           L            



             code = Chloride                                        



             Level)                                              

 

             CO2 (test code = 33 mmol/L    21-32        H            



             CO2)                                                

 

             Anion Gap (test code 18 mmol/L    7-16         H            



             = Anion Gap)                                        

 

             BUN (test code = 123 mg/dL    7-18                      ctrb/ d bel

lettiere



             BUN)                                                / rn / li 3/29/

2020



                                                                 01:46:56 CDT

 

             Creatinine Level 5.6 mg/dL    0.7-1.3      H            



             (test code =                                        



             Creatinine Level)                                        

 

             Glucose Level (test 337 mg/dL           H            



             code = Glucose                                        



             Level)                                              

 

             Calcium Level (test 6.7 mg/dL    8.5-10.1     L            



             code = Calcium                                        



             Level)                                              

 

             eGFR AA (test code = 13 mL/min/1.73              N            



             eGFR AA)     m2                                     

 

             eGFR Non-AA (test 11 mL/min/1.73              N            



             code = eGFR Non-AA) m2                                     



[id number] 01:39:52





             Test Item    Value        Reference Range Interpretation Comments

 

             Acetone (Ketones) (test code = Negative     Negative               

   



             Acetone (Ketones))                                        



POC [id number] 00:50:55





             Test Item    Value        Reference Range Interpretation Comments

 

             Glucose POC (test 423 mg/dL                        Ordered La

b DrawPOC



             code = Glucose POC)                                        Glucose 

used on



                                                                 critically ill 

patients



                                                                 is considered "

off-label



                                                                 use" and has no

t been



                                                                 cleared or appr

davian by



                                                                 the FDA. Altern

ative



                                                                 testing methods

 should



                                                                 be considered i

f the



                                                                 patient is crit

ically



                                                                 ill.



POC [id number] 22:52:44





             Test Item    Value        Reference Range Interpretation Comments

 

             Glucose POC (test 574 mg/dL                        MD Feliberto Akins Glucose



             code = Glucose POC)                                        used on 

critically ill



                                                                 patients is con

sidered



                                                                 "off-label use"

 and has



                                                                 not been cleare

d or



                                                                 approved by the

 FDA.



                                                                 Alternative guillermina

ting



                                                                 methods should 

be



                                                                 considered if t

he



                                                                 patient is crit

ically



                                                                 ill.



Glucose [id number] 21:22:30





             Test Item    Value        Reference Range Interpretation Comments

 

             Glucose Level (test 593 mg/dL                        ctrb/ lisa johnson / rn /



             code = Glucose Level)                                         3/2

2020 21:22:26



                                                                 CDT



Basic Metabolic [id number] 20:42:16





             Test Item    Value        Reference Range Interpretation Comments

 

             Sodium Level (test code 125 mmol/L   136-145      L            



             = Sodium Level)                                        

 

             Potassium Level (test 5.0 mmol/L   3.5-5.1                   



             code = Potassium Level)                                        

 

             Chloride Level (test 70 mmol/L                        davey barnes

 3/28/2020



             code = Chloride Level)                                        20:41

:54 CDT pbh

 

             CO2 (test code = CO2) 36 mmol/L    21-32        H            

 

             Anion Gap (test code = 19 mmol/L    7-16         H            



             Anion Gap)                                          

 

             BUN (test code = BUN) 121 mg/dL    7-18                      davey bell 3/28/2020



                                                                 20:41:08 CDT pb

h

 

             Creatinine Level (test 5.7 mg/dL    0.7-1.3      H            



             code = Creatinine                                        



             Level)                                              

 

             Glucose Level (test 625 mg/dL                        ctrb gutierrez

t rn



             code = Glucose Level)                                        3/28/2

020 20:40:51



                                                                 CDT pbh

 

             Calcium Level (test 6.3 mg/dL    8.5-10.1     L            



             code = Calcium Level)                                        



Basic Metabolic [id number] 20:42:16





             Test Item    Value        Reference Range Interpretation Comments

 

             Sodium Level (test 125 mmol/L   136-145      L            



             code = Sodium Level)                                        

 

             Potassium Level (test 5.0 mmol/L   3.5-5.1                   



             code = Potassium                                        



             Level)                                              

 

             Chloride Level (test 70 mmol/L                        mariscal rn

 3/28/2020



             code = Chloride                                        20:41:54 CDT

 pbh



             Level)                                              

 

             CO2 (test code = CO2) 36 mmol/L    21-32        H            

 

             Anion Gap (test code 19 mmol/L    7-16         H            



             = Anion Gap)                                        

 

             BUN (test code = BUN) 121 mg/dL    7-18                      mariscal r

n 3/28/2020



                                                                 20:41:08 CDT pb

h

 

             Creatinine Level 5.7 mg/dL    0.7-1.3      H            



             (test code =                                        



             Creatinine Level)                                        

 

             Glucose Level (test 625 mg/dL                        ctrb gutierrez

t rn



             code = Glucose Level)                                        3/28/2

020 20:40:51



                                                                 CDT pbh

 

             Calcium Level (test 6.3 mg/dL    8.5-10.1     L            



             code = Calcium Level)                                        

 

             eGFR AA (test code = 13 mL/min/1.73              N            



             eGFR AA)     m2                                     

 

             eGFR Non-AA (test 11 mL/min/1.73              N            



             code = eGFR Non-AA) m2                                     



Basic Metabolic [id number] 20:42:16





             Test Item    Value        Reference Range Interpretation Comments

 

             Sodium Level (test 125 mmol/L   136-145      L            



             code = Sodium Level)                                        

 

             Potassium Level (test 5.0 mmol/L   3.5-5.1                   



             code = Potassium                                        



             Level)                                              

 

             Chloride Level (test 70 mmol/L                        mariscal rn

 3/28/2020



             code = Chloride                                        20:41:54 CDT

 pbh



             Level)                                              

 

             CO2 (test code = CO2) 36 mmol/L    21-32        H            

 

             Anion Gap (test code 19 mmol/L    7-16         H            



             = Anion Gap)                                        

 

             BUN (test code = BUN) 121 mg/dL    7-18                      mariscal r

n 3/28/2020



                                                                 20:41:08 CDT pb

h

 

             Creatinine Level 5.7 mg/dL    0.7-1.3      H            



             (test code =                                        



             Creatinine Level)                                        

 

             Glucose Level (test 625 mg/dL                        ctrb gutierrez

araceli rn



             code = Glucose Level)                                        3/28/2

020 20:40:51



                                                                 CDT pbh

 

             Calcium Level (test 6.3 mg/dL    8.5-10.1     L            



             code = Calcium Level)                                        

 

             eGFR AA (test code = 13 mL/min/1.73              N            



             eGFR AA)     m2                                     

 

             eGFR Non-AA (test 11 mL/min/1.73              N            



             code = eGFR Non-AA) m2                                     



Glucose [id number] 20:21:04





             Test Item    Value        Reference Range Interpretation Comments

 

             Glucose Level (test 650 mg/dL                        ctrb 3/2

2020



             code = Glucose Level)                                        20:20:

49 CDT  mita welch



                                                                 rn pb



Troponin -17-52 18:21:22





             Test Item    Value        Reference Range Interpretation Comments

 

             Troponin-I (test 18.600 ng/mL 0.010-0.040               ctrb 3/28/2

020



             code = Troponin-I)                                        18:20:24 

CDT Noris rn



                                                                 pbh



Glucose [id number] 17:36:22





             Test Item    Value        Reference Range Interpretation Comments

 

             Glucose Level (test 811 mg/dL                        ctrb C F

ord 3/28/2020



             code = Glucose Level)                                        17:35:

31 CDT pbh



Lactic Acid, Plasma (Venous)2020 17:34:28





             Test Item    Value        Reference Range Interpretation Comments

 

             Lactic Acid, Plasma 4.1 mmol/L   0.4-2.0                   ctrb C F

ord 3/28/2020



             (Venous) (test code =                                        17:34:

08 CDT pbh



             Lactic Acid, Plasma                                        



             (Venous))                                           



Basic Metabolic [id number] 17:01:48





             Test Item    Value        Reference Range Interpretation Comments

 

             Sodium Level (test 123 mmol/L   136-145      L            



             code = Sodium Level)                                        

 

             Potassium Level (test 4.5 mmol/L   3.5-5.1                   



             code = Potassium                                        



             Level)                                              

 

             Chloride Level (test 66 mmol/L                        ctrb Ni

veronica Givens



             code = Chloride                                        RN 3/28/2020



             Level)                                              16:57:54 CDT PB

H

 

             CO2 (test code = CO2) 34 mmol/L    21-32        H            

 

             Anion Gap (test code 23 mmol/L    7-16         H            



             = Anion Gap)                                        

 

             BUN (test code = BUN) 119 mg/dL    7-18                      ctrb n

jordon givens



                                                                 3/28/2020 17:01

:25



                                                                 CDT pbh

 

             Creatinine Level 5.4 mg/dL    0.7-1.3      H            



             (test code =                                        



             Creatinine Level)                                        

 

             Glucose Level (test 913 mg/dL                        CTRB MARLENE BURK



             code = Glucose Level)                                        RN3/28

/2020



                                                                 16:32:09 CDT/PN

 

             Calcium Level (test 6.0 mg/dL    8.5-10.1     L            



             code = Calcium Level)                                        

 

             eGFR AA (test code = 14 mL/min/1.73              N            



             eGFR AA)     m2                                     

 

             eGFR Non-AA (test 11 mL/min/1.73              N            



             code = eGFR Non-AA) m2                                     



CT Abdomen and Pelvis w/o [id number] 16:27:27Patient:   DAGOBERTO FERMIN    
                                 MRN:    [id number] Date/Time3/ 16:06
 CDTReason for ExamAbdominal painReportCT ABDOMEN AND CT PELVIS WITHOUT 
CONTRAST: MULTIPLANAR REFORMATSREASON FOR STUDY: Hepatitis C. Diabetes. 
Hypertension. Tobacco use. Pancreatitis.Radiation dose lowering techniques were 
used with automated exposure control, adjusting the mA according to patient's 
size.COMPARISON: 2017.Multiple transverse images were obtained 
through the upper abdomen and the pelvis. Images were reconstructed in coronal 
and in sagittal planes. No intravenous ororal contrast material was 
administered, as ordered. Infiltrates are present within the medial aspect of 
the left lower lobe. Lungs are mildly hyperexpanded suspect for degree of COPD. 
Hiatal hernia isnoted. Reflux of fluid is seen into the distal esophagus. The 
right lobe the liver within the mid axillary line measures 20 cm. Spleen 
measures 6.1 cm in length. No calcified gallstones are identified.Numerous 
calcifications are seen within the body and head of the pancreas consistent with
 chronic pancreatitis. There is poor separation of soft tissue planes without 
the use of IV or oral contrast. There is also a paucity of intra-abdominal fat. 
The pancreas is poorly  from unopacified loopsof small bowel. There is 
no hydronephrosis. A tiny 1 mm calculus is seen within superior pole of theleft 
kidney. There is also an additional 1 to 2 mm calculus within the inferior pole 
of the right kidney. Abdominal aorta is normal in caliber. No focal adrenal 
nodules are identified. Images were extended inferiorly below the pubic 
symphysis. Amaro catheter balloon is seen within the urinary bladder.No focal 
masses or adenopathy is identified however examination quality is compromised 
without the use of oral contrast. Patient has abnormal renal functions and no IV
 contrast could be utilized. A degenerative vacuum disc with disc space 
narrowing is seen at the L5-S1 level with broad-based disc protrusion at L5-S1. 
Spondyloarthritic changes are seen throughout the lumbar spine and partially 
visualized lower dorsal spine.IMPRESSION:1. Infiltrates within the medial aspect
 of the left lower lobe suspect for pneumonia. COPD.2. Hiatal hernia with reflux
 of fluid into the distal esophagus.3. Hepatomegaly with liver measuring 20 cm 
in length.4. Chronic pancreatitis.5. Small bilateral intrarenal calculi.Exam 
quality is compromised and limited. Patient has abnormal renal functions and no 
IV contrast canbe utilized. No oral contrast was administered as per order. 
There is poor separation of the pancreas from adjacent nonopacified bowel loops 
without oral opacification and also secondary to paucity of intra-abdominal 
fat.***** Final *****Dictated by:    MD Paris Arthur LDictated DT/TM: 
2020 4:19 pmSigned by:  MD Paris Arthur LSigned (Electronic Signature): 
 2020 4:27 pmCT Brain/Head w/o [id number] 16:18:50Patient:   
DAGOBERTO FERMIN                                     MRN:    [id number] 
Date/Time3/ 16:06 CDTReason for ExamConfusionReportCT BRAIN WITHOUT 
CONTRAST: MULTIPLANAR REFORMATSCOMPARISON: NoneCLINICAL INFORMATION: Confusion. 
Altered mental status. Hepatitis C. Hypertension. Tobacco use.Radiation dose 
lowering techniques were used according to ALARA principal.Serial axial images 
were madewithout IV contrast media.  Images are reconstructed in coronal and in 
sagittal planes. No mass lesion is detected.  Ventricular system is in the 
midline.  There is no evidence of effacement of the ventricular system.  No 
areas of abnormal attenuation are detected.  Bony structures appear normal where
adequately visualized. The visualized paranasal sinuses are clear. There is 
symmetric pneumatizationof mastoid air cells.IMPRESSION:1. No acute intracranial
 abnormality.***** Final *****Dictated by: MD Paris Arthur LDictated DT/TM: 
2020 4:17 pmSigned by:  MD Paris Arthur LSigned (Electronic Signature): 
 2020 4:18 pmTroponin -44-14 16:16:30





             Test Item    Value        Reference Range Interpretation Comments

 

             Troponin-I (test 10.900 ng/mL 0.010-0.040               CTRB DAIANA HUIZAR



             code = Troponin-I)                                        RN3/28/20

20 16:16:24



                                                                 CDT/PN



[id number] 16:14:58





             Test Item    Value        Reference Range Interpretation Comments

 

             Acetone (Ketones) (test code = Positive     Negative     A         

   



             Acetone (Ketones))                                        



Glucose [id number] 15:43:21





             Test Item    Value        Reference Range Interpretation Comments

 

             Glucose Level (test 913 mg/dL                        CT[facility] MARLENE BURK



             code = Glucose Level)                                        RN3/28

/2020 15:43:14



                                                                 CDT/PN



Phosphorus [id number] 13:57:47





             Test Item    Value        Reference Range Interpretation Comments

 

             Phosphorus Level (test 12.8 mg/dL   2.5-4.9                   ctrb 

MITA Gonzalez RN



             code = Phosphorus                                        3/28/2020 

13:57:40



             Level)                                              CDT



Magnesium [id number] 13:51:13





             Test Item    Value        Reference Range Interpretation Comments

 

             Magnesium Level (test code = 3.2 mg/dL    1.6-2.6      H           

 



             Magnesium Level)                                        



XR Chest 1 View [id number] 13:42:48Patient:   DAGOBERTO FERMIN               
                      MRN:    [id number] Date/Time3/ 13:19 CDTReason 
for Exampost CVC [;acement;Other (please specify)ReportPORTABLE AP CHEST: 1319 
HOURS.CLINICAL HISTORY: Central line placement.COMPARISON: 2020 
examination timed at 1120 hours.Since the previous examination there has been 
the insertion of a right IJ catheter with catheter tip at the junction of 
superior vena cava and right atrium. There is no pneumothorax. Lung fields are 
clear of focal infiltrates and there are no pleural effusions. There is 
bibasilar atelectasis. Cardiomediastinal silhouette and pulmonary vasculature 
are normal. Arthritic changes are seen within both shoulders.IMPRESSION:1. 
Interval placement of a right IJ catheter with catheter tip within the expected 
location of the junction of superior vena cava and right atrium.***** Final 
*****Dictated by:    MD Paris Arthur LDictated DT/TM: 2020 1:40 pmSigned
 by:  MD Paris Arthur LSigned (Electronic Signature):  2020 1:42 pm
Lactic Acid, Plasma (Venous)2020 12:53:08





             Test Item    Value        Reference Range Interpretation Comments

 

             Lactic Acid, Plasma 5.7 mmol/L   0.4-2.0                   AVELINO/NIKHIL BURK RN



             (Venous) (test code =                                        3/28/2

020 12:52:52



             Lactic Acid, Plasma                                        CDT/RSM



             (Venous))                                           



Comprehensive Metabolic [id number] 12:28:51





             Test Item    Value        Reference Range Interpretation Comments

 

             Sodium Level (test 118 mmol/L   136-145                   AVELINO/NIKHIL CANCINO RN



             code = Sodium Level)                                        3/28/20

20 12:28:39



                                                                 CDT/RSM

 

             Potassium Level (test 5.0 mmol/L   3.5-5.1                   



             code = Potassium                                        



             Level)                                              

 

             Chloride Level (test 54 mmol/L                        AVELINO/NIKHIL BURK RN



             code = Chloride                                        3/28/2020 12

:28:39



             Level)                                              CDT/RSM

 

             CO2 (test code = CO2) 32 mmol/L    21-32                     

 

             Anion Gap (test code 32 mmol/L    7-16         H            



             = Anion Gap)                                        

 

             BUN (test code = BUN) 119 mg/dL    7-18                      AVELINO/NAOMI BURK RN



                                                                 3/28/2020 12:28

:39



                                                                 CDT/RSM

 

             Creatinine Level 5.9 mg/dL    0.7-1.3      H            



             (test code =                                        



             Creatinine Level)                                        

 

             Glucose Level (test 1085 mg/dL                       AVELINO/NIKHIL BURK RN



             code = Glucose Level)                                        3/28/2

020 12:28:39



                                                                 CDT/RSM

 

             Calcium Level (test 6.9 mg/dL    8.5-10.1     L            



             code = Calcium Level)                                        

 

             Alk Phos (test code = 179 IntlUnit/L        H            



             Alk Phos)                                           

 

             Bilirubin Total (test 0.6 mg/dL    0.2-1.0                   



             code = Bilirubin                                        



             Total)                                              

 

             Albumin Level (test 2.7 g/dL     3.4-5.0      L            



             code = Albumin Level)                                        

 

             Protein Total (test 6.2 g/dL     6.4-8.2      L            



             code = Protein Total)                                        

 

             ALT (test code = ALT) 51 IntlUnit/L 12-78                     

 

             AST (test code = AST) 81 IntlUnit/L 10-34        H            



Troponin -28-17 12:28:51





             Test Item    Value        Reference Range Interpretation Comments

 

             Troponin-I (test 5.490 ng/mL  0.010-0.040               AVELINO/NIKHIL AMOR RN



             code = Troponin-I)                                        3/28/2020

 12:28:39



                                                                 CDT/RSM



Comprehensive Metabolic [id number] 12:28:51





             Test Item    Value        Reference Range Interpretation Comments

 

             Sodium Level (test 118 mmol/L   136-145                   AVELINO/NIKHIL CANCINO RN



             code = Sodium Level)                                        3/28/20

20 12:28:39



                                                                 CDT/RSM

 

             Potassium Level (test 5.0 mmol/L   3.5-5.1                   



             code = Potassium                                        



             Level)                                              

 

             Chloride Level (test 54 mmol/L                        AVELINO/NIKHIL BURK RN



             code = Chloride                                        3/28/2020 12

:28:39



             Level)                                              CDT/RSM

 

             CO2 (test code = CO2) 32 mmol/L    21-32                     

 

             Anion Gap (test code 32 mmol/L    7-16         H            



             = Anion Gap)                                        

 

             BUN (test code = BUN) 119 mg/dL    7-18                      AVELINO/NAOMI BURK RN



                                                                 3/28/2020 12:28

:39



                                                                 CDT/RSM

 

             Creatinine Level 5.9 mg/dL    0.7-1.3      H            



             (test code =                                        



             Creatinine Level)                                        

 

             Glucose Level (test 1085 mg/dL                       AVELINO/NIKHIL BURK RN



             code = Glucose Level)                                        3/28/2

020 12:28:39



                                                                 CDT/RSM

 

             Calcium Level (test 6.9 mg/dL    8.5-10.1     L            



             code = Calcium Level)                                        

 

             Alk Phos (test code = 179 IntlUnit/L        H            



             Alk Phos)                                           

 

             Bilirubin Total (test 0.6 mg/dL    0.2-1.0                   



             code = Bilirubin                                        



             Total)                                              

 

             Albumin Level (test 2.7 g/dL     3.4-5.0      L            



             code = Albumin Level)                                        

 

             Protein Total (test 6.2 g/dL     6.4-8.2      L            



             code = Protein Total)                                        

 

             ALT (test code = ALT) 51 IntlUnit/L 12-78                     

 

             AST (test code = AST) 81 IntlUnit/L 10-34        H            

 

             eGFR AA (test code = 12 mL/min/1.73              N            



             eGFR AA)     m2                                     



Comprehensive Metabolic [id number] 12:28:51





             Test Item    Value        Reference Range Interpretation Comments

 

             Sodium Level (test 118 mmol/L   136-145                   CTRB/NIKHIL CANCINO RN



             code = Sodium Level)                                        3/28/20

20 12:28:39



                                                                 CDT/RSM

 

             Potassium Level (test 5.0 mmol/L   3.5-5.1                   



             code = Potassium                                        



             Level)                                              

 

             Chloride Level (test 54 mmol/L                        AVELINO/NIKHIL BURK RN



             code = Chloride                                        3/28/2020 12

:28:39



             Level)                                              CDT/RSM

 

             CO2 (test code = CO2) 32 mmol/L    21-32                     

 

             Anion Gap (test code 32 mmol/L    7-16         H            



             = Anion Gap)                                        

 

             BUN (test code = BUN) 119 mg/dL    7-18                      AVELINO/NAOMI BURK RN



                                                                 3/28/2020 12:28

:39



                                                                 CDT/RSM

 

             Creatinine Level 5.9 mg/dL    0.7-1.3      H            



             (test code =                                        



             Creatinine Level)                                        

 

             Glucose Level (test 1085 mg/dL                       AVELINO/NIKHIL BURK RN



             code = Glucose Level)                                        3/28/2

020 12:28:39



                                                                 CDT/RSM

 

             Calcium Level (test 6.9 mg/dL    8.5-10.1     L            



             code = Calcium Level)                                        

 

             Alk Phos (test code = 179 IntlUnit/L        H            



             Alk Phos)                                           

 

             Bilirubin Total (test 0.6 mg/dL    0.2-1.0                   



             code = Bilirubin                                        



             Total)                                              

 

             Albumin Level (test 2.7 g/dL     3.4-5.0      L            



             code = Albumin Level)                                        

 

             Protein Total (test 6.2 g/dL     6.4-8.2      L            



             code = Protein Total)                                        

 

             ALT (test code = ALT) 51 IntlUnit/L 12-78                     

 

             AST (test code = AST) 81 IntlUnit/L 10-34        H            

 

             eGFR AA (test code = 12 mL/min/1.73              N            



             eGFR AA)     m2                                     

 

             eGFR Non-AA (test 10 mL/min/1.73              N            



             code = eGFR Non-AA) m2                                     



Creatine [id number] 12:23:18





             Test Item    Value        Reference Range Interpretation Comments

 

             CK (test code = CK) 1231 IntlUnit/L        H            



Creatine Kinase MB [id number] 12:23:18





             Test Item    Value        Reference Range Interpretation Comments

 

             CKMB (test code = CKMB) 51.0 ng/mL   0.5-3.6      H            

 

             CKMB % (test code = CKMB %) 4.1 %        0.0-5.0                   



[id number] 12:07:05





             Test Item    Value        Reference Range Interpretation Comments

 

             Acetone (Ketones) (test code = Positive     Negative     A         

   



             Acetone (Ketones))                                        



Drugs of Abuse Screen [id number] 12:03:06





             Test Item    Value        Reference Range Interpretation Comments

 

             Amphetamine Screen Ur Positive     Negative     A            



             (test code =                                        



             Amphetamine Screen Ur)                                        

 

             Barbiturate Screen Ur Negative     Negative                  



             (test code =                                        



             Barbiturate Screen Ur)                                        

 

             Benzodiazepines Ur Negative     Negative                  



             (test code =                                        



             Benzodiazepines Ur)                                        

 

             Cocaine Screen Ur Negative     Negative                  



             (test code = Cocaine                                        



             Screen Ur)                                          

 

             Opiate Screen Ur (test Negative     Negative                  



             code = Opiate Screen                                        



             Ur)                                                 

 

             U PCP Scrn (test code Negative                  N            



             = U PCP Scrn)                                        

 

             Cannabinoid Screen Ur Negative     Negative                  This a

ssay provides a



             (test code =                                        preliminary katelin

lytical



             Cannabinoid Screen Ur)                                        test 

result intended for



                                                                 medical purpose

s only.



                                                                 Confirmation wi

ll be



                                                                 sent to a [facility]

[facility]



                                                                 only upon addit

ional



                                                                 physician reque

st.The



                                                                 cutoff levels u

sed for



                                                                 this assay are 

as



                                                                 followsAmphetam

ine



                                                                 



                                                                 1000 ng/mlBarbi

tuates



                                                                      300



                                                                 ng/mlBenzodiaze

pine



                                                                                

      300



                                                                 ng.ml Cocaine



                                                                 



                                                                   300 ng/mlOpia

guillermina



                                                                 



                                                                         300 ng/

mlPCP



                                                                 



                                                                                

  25



                                                                 ng/mlTHC



                                                                 



                                                                     50 ng/ml

 

             pH Ur (test code = pH 5.0          5.0-9.0                   



             Ur)                                                 



Alcohol [id number] 12:02:17





             Test Item    Value        Reference Range Interpretation Comments

 

             Ethanol Level (test code = Ethanol 4 mg/dL      0-13               

       



             Level)                                              



Ammonia [id number] 11:58:20





             Test Item    Value        Reference Range Interpretation Comments

 

             Ammonia Level (test code = Ammonia <5 mmol/L    19-54        L     

       



             Level)                                              



XR Chest 1 View [id number] 11:44:17Patient:   ZANDER, DAGOBERTO               
                      MRN:    [id number] Date/Time3/ 11:28 CDTReason 
for Examams, septic workup;Other (please specify)ReportCHEST SINGLE VIEW: 1120 
hours.COMPARISON: 2017.CLINICAL INFORMATION: Diabetes. Hepatitis C.
 Hypertension. Tobacco use.Cardiomediastinal structures are within normal 
limits.  No pleural fluid or pulmonary infiltrates are identified.  The bony 
architecture appears intact, where adequately seen.IMPRESSION:  No active car
diopulmonary process is identified.***** Final *****Dictated by:    MD Paris Arthur LDictated DT/TM: 2020 11:43 amSigned by:  MD Paris Arthur LSigned
 (Electronic Signature):  2020 11:01wgIqjuizvnol7191-84-77 11:44:05





             Test Item    Value        Reference Range Interpretation Comments

 

             RBC Morph (test code = RBC As Indicated Normal       A            



             Morph)                                              

 

             Anisocyte (test code = 2+                        A            



             Anisocyte)                                          

 

             Hypochromia (test code = 1+                        A            



             Hypochromia)                                        

 

             Microcyte (test code = 1+                        A            



             Microcyte)                                          

 

             Differential Comment (test See Comment                            B

ANDS: 10%



             code = Differential Comment)                                       

 

 

             Plt Estimation (test code = Normal       Normal                    



             Plt Estimation)                                        



Urinalysis [id number] 11:40:02





             Test Item    Value        Reference Range Interpretation Comments

 

             UA WBC (test code = UA WBC) 0-5 /HPF     0-5                       

 

             UA RBC (test code = UA RBC) 0-4 /HPF     0-4                       

 

             UA Bacteria (test code = UA Negative /HPF Negative                 

 



             Bacteria)                                           

 

             UA Squam Epithelial (test code 0-20 /LPF    0-20                   

   



             = UA Squam Epithelial)                                        

 

             UA Hyal Cast (test code = UA 1-6 /LPF     1-6                      

 



             Hyal Cast)                                          



Urinalysis [id number] 11:40:02





             Test Item    Value        Reference Range Interpretation Comments

 

             UA WBC (test code = UA WBC) 0-5 /HPF     0-5                       

 

             UA RBC (test code = UA RBC) 0-4 /HPF     0-4                       

 

             UA Bacteria (test code = UA Negative /HPF Negative                 

 



             Bacteria)                                           

 

             UA Squam Epithelial (test code 0-20 /LPF    0-20                   

   



             = UA Squam Epithelial)                                        

 

             UA Hyal Cast (test code = UA 1-6 /LPF     1-6                      

 



             Hyal Cast)                                          

 

             UA Amorph Urate (test code = UA Moderate /HPF None         A       

     



             Amorph Urate)                                        



Urinalysis with Culture, if [id number] 11:40:01





             Test Item    Value        Reference Range Interpretation Comments

 

             UA Color (test code = UA Yellow       Yellow                    



             Color)                                              

 

             UA Appear (test code = Cloudy       Clear        A            



             UA Appear)                                          

 

             UA pH (test code = UA 5.0                                    



             pH)                                                 

 

             UA Spec Grav (test code 1.023 SGU    1.005-1.030               



             = UA Spec Grav)                                        

 

             UA Glucose (test code = 4+           Negative     A            



             UA Glucose)                                         

 

             UA Bili (test code = UA Negative     Negative                  



             Bili)                                               

 

             UA Ketones (test code = 1+           Negative     A            



             UA Ketones)                                         

 

             UA Blood (test code = UA 2+           Negative     A            



             Blood)                                              

 

             UA Protein (test code = Trace        Negative     A            



             UA Protein)                                         

 

             UA Urobilinogen (test 0.2 EU/dL    >0.2                      



             code = UA Urobilinogen)                                        

 

             UA Nitrite (test code = Negative     Negative                  



             UA Nitrite)                                         

 

             UA Leuk Est (test code = Negative     Negative                  



             UA Leuk Est)                                        

 

             UA Micro Ind? (test code Indicated    Not Indicated A            Re

sult created by



             = UA Micro Ind?)                                        rule



                                                                 GL_SET_UA_MICRO

_IND



Prothrombin Time and [id number] 11:33:49





             Test Item    Value        Reference Range Interpretation Comments

 

             Prothrombin Time (test code = 13.7 seconds 9.2-12.0     H          

  



             Prothrombin Time)                                        

 

             INR (test code = INR) 1.3 ratio    0.9-1.2      H            



Partial Thromboplastin [id number] 11:33:49





             Test Item    Value        Reference Range Interpretation Comments

 

             Partial Thromboplastin 31.5 seconds 24.0-35.0                 APTT 

Heparin



             Time (test code =                                        Therapeuti

c Range:



             Partial Thromboplastin                                        47.9-

80.4 seconds



             Time)                                               



Complete Blood Count with [id number] 11:28:57





             Test Item    Value        Reference Range Interpretation Comments

 

             WBC (test code = WBC) 6.0 x10      4.8-10.8                  

 

             RBC (test code = RBC) 3.60 x10     4.60-6.20    L            

 

             Hgb (test code = Hgb) 11.8 g/dL    14.0-18.0    L            

 

             MCV (test code = MCV) 94.3 fL      80.0-95.0                 

 

             Hct (test code = Hct) 33.9 %       38.0-52.0    L            

 

             RDW (test code = RDW) 14.1 %       11.5-14.5                 

 

             MCHC (test code = MCHC) 34.7 g/dL    31.0-36.0                 

 

             MCH (test code = MCH) 32.7 pg      26.0-32.0    H            

 

             Platelets (test code = Platelets) 290 x10      140-440             

      

 

             MPV (test code = MPV) 9.2 fL       7.5-11.2                  

 

             Slide Review (test code = Slide Smear                              

    



             Review)                                             



Automated [id number] 11:28:57





             Test Item    Value        Reference Range Interpretation Comments

 

             Neutro Auto (test code = Neutro Auto) 89.0 %                    N  

          

 

             Lymph Auto (test code = Lymph Auto) 4.7 %                     N    

        

 

             Mono Auto (test code = Mono Auto) 5.8 %                     N      

      

 

             Eos, Auto (test code = Eos, Auto) 0.2 %                     N      

      

 

             Basophil Auto (test code = Basophil 0.3 %                     N    

        



             Auto)                                               

 

             Neutro Absolute (test code = Neutro 5.4 x10      2.7-7.3           

        



             Absolute)                                           

 

             Lymph Absolute (test code = Lymph 0.3 x10      0.8-3.5      L      

      



             Absolute)                                           

 

             Mono Absolute (test code = Mono 0.3 x10      0.3-0.9               

    



             Absolute)                                           

 

             Eos Absolute (test code = Eos 0.0 x10      0.0-0.3                 

  



             Absolute)                                           

 

             Baso Absolute (test code = Baso 0.0 x10      0.0-0.1               

    



             Absolute)                                           



POC G3+ [id number] 11:17:39





             Test Item    Value        Reference Range Interpretation Comments

 

             pH Art (test code = pH Art) 7.48 pH units 7.35-7.45    H           

 

 

             pCO2 Art (test code = pCO2 Art) 49.8 mmHg    35.0-45.0    H        

    

 

             pO2 Art (test code = pO2 Art) 54.0 mmHg    85.0-100.0              

  

 

             O2 Sat Art (test code = O2 Sat 89.0 %       92.0-98.5    L         

   



             Art)                                                

 

             HCO3 Art (test code = HCO3 Art) 37.3 mmol/L  22.0-26.0    H        

    

 

             FIO2% (test code = FIO2%) 21.0 %                    N            

 

             Base Excess Arterial (test code 14 mmol/L    -2-2         H        

    



             = Base Excess Arterial)                                        

 

             Franklin's Test (test code = Pass         Pass                      



             Franklin's Test)                                        

 

             Del Sys (test code = Del Sys) Room Air                  N          

  

 

             Performing Site (test code = R Brachial                N           

 



             Performing Site)                                        



Capillary whole blood glucose measurement by glucometer (mass/volume)2020 
11:33:00





             Test Item    Value        Reference Range Interpretation Comments

 

             Bedside Glucose (test code = 173 mg/dL                             

 



             93124-6)                                            



[facility] (moles/volume)
2020 05:00:00





             Test Item    Value        Reference Range Interpretation Comments

 

             Sodium Level (test code = 2951-2) 136 mmol/L                       

      



[facility] potassium measurement (moles/volume)
2020 05:00:00





             Test Item    Value        Reference Range Interpretation Comments

 

             Potassium Level (test code = 4.8 mmol/L                            

 



             2823-3)                                             



[facility] chloride measurement (moles/volume)
2020 05:00:00





             Test Item    Value        Reference Range Interpretation Comments

 

             Chloride Level (test code = 105 mmol/L                             



             5-0)                                             



[facility] total carbon dioxide measurement 
(moles/volume)2020 05:00:00





             Test Item    Value        Reference Range Interpretation Comments

 

             Carbon Dioxide Level (test code = 22 mmol/L                        

      



             8-9)                                             



[facility] anion gap determination (moles/volume)
2020 05:00:00





             Test Item    Value        Reference Range Interpretation Comments

 

             Anion Gap (test code = 33037-3) 14                                 

    



[facility] urea nitrogen measurement (mass/volume)
2020 05:00:00





             Test Item    Value        Reference Range Interpretation Comments

 

             Blood Urea Nitrogen (test code = 17 mg/dL                          

     



             3094-0)                                             



[facility] creatinine measurement (mass/volume)
2020 05:00:00





             Test Item    Value        Reference Range Interpretation Comments

 

             Creatinine (test code = 2160-0) 1.2 mg/dL                          

    



[facility] [id number] 05:00:00





             Test Item    Value        Reference Range Interpretation Comments

 

             Estimat Glomerular Filtration Rate 68                              

       



             (test code = 31102-1)                                        



[facility] glucose measurement (mass/volume)
2020 05:00:00





             Test Item    Value        Reference Range Interpretation Comments

 

             Glucose Level (test code = 2345-7) 149 mg/dL                       

       



[facility] calcium measurement (mass/volume)
2020 05:00:00





             Test Item    Value        Reference Range Interpretation Comments

 

             Calcium Level (test code = 85746-6) 8.2 mg/dL                      

        



[facility] leukocyte count (number/volume)
2020 05:00:00





             Test Item    Value        Reference Range Interpretation Comments

 

             White Blood Count (test code = 10.4 10*3/uL                        

   



             6690-2)                                             



[facility] erythrocytes automated count (number/volume)
2020 05:00:00





             Test Item    Value        Reference Range Interpretation Comments

 

             Red Blood Count (test code = 3.11 10*6/uL                          

 



             789-8)                                              



[facility] hemoglobin measurement (mass/volume)2020 
05:00:00





             Test Item    Value        Reference Range Interpretation Comments

 

             Hemoglobin (test code = 718-7) 9.9 g/dL                            

   



[facility] ElizabethAutom[facility] blood hematocrit (volume fraction)2020 
05:00:00





             Test Item    Value        Reference Range Interpretation Comments

 

             Hematocrit (test code = 4544-3) 30.2 %                             

    



[facility] erythrocyte mean corpuscular volume (MCV) 
[id number] 05:00:00





             Test Item    Value        Reference Range Interpretation Comments

 

             Mean Corpuscular Volume (test code = 97 fL                         

         



             787-2)                                              



[facility] erythrocyte mean corpuscular hemoglobin (mass 
per erythrocyte)2020 05:00:00





             Test Item    Value        Reference Range Interpretation Comments

 

             Mean Corpuscular Hemoglobin (test 31.8 pg                          

      



             code = 785-6)                                        



[facility] erythrocyte mean corpuscular hemoglobin 
concentration measurement (mass/[id number] 05:00:00





             Test Item    Value        Reference Range Interpretation Comments

 

             Mean Corpuscular Hemoglobin Concent 32.8 g/dL                      

        



             (test code = 786-4)                                        



[facility] erythrocyte distribution width [id number]
 05:00:00





             Test Item    Value        Reference Range Interpretation Comments

 

             Red Cell Distribution Width (test code 13.2 %                      

           



             = 788-0)                                            



[facility] ElizabethAutom[facility] blood platelet count (count/volume)2020 
05:00:00





             Test Item    Value        Reference Range Interpretation Comments

 

             Platelet Count (test code = 492 10*3/uL                            



             777-3)                                              



[facility] platelet mean volume measurement
2020 05:00:00





             Test Item    Value        Reference Range Interpretation Comments

 

             Mean Platelet Volume (test code = 9.6                              

      



             67348-3)                                            



[facility] neutrophil count as percentage of total 
xilprpslfp0535-22-87 05:00:00





             Test Item    Value        Reference Range Interpretation Comments

 

             Neutrophils (%) (Auto) (test code = 71 %                           

        



             770-8)                                              



[facility] ElizabethAutom[facility] blood immature granulocyte count as percentage
 of total [id number] 05:00:00





             Test Item    Value        Reference Range Interpretation Comments

 

             Immature Granulocyte % (Auto) (test 1 %                            

        



             code = 73800-2)                                        



[facility] ElizabethAutom[facility] blood lymphocyte count as percentage of total 
[id number] 05:00:00





             Test Item    Value        Reference Range Interpretation Comments

 

             Lymphocytes (%) (Auto) (test code = 19 %                           

        



             736-9)                                              



[facility] ElizabethAutom[facility] blood monocyte count as percentage of total 
rqypglwxtq3393-67-11 05:00:00





             Test Item    Value        Reference Range Interpretation Comments

 

             Monocytes (%) (Auto) (test code = 8 %                              

      



             5905-5)                                             



[facility] blood eosinophil count as percentage of total 
uktomyolhh5986-70-89 05:00:00





             Test Item    Value        Reference Range Interpretation Comments

 

             Eosinophils (%) (Auto) (test code = 1 %                            

        



             713-8)                                              



[facility] blood basophil count as percentage of total 
eudossyxlr7818-66-76 05:00:00





             Test Item    Value        Reference Range Interpretation Comments

 

             Basophils (%) (Auto) (test code = 1 %                              

      



             706-2)                                              



[facility] blood nucleated erythrocyte count as 
percentage of total zjkpeqnuds8941-65-29 05:00:00





             Test Item    Value        Reference Range Interpretation Comments

 

             Nucleated Red Blood Cells % (test code 0.0 %                       

           



             = 21671-0)                                          



[facility] neutrophil count (number/volume)
2020 05:00:00





             Test Item    Value        Reference Range Interpretation Comments

 

             Neutrophils # (Auto) (test code = 7.4 10*3/uL                      

      



             751-8)                                              



[facility] ElizabethAutom[facility] blood immature granulocyte count as percentage
 of total bhoumgrxhd2695-85-05 05:00:00





             Test Item    Value        Reference Range Interpretation Comments

 

             Immature Granulocyte # (Auto) 0.1 10*3/uL                          

  



             (test code = 58352-7)                                        



[facility] ElizabethAutom[facility] blood lymphocyte count (number/volume)
2020 05:00:00





             Test Item    Value        Reference Range Interpretation Comments

 

             Lymphocytes # (Auto) (test code = 2.0 10*3/uL                      

      



             731-0)                                              



[facility] monocytes automated count (number/volume)
2020 05:00:00





             Test Item    Value        Reference Range Interpretation Comments

 

             Monocytes # (Auto) (test code = 0.8 10*3/uL                        

    



             742-7)                                              



[facility] ElizabethAutomated blood eosinophil [id number] 05:00:00





             Test Item    Value        Reference Range Interpretation Comments

 

             Eosinophils # (Auto) (test code = 0.1 10*3/uL                      

      



             711-2)                                              



[facility] ElizabethAutom[facility] blood basophil count (number/volume)2020
 05:00:00





             Test Item    Value        Reference Range Interpretation Comments

 

             Basophils # (Auto) (test code = 0.1 10*3/uL                        

    



             704-7)                                              



[facility] ElizabethAutomated blood nucleated erythrocyte count 
(count/volume)2020 05:00:00





             Test Item    Value        Reference Range Interpretation Comments

 

             Nucleated Red Blood Cells # 0.00 10*3/uL                           



             (test code = 771-6)                                        



[facility] ElizabethService comment [id number] 05:00:00





             Test Item    Value        Reference Range Interpretation Comments

 

             Manual Differential (test code = Not Ind                           

     



             8265-1)                                             



[facility]/100 leukocytes
2020-03-15 05:30:00





             Test Item    Value        Reference Range Interpretation Comments

 

             Neutrophils % (Manual) (test code = 54 %                           

        



             769-0)                                              



[facility] neutrophils form/100 leukocytes
2020-03-15 05:30:00





             Test Item    Value        Reference Range Interpretation Comments

 

             Band Neutrophils % (Manual) (test code 2 %                         

           



             = 764-1)                                            



[facility] lymphocytes/100 leukocytes2020-03-15 
05:30:00





             Test Item    Value        Reference Range Interpretation Comments

 

             Lymphocytes % (Manual) (test code = 34 %                           

        



             737-7)                                              



[facility] blood monocytes/100 leukocytes2020-03-15 05:30:00





             Test Item    Value        Reference Range Interpretation Comments

 

             Monocytes % (Manual) (test code = 7 %                              

      



             744-3)                                              



[facility] eosinophil count as percentage of total 
leukocytes2020-03-15 05:30:00





             Test Item    Value        Reference Range Interpretation Comments

 

             Eosinophils % (Manual) (test code = 1 %                            

        



             714-6)                                              



[facility] blood basophils/100 leukocytes2020-03-15 05:30:00





             Test Item    Value        Reference Range Interpretation Comments

 

             Basophils % (Manual) (test code = 1 %                              

      



             707-0)                                              



[facility] blood metamyelocytes/100 leukocytes2020-03-15 
05:30:00





             Test Item    Value        Reference Range Interpretation Comments

 

             Metamyelocytes % (test code = 740-1) 1 %                           

         



[facility] detection by light [facility]2020-03-15 
05:30:00





             Test Item    Value        Reference Range Interpretation Comments

 

             Platelet Estimate (test code = Adequate                            

   



             9317-9)                                             



[facility] erythrocyte morphology finding identification
2020-03-15 05:30:00





             Test Item    Value        Reference Range Interpretation Comments

 

             Red Blood Cell Morphology (test code = Normal                      

           



             6742-1)                                             



[facility] Elizabeth[facility] or plasma beta hydroxybutyrate measurement 
(moles/volume)2020 18:00:00





             Test Item    Value        Reference Range Interpretation Comments

 

             Beta-Hydroxybutyric Acid (test 0.05 mmol/L                         

   



             code = 6873-4)                                        



[facility] Elizabeth[facility] blood pH [id number] 17:53:00





             Test Item    Value        Reference Range Interpretation Comments

 

             Arterial Blood pH (test code = 2744-1) 7.455                       

           



[facility] ElizabethArterial blood partial pressure of carbon dioxide
2020 17:53:00





             Test Item    Value        Reference Range Interpretation Comments

 

             Arterial Blood Partial Pressure 37.9 mm[Hg]                        

    



             CO2 (test code = 2019-8)                                        



[facility]. ElizabethArterial blood partial pressure of oxygen measurement
2020 17:53:00





             Test Item    Value        Reference Range Interpretation Comments

 

             Arterial Blood Partial Pressure 78.4 mm[Hg]                        

    



             O2 (test code = 2703-7)                                        



[facility]. ElizabethArterial blood bicarbonate measurement (moles/volume)
2020 17:53:00





             Test Item    Value        Reference Range Interpretation Comments

 

             Arterial Blood HCO3 (test code = 26.0 mmol/L                       

     



             -4)                                             



[facility] ElizabethArterial blood base excess determination by calculation 
(moles/volume)2020 17:53:00





             Test Item    Value        Reference Range Interpretation Comments

 

             Arterial Blood Base Excess (test 2.2 mmol/L                        

     



             code = 1925-7)                                        



[facility] ElizabeArterial blood hemoglobin measurement by oximetry 
(mass/volume)2020 17:53:00





             Test Item    Value        Reference Range Interpretation Comments

 

             Arterial Blood Hemoglobin (test 11.6 g/dL                          

    



             code = 90225-9)                                        



[facility] ElizabethArterial blood oxygen saturation [id number] 
17:53:00





             Test Item    Value        Reference Range Interpretation Comments

 

             Arterial Blood Oxygen Saturation (test 96.0 %                      

           



             code = 2708-6)                                        



[facility] ElizabethArterial blood carboxyhemoglobin/total hemoglobin ratio 
(mass fraction)2020 17:53:00





             Test Item    Value        Reference Range Interpretation Comments

 

             Arterial Blood Carboxyhemoglobin (test 1.9 %                       

           



             code = 2030-5)                                        



[facility]. ElizabethArterial blood methemoglobin/total hemoglobin ratio 
(mass fraction)2020 17:53:00





             Test Item    Value        Reference Range Interpretation Comments

 

             Arterial Blood Methemoglobin (test code 0.3 %                      

            



             = 2615-3)                                           



[facility] ElizabethArterial blood oxyhemoglobin/total hemoglobin ratio 
(mass fraction)2020 17:53:00





             Test Item    Value        Reference Range Interpretation Comments

 

             Arterial Blood Oxyhemoglobin (test 93.9 %                          

       



             code = 2714-4)                                        



[facility]. ElizabeArterial blood deoxyhemoglobin/total hemoglobin mass 
[id number] 17:53:00





             Test Item    Value        Reference Range Interpretation Comments

 

             Reduced Hemoglobin (test code = 3.9 %                              

    



             11530-1)                                            



[facility] - . ElizabeArterial blood oxygen content by [id number] 
17:53:00





             Test Item    Value        Reference Range Interpretation Comments

 

             Arterial Blood Oxygen Content 15.4 mL/dL                           

  



             (test code = 75185-9)                                        



[facility] - . ElizabeGas deliv source [id number] 17:53:00





             Test Item    Value        Reference Range Interpretation Comments

 

             Oxygen Delivery Device (test code = ROOM AIR                       

        



             07715-5)                                            



[facility]Land[facility] Centerpecimen [facility] from [id number] 17:53:00





             Test Item    Value        Reference Range Interpretation Comments

 

             Blood Gas Puncture Site (test Left Radial                          

  



             code = 92422-1)                                        



[facility] ElizabeAssessment of wrist artery patency prior to arterial 
[id number] 17:53:00





             Test Item    Value        Reference Range Interpretation Comments

 

             Franklin Test (test code = 99173-8) Pass                              

     



[facility] ElizabethSerum or plasma magnesium measurement (mass/volume)
2020 14:10:00





             Test Item    Value        Reference Range Interpretation Comments

 

             Magnesium Level (test code = 1.67 mg/dL                            

 



             71068-0)                                            



[facility] - . ElizabeVenous whole blood pH [id number] 14:04:00





             Test Item    Value        Reference Range Interpretation Comments

 

             Venous Blood pH (test code = 2746-6) 7.448                         

         



[facility] ElizabethVenous blood partial pressure of carbon dioxide 
iwweocjvzhv8774-36-12 14:04:00





             Test Item    Value        Reference Range Interpretation Comments

 

             Blood Gas PCO2 (test code = 31.4 mm[Hg]                            



             -4)                                             



[facility]abeUniversity [facility] Centerous blood partial pressure of oxygen measurement
2020 14:04:00





             Test Item    Value        Reference Range Interpretation Comments

 

             Blood Gas PO2 (test code = 37.2 mm[Hg]                            



             5-2)                                             



[facility] ElizabethVenous blood bicarbonate measurement (moles/volume)
2020 14:04:00





             Test Item    Value        Reference Range Interpretation Comments

 

             Venous Blood HCO3 (test code = 21.2 mmol/L                         

   



             34790-1)                                            



[facility] ElizabeVenous whole blood base excess determination by 
calculation (moles/volume)2020 14:04:00





             Test Item    Value        Reference Range Interpretation Comments

 

             Venous Blood Base Excess (test -1.8 mmol/L                         

   



             code = 1927-3)                                        



[facility]. ElizabethBlood hemoglobin measurement by oximetry (mass/volume)
2020 14:04:00





             Test Item    Value        Reference Range Interpretation Comments

 

             Venous Blood Hemoglobin (test code 13.9 g/dL                       

       



             = 57910-7)                                          



[facility] EliLog[facility] Hospitalous [facility] saturation (mass fraction)2020
 14:04:00





             Test Item    Value        Reference Range Interpretation Comments

 

             Venous Blood Oxygen Saturation (test 76.3 %                        

         



             code = 2711-0)                                        



[facility] ElibeUniversity [facility]als Geneva [facility] Centerous blood carboxyhemoglobin/total hemoglobin ratio
2020 14:04:00





             Test Item    Value        Reference Range Interpretation Comments

 

             Venous Blood Carboxyhemoglobin (test 3.8 %                         

         



             code = 2032-1)                                        



[facility]SS[facility] whole blood methemoglobin/total hemoglobin (mass 
fraction)2020 14:04:00





             Test Item    Value        Reference Range Interpretation Comments

 

             Venous Blood Methemoglobin (test code = 0.3 %                      

            



             2617-9)                                             



[facility] ElibeUniversity [facility] Centerous blood carboxyhemoglobin/total hemoglobin ratio
2020 14:04:00





             Test Item    Value        Reference Range Interpretation Comments

 

             Venous Blood Oxyhemoglobin (test code 73.2 %                       

          



             = 2032-1)                                           



[facility] ElibeUniversity [facility] Centerous blood deoxyhemoglobin/total hemoglobin mass ratio
2020 14:04:00





             Test Item    Value        Reference Range Interpretation Comments

 

             Reduced Hemoglobin (test code = 22.7 %                             

    



             21622-6)                                            



[facility] ElizabeUniversity [facility] Centerous blood oxygen content by [id number] 
14:04:00





             Test Item    Value        Reference Range Interpretation Comments

 

             Venous Blood Oxygen Content (test 14.3 mL/dL                       

      



             code = 55147-3)                                        



[facility] HeightsdimitristhGas deliv source [id number] 14:04:00





             Test Item    Value        Reference Range Interpretation Comments

 

             Oxygen Delivery Device (test code = ROOM AIR                       

        



             54564-7)                                            



[facility] MarieKharipecimen drawn from Zsqjwbg1408-42-15 14:04:00





             Test Item    Value        Reference Range Interpretation Comments

 

             Blood Gas Puncture Site (test code = Venous                        

         



             57812-3)                                            



[facility] ElizaberichmondAssessment of wrist artery patency prior to arterial 
[id number] 14:04:00





             Test Item    Value        Reference Range Interpretation Comments

 

             Franklin Test (test code = Not Applicable                           



             80414-0)                                            



[facility] ElizabethSer[facility] or plasma natriuretic peptide B measurement 
(mass/volume)2020 05:30:00





             Test Item    Value        Reference Range Interpretation Comments

 

             B-Type Natriuretic Peptide (test 224 pg/mL                         

     



             code = 98728-8)                                        



[facility] ElizabethBacterial blood culture2020-03-10 17:10:00





             Test Item    Value        Reference Range Interpretation Comments

 

             Blood Culture (test code No growth in 5 days                       

    



             = 600-7)                                            



[facility] ElizabethSerum or plasma vancomycin measurement (mass/volume)
2020 03:20:00





             Test Item    Value        Reference Range Interpretation Comments

 

             Random Vancomycin Level (test code 26.4 ug/mL                      

       



             = 08267-3)                                          



[facility] ElizabethUrine sodium measurement (moles/volume)2020 
14:50:00





             Test Item    Value        Reference Range Interpretation Comments

 

             Urine Random Sodium (test code = 58 mmol/L                         

     



             2955-3)                                             



[facility] ElizaberichmondAerobic bacterial wound [id number] 09:35:00





             Test Item    Value        Reference Range Interpretation Comments

 

             Wound Culture (test Streptococcus agalactiae                       

    



             code = 632-0)                                        



[facility] ElizaberichmondBacterial anaerobic looqjna8811-79-47 09:35:00





             Test Item    Value        Reference Range Interpretation Comments

 

             Anaerobic Culture (test No anaerobes isolated                      

     



             code = 635-3) in 48 hours                            



[id number] 06:05:00





             Test Item    Value        Reference Range Interpretation Comments

 

             Blood Gas Critical Value DOMINIC COTTER RN                         

  



             Called To (test code =                                        



             8264-4)                                             



[facility] [id number] 06:05:00





             Test Item    Value        Reference Range Interpretation Comments

 

             Blood Gas Notified Time (test [id number]                       

    



             code = 8265-1)                                        



[facility]Po[facility] HospitalUrinalysis specimen collection [id number] 03:45:00





             Test Item    Value        Reference Range Interpretation Comments

 

             Urine Source (test code = 19159-3) URCATH                          

       



[facility] CenterthColor of Urine by Repp4995-05-94 03:45:00





             Test Item    Value        Reference Range Interpretation Comments

 

             Urine Color (test code = 03224-5) Yellow                           

      



[facility] HospitalU[facility] clarity hiwuagmidhitm7446-49-91 03:45:00





             Test Item    Value        Reference Range Interpretation Comments

 

             Urine Appearance (test code = Light Turbid                         

  



             00691-7)                                            



[facility] pH measurement by automated test [id number] 
03:45:00





             Test Item    Value        Reference Range Interpretation Comments

 

             Urine pH (test code = 05602-9) 6.0                                 

   



[facility] Hospitalpecific [facility] by Automated test strip
2020 03:45:00





             Test Item    Value        Reference Range Interpretation Comments

 

             Urine Specific Gravity (test code = 1.019                          

        



             83429-0)                                            



[facility] protein measurement by automated test strip 
(mass/volume)2020 03:45:00





             Test Item    Value        Reference Range Interpretation Comments

 

             Urine Protein (test code = 44006-9) 100 mg/dL                      

        



[facility] glucose measurement by automated test strip 
(mass/volume)2020 03:45:00





             Test Item    Value        Reference Range Interpretation Comments

 

             Urine Glucose (UA) (test code = >1000 mg/dL                        

    



             36566-4)                                            



[facility] ketones measurement by automated test strip 
(mass/volume)2020 03:45:00





             Test Item    Value        Reference Range Interpretation Comments

 

             Urine Ketones (test code = 20254-2) 80 mg/dL                       

        



[facility] erythrocytes count by automated test strip 
(number/volume)2020 03:45:00





             Test Item    Value        Reference Range Interpretation Comments

 

             Urine Occult Blood (test code = 2+                                 

    



             22527-1)                                            



[facility] nitrite detection by automated test strip
2020 03:45:00





             Test Item    Value        Reference Range Interpretation Comments

 

             Urine Nitrite (test code = 50500-1) Negative                       

        



[facility] total bilirubin measurement by automated test 
strip (mass/volume)2020 03:45:00





             Test Item    Value        Reference Range Interpretation Comments

 

             Urine Bilirubin (test code = Negative mg/dL                        

   



             51943-0)                                            



[facility] urobilinogen measurement by automated test strip 
(mass/volume)2020 03:45:00





             Test Item    Value        Reference Range Interpretation Comments

 

             Urine Urobilinogen (test code Negative mg/dL                       

    



             = 39153-4)                                          



[facility] leukocytes count by automated test strip 
(number/volume)2020 03:45:00





             Test Item    Value        Reference Range Interpretation Comments

 

             Urine Leukocyte Esterase Negative {Vika}/uL                         

  



             (test code = 88647-1)                                        



[facility] HospitalMicroscopic examination of [id number] 03:45:00





             Test Item    Value        Reference Range Interpretation Comments

 

             Microscopic Urinalysis (T) (test code = -----                      

            



             37130-6)                                            



[facility] sediment erythrocyte count by microscopy 
(number/high power field)2020 03:45:00





             Test Item    Value        Reference Range Interpretation Comments

 

             Urine RBC (test code = 37762-9) 3-10 /[HPF]                        

    



[facility] sediment leukocyte count by microscopy 
(number/high power field)2020 03:45:00





             Test Item    Value        Reference Range Interpretation Comments

 

             Urine WBC (test code = 5821-4) 6-20 /[HPF]                         

   



[facility]The [facility] sediment epithelial cell count by microscopy 
(number/high power field)2020 03:45:00





             Test Item    Value        Reference Range Interpretation Comments

 

             Urine Epithelial Cells (test code Rare /[HPF]                      

      



             = 5787-7)                                           



[facility] sediment crystal count by microscopy (number/high 
power field)2020 03:45:00





             Test Item    Value        Reference Range Interpretation Comments

 

             Urine Crystals (test code = None Seen /[HPF]                       

    



             05972-1)                                            



[facility] ElijordanUrine sediment bacteria count by microscopy (number/high
 power field)2020 03:45:00





             Test Item    Value        Reference Range Interpretation Comments

 

             Urine Bacteria (test code = Few /[HPF]                             



             5769-5)                                             



[facility] ElizabethU[facility] sediment casts count by microscopy (number/low 
power field)2020 03:45:00





             Test Item    Value        Reference Range Interpretation Comments

 

             Urine Casts (test code = Present /[LPF]                           



             9842-6)                                             



[facility] hyaline cast count by microscopy 
(number/low power field)2020 03:45:00





             Test Item    Value        Reference Range Interpretation Comments

 

             Urine Hyaline Casts (test code = 2-5 /[LPF]                        

     



             5796-8)                                             



[facility] - . ElilianabethYeast detection in urine sediment by light microscopy
2020 03:45:00





             Test Item    Value        Reference Range Interpretation Comments

 

             Urine Yeast (test code = None Seen /[HPF]                          

 



             09938-3)                                            



[facility] comment  03:45:00





             Test Item    Value        Reference Range Interpretation Comments

 

             Urinalysis Comment (test code = 8262-8) *                          

            



[facility] comment  03:45:00





             Test Item    Value        Reference Range Interpretation Comments

 

             Urine Culture Indicated (test code To follow                       

       



             = 8264-4)                                           



[facility]Influenza virus A RNA detection by probe and target 
amplification [id number] 03:45:00





             Test Item    Value        Reference Range Interpretation Comments

 

             Influenza Virus Type A (PCR) (test NEGATIVE                        

       



             code = 78499-5)                                        



[facility]fluenza virus B RNA detection by probe and target 
amplification [id number] 03:45:00





             Test Item    Value        Reference Range Interpretation Comments

 

             Influenza Virus Type B (PCR) (test NEGATIVE                        

       



             code = 07522-6)                                        



[facility] ElizabethBacterial urine [id number] 03:45:00





             Test Item    Value        Reference Range Interpretation Comments

 

             Urine Culture (test code = 630-4) No growth                        

      



[facility] or plasma phosphate measurement (mass/volume)
2020 03:20:00





             Test Item    Value        Reference Range Interpretation Comments

 

             Phosphorus Level (test code = 3.7 mg/dL                            

  



             2777-1)                                             



[facility] by pulse 
[id number] 21:02:00





             Test Item    Value        Reference Range Interpretation Comments

 

             Oxygen Saturation (Pulse Oximetry) 96 %                            

       



             (test code = 75446-7)                                        



[facility] [id number] 21:02:00





             Test Item    Value        Reference Range Interpretation Comments

 

             Blood Gas Respiration Rate (test code 20 /min                      

          



             = 9279-1)                                           



[facility] [id number] 21:02:00





             Test Item    Value        Reference Range Interpretation Comments

 

             N/A (test code = 8268-5) 1                                      



[facility] Limapatitis B [facility]/plas qual by [id number] 20:12:00





             Test Item    Value        Reference Range Interpretation Comments

 

             Lactic Acid Comment (test code Reprint/Called                      

     



             = Lactic Acid Comment)                                        



[facility] HospitalDetermination of inhaled oxygen concentration (volume 
fraction)2020 18:28:00





             Test Item    Value        Reference Range Interpretation Comments

 

             FiO2 (test code = 3150-0) 21 %                                   



[facility] methamphetamine [id number] 18:05:00





             Test Item    Value        Reference Range Interpretation Comments

 

             Urine Methamphetamines Screen Positive ng/mL                       

    



             (test code = 21815-3)                                        



[facility] propoxyphene screening [id number] 18:05:00





             Test Item    Value        Reference Range Interpretation Comments

 

             Urine Propoxyphene Screen Negative ng/mL                           



             (test code = 22252-9)                                        



[facility] Hospital[facility] amphetamines detection by screening method
2020 18:05:00





             Test Item    Value        Reference Range Interpretation Comments

 

             Urine Amphetamines Screen Positive ng/mL                           



             (test code = 94088-9)                                        



[facility] buprenorphine screen with reflex confirmation
2020 18:05:00





             Test Item    Value        Reference Range Interpretation Comments

 

             Urine Buprenorphine (test code Negative ng/mL                      

     



             = 3414-0)                                           



[facility] barbiturates detection by screening method
2020 18:05:00





             Test Item    Value        Reference Range Interpretation Comments

 

             Urine Barbiturates Screen Negative ng/mL                           



             (test code = 25630-7)                                        



[facility] benzodiazepines detection by screening method
2020 18:05:00





             Test Item    Value        Reference Range Interpretation Comments

 

             Urine Benzodiazepines Screen Negative ng/mL                        

   



             (test code = 67563-2)                                        



[facility] ElizabethUrine benzoylecgonine detection by screening method
2020 18:05:00





             Test Item    Value        Reference Range Interpretation Comments

 

             Urine Cocaine Screen (test Negative ng/mL                          

 



             code = 69403-9)                                        



[facility] Elizabeth[facility] methadone [id number] 18:05:00





             Test Item    Value        Reference Range Interpretation Comments

 

             Urine Methadone, Qualitative Negative ng/mL                        

   



             (test code = 19550-3)                                        



[facility]. ElizabethUrine opiates screening [id number] 18:05:00





             Test Item    Value        Reference Range Interpretation Comments

 

             Urine Opiates Screen (test Negative ng/mL                          

 



             code = 76162-5)                                        



[facility]. ElizabethUrine phencyclidine detection by screening method
2020 18:05:00





             Test Item    Value        Reference Range Interpretation Comments

 

             Urine Phencyclidine Screen Negative ng/mL                          

 



             (test code = 72900-8)                                        



[facility]. ElizabethUrine cannabinoids detection by screening method
2020 18:05:00





             Test Item    Value        Reference Range Interpretation Comments

 

             Urine Cannabinoids (test code Negative ng/mL                       

    



             = 24932-8)                                          



[facility] - . ElizabethScreening urine tricyclic antidepressants detection
2020 18:05:00





             Test Item    Value        Reference Range Interpretation Comments

 

             Ur Tricyclic Antidepressants Negative ng/mL                        

   



             Screen (test code = 92079-1)                                       

 



[facility] - . ElizabethUrine oxycodone detection by screening [id number] 
18:05:00





             Test Item    Value        Reference Range Interpretation Comments

 

             Urine Oxycodone Screen (test Negative ng/mL                        

   



             code = 90595-8)                                        



[facility]. ElizabethSpecific gravity of Urine by Automated test strip
2020 18:05:00





             Test Item    Value        Reference Range Interpretation Comments

 

             Urine Specific Gravity (test code = 1.019                          

        



             18546-4)                                            



[facility]. ElizabethUrine pH measurement by automated test [id number] 
18:05:00





             Test Item    Value        Reference Range Interpretation Comments

 

             Urine pH (test code = 76086-7) 5.5                                 

   



[facility]. ElizabethUrine drug screen comment [id number] 
18:05:00





             Test Item    Value        Reference Range Interpretation Comments

 

             Urine Drug Screen Comment (test code See Note                      

         



             = 58967-8)                                          



[facility] or plasma lipase measurement (enzymatic 
activity/volume)2020 17:48:00





             Test Item    Value        Reference Range Interpretation Comments

 

             Lipase (test code = 3040-3) 62 U/L                                 



[facility]. ElizabethOsmolality ser/nwyr2215-76-21 17:48:00





             Test Item    Value        Reference Range Interpretation Comments

 

             Serum Osmolality (test code = 359 mosm/kg                          

  



             2692-2)                                             



[facility] ElizabethSerum or plasma acetaminophen measurement (mass/volume)
2020 17:48:00





             Test Item    Value        Reference Range Interpretation Comments

 

             Acetaminophen Level (test code = < 0.6 ug/mL                       

     



             3298-7)                                             



[facility]. ElizabethSalicylate ser/qkxy9586-08-48 17:48:00





             Test Item    Value        Reference Range Interpretation Comments

 

             Salicylates Level (test code = < 5.0 mg/dL                         

   



             4024-6)                                             



[facility]Ser[facility] or plasma ethanol measurement (mass/volume)
2020 17:48:00





             Test Item    Value        Reference Range Interpretation Comments

 

             Ethyl Alcohol Level (test code = 112 mg/dL                         

     



             5643-2)                                             



[facility]. ElizabethWhole blood cardiac troponin I measurement (mass/volume)
2020 17:10:00





             Test Item    Value        Reference Range Interpretation Comments

 

             Bedside Troponin I (test code = 0.01 ng/mL                         

    



             45169-4)                                            



[facility]. ElizabethVenous whole blood glucose measurement (mass/volume)
2020 17:09:00





             Test Item    Value        Reference Range Interpretation Comments

 

             Bedside Glucose (test code = 520 mg/dL                             

 



             25484-2)                                            



[facility]. ElizabethVenous blood lactic acid measurement (moles/volume)
2020 17:05:00





             Test Item    Value        Reference Range Interpretation Comments

 

             Bedside Lactic Acid Venous (test 12.08 mmol/L                      

     



             code = 2519-7)                                        



[facility] HospitalProthrombin [facility] in platelet poor [id number] 
17:00:00





             Test Item    Value        Reference Range Interpretation Comments

 

             Prothrombin Time (test code = 5902-2) 12.5 s                       

          



[facility]. ElibethINR in Platelet poor plasma by Coagulation assay
2020 17:00:00





             Test Item    Value        Reference Range Interpretation Comments

 

             Prothromb Time International 1.3 {ratio}                           

 



             Ratio (test code = 6301-6)                                        



[facility]Land[facility] Centererum or plasma total bilirubin measurement 
(mass/volume)2020 16:05:00





             Test Item    Value        Reference Range Interpretation Comments

 

             Total Bilirubin (test code = 0.3 mg/dL                             

 



             -2)                                             



[facility]. [facility] or plasma aspartate aminotransferase measurement 
(enzymatic activity/volume)2020 16:05:00





             Test Item    Value        Reference Range Interpretation Comments

 

             Aspartate Amino Transf (AST/SGOT) 40 U/L                           

      



             (test code = 1920-8)                                        



[facility]. [facility] or plasma alanine aminotransferase measurement 
(enzymatic activity/volume)2020 16:05:00





             Test Item    Value        Reference Range Interpretation Comments

 

             Alanine Aminotransferase (ALT/SGPT) 52 U/L                         

        



             (test code = 1742-6)                                        



[facility]. [facility] or plasma protein measurement (mass/volume)
2020 16:05:00





             Test Item    Value        Reference Range Interpretation Comments

 

             Total Protein (test code = 2885-2) 6.7 g/dL                        

       



[facility] or plasma albumin measurement (mass/volume)
2020 16:05:00





             Test Item    Value        Reference Range Interpretation Comments

 

             Albumin (test code = 1751-7) 4.0 g/dL                              

 



[facility] or plasma alkaline phosphatase measurement 
(enzymatic activity/volume)2020 16:05:00





             Test Item    Value        Reference Range Interpretation Comments

 

             Alkaline Phosphatase (test code = 275 U/L                          

      



             6768-6)                                             



[facility] HospitalDetermination of inhaled oxygen concentration (volume 
fraction)2020 16:00:00





             Test Item    Value        Reference Range Interpretation Comments

 

             FiO2 (test code = 3150-0) 21 %                                   



[facility]  [id number] 16:00:00





             Test Item    Value        Reference Range Interpretation Comments

 

             Blood Gas Critical Value Called LENCHO GRIGGS RN                      

     



             To (test code = 8264-4)                                        



[facility]  [id number] 16:00:00





             Test Item    Value        Reference Range Interpretation Comments

 

             Blood Gas Notified Time (test [id number]                       

    



             code = 8265-1)                                        



[facility] [id number] 16:00:00





             Test Item    Value        Reference Range Interpretation Comments

 

             Blood Gas Comments (test PH,HCO3 OUT OF PRR                        

   



             code = 8267-7)                                        



[facility] whole blood sodium measurement (moles/volume)
2020 15:53:00





             Test Item    Value        Reference Range Interpretation Comments

 

             Bedside Sodium (test code = 127 mmol/L                             



             85630-3)                                            



[facility] whole blood potassium measurement (moles/volume)
2020 15:53:00





             Test Item    Value        Reference Range Interpretation Comments

 

             Bedside Potassium (test code = 4.3 mmol/L                          

   



             24553-9)                                            



[facility] whole blood chloride measurement (moles/volume)
2020 15:53:00





             Test Item    Value        Reference Range Interpretation Comments

 

             Bedside Chloride (test code = 92 mmol/L                            

  



             06460-0)                                            



[facility] whole blood total carbon dioxide measurement 
(moles/volume)2020 15:53:00





             Test Item    Value        Reference Range Interpretation Comments

 

             Bedside Total CO2 (test code = 6.0 mmol/L                          

   



             7-1)                                             



[facility] whole blood urea nitrogen (BUN) measurement 
(mass/volume)2020 15:53:00





             Test Item    Value        Reference Range Interpretation Comments

 

             Bedside Blood Urea Nitrogen (test 36 mg/dL                         

      



             code = 82070-3)                                        



[facility] creatinine measurement (mass/volume)2020 
15:53:00





             Test Item    Value        Reference Range Interpretation Comments

 

             Bedside Creatinine (test code = 2.5 mg/dL                          

    



             93093-1)                                            



[facility] (moles/volume)
2020 15:53:00





             Test Item    Value        Reference Range Interpretation Comments

 

             Bedside Whole Blood Ionized 1.07 mmol/L                            



             Calcium (test code = 1994-3)                                       

 



[facility] anion [id number] 15:53:00





             Test Item    Value        Reference Range Interpretation Comments

 

             Bedside Anion Gap (test code = 42054-3) 34                         

            



[facility] CenterthGFR estimate [id number] 15:53:00





             Test Item    Value        Reference Range Interpretation Comments

 

             Estimat Glomerular Filtration Rate 29                              

       



             (test code = 82691-8)                                        



[facility] (mass/volume)
2020 15:53:00





             Test Item    Value        Reference Range Interpretation Comments

 

             Bedside Hemoglobin (test code = 17.0 g/dL                          

    



             20469-8)                                            



[facility] blood hematocrit (volume fraction)2020 
15:53:00





             Test Item    Value        Reference Range Interpretation Comments

 

             Bedside Hematocrit (test code = 50.0 %                             

    



             81076-9)                                            



[facility] blood myelocyte count as percentage of leukocytes
 (number fraction)2020 15:50:00





             Test Item    Value        Reference Range Interpretation Comments

 

             Myelocytes % (test code = 749-2) 7 %                               

     



[facility] HospitalHgb A1c MFr [id number] 15:50:00





             Test Item    Value        Reference Range Interpretation Comments

 

             Hemoglobin A1c (test code = 4548-4) 12.9 %                         

        



[facility] whole blood glucose measurement by glucometer 
(mass/volume)2020 11:12:00





             Test Item    Value        Reference Range Interpretation Comments

 

             Bedside Glucose (test code = 307 mg/dL                             

 



             66151-2)                                            



[facility] HospitalAutomated blood leukocyte count (number/volume)
2020 04:30:00





             Test Item    Value        Reference Range Interpretation Comments

 

             White Blood Count (test code = 7.4 10*3/uL                         

   



             6690-2)                                             



[facility] erythrocytes automated count (number/volume)
2020 04:30:00





             Test Item    Value        Reference Range Interpretation Comments

 

             Red Blood Count (test code = 3.69 10*6/uL                          

 



             789-8)                                              



[facility] ElizabethBlood hemoglobin measurement (mass/volume)2020 
04:30:00





             Test Item    Value        Reference Range Interpretation Comments

 

             Hemoglobin (test code = 718-7) 11.9 g/dL                           

   



[facility]. ElizabethAutomated blood hematocrit (volume fraction)2020 
04:30:00





             Test Item    Value        Reference Range Interpretation Comments

 

             Hematocrit (test code = 4544-3) 36.5 %                             

    



[facility]Autom[facility] erythrocyte mean corpuscular volume (MCV) 
[id number] 04:30:00





             Test Item    Value        Reference Range Interpretation Comments

 

             Mean Corpuscular Volume (test code = 99 fL                         

         



             787-2)                                              



[facility]. ElizabethAutomated erythrocyte mean corpuscular hemoglobin (mass 
per erythrocyte)2020 04:30:00





             Test Item    Value        Reference Range Interpretation Comments

 

             Mean Corpuscular Hemoglobin (test 32.2 pg                          

      



             code = 785-6)                                        



[facility]. ElizabethAutomated erythrocyte mean corpuscular hemoglobin 
concentration measurement (mass/[id number] 04:30:00





             Test Item    Value        Reference Range Interpretation Comments

 

             Mean Corpuscular Hemoglobin Concent 32.6 g/dL                      

        



             (test code = 786-4)                                        



[facility]. ElizabethAutomated erythrocyte distribution width [id number]
 04:30:00





             Test Item    Value        Reference Range Interpretation Comments

 

             Red Cell Distribution Width (test code 13.6 %                      

           



             = 788-0)                                            



[facility]. ElizabethAutomated blood platelet count (count/volume)2020 
04:30:00





             Test Item    Value        Reference Range Interpretation Comments

 

             Platelet Count (test code = 283 10*3/uL                            



             777-3)                                              



[facility]. ElizabethAutomated blood platelet mean volume measurement
2020 04:30:00





             Test Item    Value        Reference Range Interpretation Comments

 

             Mean Platelet Volume (test code = 10.4                             

      



             35833-7)                                            



[facility] ElizabethAutomated blood neutrophil count as percentage of total 
[id number] 04:30:00





             Test Item    Value        Reference Range Interpretation Comments

 

             Neutrophils (%) (Auto) (test code = 60 %                           

        



             770-8)                                              



[facility] ElizabethAutomated blood immature granulocyte count as percentage
 of total tspirxblcc7599-31-03 04:30:00





             Test Item    Value        Reference Range Interpretation Comments

 

             Immature Granulocyte % (Auto) (test 1 %                            

        



             code = 52888-1)                                        



[facility] ElizabethAutomated blood lymphocyte count as percentage of total 
eofzvuaflq4701-94-85 04:30:00





             Test Item    Value        Reference Range Interpretation Comments

 

             Lymphocytes (%) (Auto) (test code = 29 %                           

        



             736-9)                                              



[facility] ElizabethAutomated blood monocyte count as percentage of total 
fovforhrom3060-14-07 04:30:00





             Test Item    Value        Reference Range Interpretation Comments

 

             Monocytes (%) (Auto) (test code = 9 %                              

      



             5905-5)                                             



[facility] ElizabethAutomated blood eosinophil count as percentage of total 
gqopfbprre4747-77-31 04:30:00





             Test Item    Value        Reference Range Interpretation Comments

 

             Eosinophils (%) (Auto) (test code = 1 %                            

        



             713-8)                                              



[facility] ElizabethAutomated blood basophil count as percentage of total 
ejvtrotepw8022-67-90 04:30:00





             Test Item    Value        Reference Range Interpretation Comments

 

             Basophils (%) (Auto) (test code = 1 %                              

      



             706-2)                                              



[facility] ElizabethAutomated blood nucleated erythrocyte count as 
percentage of total xxxbibgolm0134-83-93 04:30:00





             Test Item    Value        Reference Range Interpretation Comments

 

             Nucleated Red Blood Cells % (test code 0.0 %                       

           



             = 99768-6)                                          



[facility] ElizabethAutomated blood neutrophil count (number/volume)
2020 04:30:00





             Test Item    Value        Reference Range Interpretation Comments

 

             Neutrophils # (Auto) (test code = 4.4 10*3/uL                      

      



             751-8)                                              



[facility] ElizabethAutomated blood immature granulocyte count as percentage
 of total ytehoycgik0478-05-63 04:30:00





             Test Item    Value        Reference Range Interpretation Comments

 

             Immature Granulocyte # (Auto) 0.1 10*3/uL                          

  



             (test code = 48410-4)                                        



[facility] ElizabethAutomated blood lymphocyte count (number/volume)
2020 04:30:00





             Test Item    Value        Reference Range Interpretation Comments

 

             Lymphocytes # (Auto) (test code = 2.1 10*3/uL                      

      



             731-0)                                              



[facility] ElizabethBlood monocytes automated count (number/volume)
2020 04:30:00





             Test Item    Value        Reference Range Interpretation Comments

 

             Monocytes # (Auto) (test code = 0.6 10*3/uL                        

    



             742-7)                                              



[facility]. ElizabethAutomated blood eosinophil [id number] 04:30:00





             Test Item    Value        Reference Range Interpretation Comments

 

             Eosinophils # (Auto) (test code = 0.1 10*3/uL                      

      



             711-2)                                              



[facility]. ElizabethAutomated blood basophil count (number/volume)2020
 04:30:00





             Test Item    Value        Reference Range Interpretation Comments

 

             Basophils # (Auto) (test code = 0.1 10*3/uL                        

    



             704-7)                                              



[facility] ElizabethAutomated blood nucleated erythrocyte count 
(count/volume)2020 04:30:00





             Test Item    Value        Reference Range Interpretation Comments

 

             Nucleated Red Blood Cells # 0.00 10*3/uL                           



             (test code = 771-6)                                        



[facility]. ElizabethService comment [id number] 04:30:00





             Test Item    Value        Reference Range Interpretation Comments

 

             Manual Differential (test code = Not Ind                           

     



             8265-1)                                             



[facility] ElizabethSerum or plasma sodium measurement (moles/volume)
2020 04:30:00





             Test Item    Value        Reference Range Interpretation Comments

 

             Sodium Level (test code = 2951-2) 133 mmol/L                       

      



[facility] ElizabethSerum or plasma potassium measurement (moles/volume)
2020 04:30:00





             Test Item    Value        Reference Range Interpretation Comments

 

             Potassium Level (test code = 4.9 mmol/L                            

 



             2823-3)                                             



[facility] ElizabethSerum or plasma chloride measurement (moles/volume)
2020 04:30:00





             Test Item    Value        Reference Range Interpretation Comments

 

             Chloride Level (test code = 101 mmol/L                             



             2075-0)                                             



[facility] ElizabethSerum or plasma total carbon dioxide measurement 
(moles/volume)2020 04:30:00





             Test Item    Value        Reference Range Interpretation Comments

 

             Carbon Dioxide Level (test code = 24 mmol/L                        

      



             2028)                                             



[facility]. ElizabethSerum or plasma anion gap determination (moles/volume)
2020 04:30:00





             Test Item    Value        Reference Range Interpretation Comments

 

             Anion Gap (test code = 13687-6) 13                                 

    



[facility] ElizabethSerum or plasma urea nitrogen measurement (mass/volume)
2020 04:30:00





             Test Item    Value        Reference Range Interpretation Comments

 

             Blood Urea Nitrogen (test code = 10 mg/dL                          

     



             3094-0)                                             



[facility]Land[facility] Centererum or plasma creatinine measurement (mass/volume)
2020 04:30:00





             Test Item    Value        Reference Range Interpretation Comments

 

             Creatinine (test code = 2160-0) 0.8 mg/dL                          

    



[facility] [id number] 04:30:00





             Test Item    Value        Reference Range Interpretation Comments

 

             Estimat Glomerular Filtration Rate 109                             

       



             (test code = 57236-7)                                        



[facility]. ElizabethSerum or plasma glucose measurement (mass/volume)
2020 04:30:00





             Test Item    Value        Reference Range Interpretation Comments

 

             Glucose Level (test code = 2345-7) 349 mg/dL                       

       



[facility] Centererum or plasma calcium measurement (mass/volume)
2020 04:30:00





             Test Item    Value        Reference Range Interpretation Comments

 

             Calcium Level (test code = 82865-9) 8.2 mg/dL                      

        



[facility] ElibethSerum or plasma beta hydroxybutyrate measurement 
(moles/volume)2020 04:00:00





             Test Item    Value        Reference Range Interpretation Comments

 

             Beta-Hydroxybutyric Acid (test 0.40 mmol/L                         

   



             code = 6873-4)                                        



[facility] HospitalDetermination of inhaled oxygen concentration (volume 
fraction)2020 04:03:00





             Test Item    Value        Reference Range Interpretation Comments

 

             FiO2 (test code = 3150-0) 21 %                                   



[facility] HospitalArterial blood pH iorpnyforfp7361-73-26 04:03:00





             Test Item    Value        Reference Range Interpretation Comments

 

             Arterial Blood pH (test code = 2744-1) 7.379                       

           



[facility] HospitalAr[facility]ial blood partial pressure of carbon dioxide
2020 04:03:00





             Test Item    Value        Reference Range Interpretation Comments

 

             Arterial Blood Partial Pressure 32.1 mm[Hg]                        

    



             CO2 (test code = 2019-8)                                        



[facility] ElizabethArterial blood partial pressure of oxygen measurement
2020 04:03:00





             Test Item    Value        Reference Range Interpretation Comments

 

             Arterial Blood Partial Pressure 86.6 mm[Hg]                        

    



             O2 (test code = 2703-7)                                        



[facility] ElizabethArterial blood bicarbonate measurement (moles/volume)
2020 04:03:00





             Test Item    Value        Reference Range Interpretation Comments

 

             Arterial Blood HCO3 (test code = 18.5 mmol/L                       

     



             -)                                             



[facility] ElizabethArterial blood base excess determination by calculation 
(moles/volume)2020 04:03:00





             Test Item    Value        Reference Range Interpretation Comments

 

             Arterial Blood Base Excess (test -5.6 mmol/L                       

     



             code = 1925-7)                                        



[facility]Po[facility] HospitalAr[facility] hemoglobin measurement by oximetry 
(mass/volume)2020 04:03:00





             Test Item    Value        Reference Range Interpretation Comments

 

             Arterial Blood Hemoglobin (test 12.7 g/dL                          

    



             code = 60825-7)                                        



[facility] ElibethArterial blood oxygen saturation qzziqtkdssq7383-05-30 
04:03:00





             Test Item    Value        Reference Range Interpretation Comments

 

             Arterial Blood Oxygen Saturation (test 96.5 %                      

           



             code = 2708-6)                                        



[facility] ElizabethArterial blood carboxyhemoglobin/total hemoglobin ratio 
(mass fraction)2020 04:03:00





             Test Item    Value        Reference Range Interpretation Comments

 

             Arterial Blood Carboxyhemoglobin (test 1.2 %                       

           



             code = 2030-5)                                        



[facility] ElizabethArterial blood methemoglobin/total hemoglobin ratio 
(mass fraction)2020 04:03:00





             Test Item    Value        Reference Range Interpretation Comments

 

             Arterial Blood Methemoglobin (test code 0.3 %                      

            



             = 2615-3)                                           



[facility] ElizabethArterial blood oxyhemoglobin/total hemoglobin ratio 
(mass fraction)2020 04:03:00





             Test Item    Value        Reference Range Interpretation Comments

 

             Arterial Blood Oxyhemoglobin (test 95.1 %                          

       



             code = 2714-4)                                        



[facility] ElibethArterial blood deoxyhemoglobin/total hemoglobin mass 
[id number] 04:03:00





             Test Item    Value        Reference Range Interpretation Comments

 

             Reduced Hemoglobin (test code = 3.4 %                              

    



             34541-2)                                            



[facility] HospitalArterial blood oxygen content by jommicpvsfi2807-66-02 
04:03:00





             Test Item    Value        Reference Range Interpretation Comments

 

             Arterial Blood Oxygen Content 17.1 mL/dL                           

  



             (test code = 63795-5)                                        



[facility] HospitalGas deliv source [id number] 04:03:00





             Test Item    Value        Reference Range Interpretation Comments

 

             Oxygen Delivery Device (test code = ROOM AIR                       

        



             36980-1)                                            



[facility] Eotsncy2253-63-03 04:03:00





             Test Item    Value        Reference Range Interpretation Comments

 

             Blood Gas Puncture Site (test Right Radial                         

  



             code = 66278-1)                                        



[facility] HospitalAssessment of wrist artery patency prior to arterial 
ckqaicpb6655-10-34 04:03:00





             Test Item    Value        Reference Range Interpretation Comments

 

             [place] Test (test code = 08462-8) Pass                              

     



[facility] Hospitalerum or plasma magnesium measurement (mass/volume)
2020 03:31:00





             Test Item    Value        Reference Range Interpretation Comments

 

             Magnesium Level (test code = 1.71 mg/dL                            

 



             26785-1)                                            



[facility] HospitalHgb A1c r [id number] 03:30:00





             Test Item    Value        Reference Range Interpretation Comments

 

             Hemoglobin A1c (test code = 4548-4) 12.3 %                         

        



[facility] HospitalOsmolality ser/isyw4594-74-97 00:23:00





             Test Item    Value        Reference Range Interpretation Comments

 

             Serum Osmolality (test code = 313 mosm/kg                          

  



             2692-2)                                             



[facility] (moles/volume)
2020 23:32:00





             Test Item    Value        Reference Range Interpretation Comments

 

             Bedside Lactic Acid Venous (test 1.95 mmol/L                       

     



             code = 2519-7)                                        



[facility] HospitalUrinalysis specimen collection [id number] 21:50:00





             Test Item    Value        Reference Range Interpretation Comments

 

             Urine Source (test code = 19159-3) URINE                           

       



[facility] HospitalColor of Urine by [id number] 21:50:00





             Test Item    Value        Reference Range Interpretation Comments

 

             Urine Color (test code = 72446-2) Colorless                        

      



[facility] ElizabethU[facility] clarity blllbtesqznvg6986-23-19 21:50:00





             Test Item    Value        Reference Range Interpretation Comments

 

             Urine Appearance (test code = 55396-0) Clear                       

           



[facility] by automated test [id number] 
21:50:00





             Test Item    Value        Reference Range Interpretation Comments

 

             Urine pH (test code = 32770-0) 6.0                                 

   



[facility] ElizabethSpecific gravity of Urine by Automated test strip
2020 21:50:00





             Test Item    Value        Reference Range Interpretation Comments

 

             Urine Specific Gravity (test code = 1.030                          

        



             46060-7)                                            



[facility] ElizabethUrine protein measurement by automated test strip 
(mass/volume)2020 21:50:00





             Test Item    Value        Reference Range Interpretation Comments

 

             Urine Protein (test code = 50587-0) 30 mg/dL                       

        



[facility] glucose measurement by automated test strip 
(mass/volume)2020 21:50:00





             Test Item    Value        Reference Range Interpretation Comments

 

             Urine Glucose (UA) (test code = >1000 mg/dL                        

    



             21957-6)                                            



[facility] ketones measurement by automated test strip 
(mass/volume)2020 21:50:00





             Test Item    Value        Reference Range Interpretation Comments

 

             Urine Ketones (test code = 01115-1) 60 mg/dL                       

        



[facility] erythrocytes count by automated test strip 
(number/volume)2020 21:50:00





             Test Item    Value        Reference Range Interpretation Comments

 

             Urine Occult Blood (test code = 1+                                 

    



             09005-2)                                            



[facility] ElizabethUrine nitrite detection by automated test strip
2020 21:50:00





             Test Item    Value        Reference Range Interpretation Comments

 

             Urine Nitrite (test code = 20451-1) Negative                       

        



[facility] ElizabethUrine total bilirubin measurement by automated test 
strip (mass/volume)2020 21:50:00





             Test Item    Value        Reference Range Interpretation Comments

 

             Urine Bilirubin (test code = Negative mg/dL                        

   



             20598-3)                                            



[facility] ElizabethUrine urobilinogen measurement by automated test strip 
(mass/volume)2020 21:50:00





             Test Item    Value        Reference Range Interpretation Comments

 

             Urine Urobilinogen (test code Negative mg/dL                       

    



             = 59579-3)                                          



[facility]. ElidimitrisUrine leukocytes count by automated test strip 
(number/volume)2020 21:50:00





             Test Item    Value        Reference Range Interpretation Comments

 

             Urine Leukocyte Esterase Negative {Vika}/uL                         

  



             (test code = 55810-8)                                        



[facility] . ElibethMicroscopic examination of [id number] 21:50:00





             Test Item    Value        Reference Range Interpretation Comments

 

             Microscopic Urinalysis (T) (test code = -----                      

            



             75137-8)                                            



[facility] sediment erythrocyte count by microscopy 
(number/high power field)2020 21:50:00





             Test Item    Value        Reference Range Interpretation Comments

 

             Urine RBC (test code = 31511-0) 11-30 /[HPF]                       

    



[facility]. [facility] sediment leukocyte count by microscopy 
(number/high power field)2020 21:50:00





             Test Item    Value        Reference Range Interpretation Comments

 

             Urine WBC (test code = 5821-4) 0-5 /[HPF]                          

   



[facility] sediment epithelial cell count by microscopy 
(number/high power field)2020 21:50:00





             Test Item    Value        Reference Range Interpretation Comments

 

             Urine Epithelial Cells (test None Seen /[HPF]                      

     



             code = 5787-7)                                        



[facility] sediment crystal count by microscopy (number/high 
power field)2020 21:50:00





             Test Item    Value        Reference Range Interpretation Comments

 

             Urine Crystals (test code = None Seen /[HPF]                       

    



             15035-4)                                            



[facility] sediment bacteria count by microscopy (number/high
 power field)2020 21:50:00





             Test Item    Value        Reference Range Interpretation Comments

 

             Urine Bacteria (test code = None Seen /[HPF]                       

    



             5769-5)                                             



[facility] sediment casts count by microscopy (number/low 
power field)2020 21:50:00





             Test Item    Value        Reference Range Interpretation Comments

 

             Urine Casts (test code = Present /[LPF]                           



             9842-6)                                             



[facility] ElizabethUrine sediment hyaline cast count by microscopy 
(number/low power field)2020 21:50:00





             Test Item    Value        Reference Range Interpretation Comments

 

             Urine Hyaline Casts (test code = 0-1 /[LPF]                        

     



             5796-8)                                             



[facility] ElizabethYeast detection in urine sediment by light microscopy
2020 21:50:00





             Test Item    Value        Reference Range Interpretation Comments

 

             Urine Yeast (test code = None Seen /[HPF]                          

 



             65055-6)                                            



[facility] ElizabethService comment  21:50:00





             Test Item    Value        Reference Range Interpretation Comments

 

             Urinalysis Comment (test code = 8262-8) *                          

            



[facility] ElizabeAbnerervice comment  21:50:00





             Test Item    Value        Reference Range Interpretation Comments

 

             Urine Culture Indicated (test code = Not Ind                       

         



             8264-4)                                             



[facility]abeUniversity [facility] Centerous whole blood pH qbfiyiukvkm9475-29-46 21:31:00





             Test Item    Value        Reference Range Interpretation Comments

 

             Venous Blood pH (test code = 2746-6) 7.326                         

         



[facility] ElizabeVenous blood partial pressure of carbon dioxide 
yaothdpflrm8981-57-10 21:31:00





             Test Item    Value        Reference Range Interpretation Comments

 

             Blood Gas PCO2 (test code = 36.9 mm[Hg]                            



             -4)                                             



[facility]abeUniversity [facility] Centerous blood partial pressure of oxygen measurement
2020 21:31:00





             Test Item    Value        Reference Range Interpretation Comments

 

             Blood Gas PO2 (test code = 25.1 mm[Hg]                            



             2705-2)                                             



[facility]. [facility] b[facility]ate [facility] (moles/volume)
2020 21:31:00





             Test Item    Value        Reference Range Interpretation Comments

 

             Venous Blood HCO3 (test code = 18.8 mmol/L                         

   



             22216-7)                                            



[facility] whole blood base excess determination by 
calculation (moles/volume)2020 21:31:00





             Test Item    Value        Reference Range Interpretation Comments

 

             Venous Blood Base Excess (test -6.4 mmol/L                         

   



             code = 1927-3)                                        



[facility] by [facility]etry (mass/volume)
2020 21:31:00





             Test Item    Value        Reference Range Interpretation Comments

 

             Venous Blood Hemoglobin (test code 14.6 g/dL                       

       



             = 10920-0)                                          



[facility] ([facility] fraction)2020
 21:31:00





             Test Item    Value        Reference Range Interpretation Comments

 

             Venous Blood Oxygen Saturation (test 50.7 %                        

         



             code = 2711-0)                                        



[facility] carboxyhemoglobin/total hemoglobin ratio
2020 21:31:00





             Test Item    Value        Reference Range Interpretation Comments

 

             Venous Blood Carboxyhemoglobin (test 2.5 %                         

         



             code = 2032-1)                                        



[facility] whole blood methemoglobin/total hemoglobin (mass 
fraction)2020 21:31:00





             Test Item    Value        Reference Range Interpretation Comments

 

             Venous Blood Methemoglobin (test code = 0.3 %                      

            



             2617-9)                                             



[facility] blood carboxyhemoglobin/total hemoglobin ratio
2020 21:31:00





             Test Item    Value        Reference Range Interpretation Comments

 

             Venous Blood Oxyhemoglobin (test code 49.3 %                       

          



             = 2-1)                                           



[facility] blood deoxyhemoglobin/total hemoglobin mass ratio
2020 21:31:00





             Test Item    Value        Reference Range Interpretation Comments

 

             Reduced Hemoglobin (test code = 47.9 %                             

    



             07191-6)                                            



[facility] blood oxygen content by [id number] 
21:31:00





             Test Item    Value        Reference Range Interpretation Comments

 

             Venous Blood Oxygen Content (test 10.1 mL/dL                       

      



             code = 05339-2)                                        



[facility] deliv source [id number] 21:31:00





             Test Item    Value        Reference Range Interpretation Comments

 

             Oxygen Delivery Device (test code = ROOM AIR                       

        



             88251-7)                                            



[facility] [id number] 21:31:00





             Test Item    Value        Reference Range Interpretation Comments

 

             Blood Gas Puncture Site (test code = Venous                        

         



             77753-8)                                            



[facility] patency prior to arterial 
[id number] 21:31:00





             Test Item    Value        Reference Range Interpretation Comments

 

             [place] Test (test code = Not Applicable                           



             28599-8)                                            



[facility] 03 [id number] 21:31:00





             Test Item    Value        Reference Range Interpretation Comments

 

             Blood Gas Critical Value Called To dr cardona                       

       



             (test code = 8264-4)                                        



[facility] 04 [id number] 21:31:00





             Test Item    Value        Reference Range Interpretation Comments

 

             Blood Gas Notified Time (test [id number]                       

    



             code = 8265-1)                                        



[facility]. [facility] or plasma total bilirubin measurement 
(mass/volume)2020 20:35:00





             Test Item    Value        Reference Range Interpretation Comments

 

             Total Bilirubin (test code = 0.5 mg/dL                             

 



             -2)                                             



[facility]. [facility] or plasma aspartate aminotransferase measurement 
(enzymatic activity/volume)2020 20:35:00





             Test Item    Value        Reference Range Interpretation Comments

 

             Aspartate Amino Transf (AST/SGOT) 14 U/L                           

      



             (test code = 1920-8)                                        



[facility] or plasma alanine aminotransferase measurement 
(enzymatic activity/volume)2020 20:35:00





             Test Item    Value        Reference Range Interpretation Comments

 

             Alanine Aminotransferase (ALT/SGPT) 55 U/L                         

        



             (test code = 1742-6)                                        



[facility]. [facility] or plasma protein measurement (mass/volume)
2020 20:35:00





             Test Item    Value        Reference Range Interpretation Comments

 

             Total Protein (test code = 2885-2) 6.6 g/dL                        

       



[facility] or plasma albumin measurement (mass/volume)
2020 20:35:00





             Test Item    Value        Reference Range Interpretation Comments

 

             Albumin (test code = 1751-7) 4.0 g/dL                              

 



[facility] or plasma alkaline phosphatase measurement 
(enzymatic activity/volume)2020 20:35:00





             Test Item    Value        Reference Range Interpretation Comments

 

             Alkaline Phosphatase (test code = 257 U/L                          

      



             6768-6)                                             



[facility]. [facility] by glucometer 
(mass/volume)2020 11:40:00





             Test Item    Value        Reference Range Interpretation Comments

 

             Bedside Glucose (test code = 368 mg/dL                             

 



             34810-2)                                            



[facility] sodium [facility] (moles/volume)
2020 05:00:00





             Test Item    Value        Reference Range Interpretation Comments

 

             Sodium Level (test code = 2951-2) 127 mmol/L                       

      



[facility] or plasma potassium measurement (moles/volume)
2020 05:00:00





             Test Item    Value        Reference Range Interpretation Comments

 

             Potassium Level (test code = 4.2 mmol/L                            

 



             2823-3)                                             



[facility] or plasma chloride measurement (moles/volume)
2020 05:00:00





             Test Item    Value        Reference Range Interpretation Comments

 

             Chloride Level (test code = 2075-0) 98 mmol/L                      

        



[facility] or plasma total carbon dioxide measurement 
(moles/volume)2020 05:00:00





             Test Item    Value        Reference Range Interpretation Comments

 

             Carbon Dioxide Level (test code = 20 mmol/L                        

      



             -9)                                             



[facility] or plasma anion gap determination (moles/volume)
2020 05:00:00





             Test Item    Value        Reference Range Interpretation Comments

 

             Anion Gap (test code = 33393-2) 13                                 

    



[facility] or plasma urea nitrogen measurement (mass/volume)
2020 05:00:00





             Test Item    Value        Reference Range Interpretation Comments

 

             Blood Urea Nitrogen (test code = 9 mg/dL                           

     



             3094-0)                                             



[facility] or plasma creatinine measurement (mass/volume)
2020 05:00:00





             Test Item    Value        Reference Range Interpretation Comments

 

             Creatinine (test code = 2160-0) 0.7 mg/dL                          

    



[facility]. ElizabethGFR estimate [id number] 05:00:00





             Test Item    Value        Reference Range Interpretation Comments

 

             Estimat Glomerular Filtration Rate 127                             

       



             (test code = 31701-3)                                        



[facility] or plasma glucose measurement (mass/volume)
2020 05:00:00





             Test Item    Value        Reference Range Interpretation Comments

 

             Glucose Level (test code = 2345-7) 415 mg/dL                       

       



[facility] or plasma calcium measurement (mass/volume)
2020 05:00:00





             Test Item    Value        Reference Range Interpretation Comments

 

             Calcium Level (test code = 97823-2) 7.9 mg/dL                      

        



[facility]. ElizabethUrine methamphetamine screen2020-01-15 05:00:00





             Test Item    Value        Reference Range Interpretation Comments

 

             Urine Methamphetamines Screen Negative ng/mL                       

    



             (test code = 18996-1)                                        



[facility]. ElizabethUrine propoxyphene screening test2020-01-15 05:00:00





             Test Item    Value        Reference Range Interpretation Comments

 

             Urine Propoxyphene Screen Negative ng/mL                           



             (test code = 12580-8)                                        



[facility]. ElizabethUrine amphetamines detection by screening method
2020-01-15 05:00:00





             Test Item    Value        Reference Range Interpretation Comments

 

             Urine Amphetamines Screen Negative ng/mL                           



             (test code = 08346-2)                                        



[facility]. ElizabethUrine buprenorphine screen with reflex confirmation
2020-01-15 05:00:00





             Test Item    Value        Reference Range Interpretation Comments

 

             Urine Buprenorphine (test code Negative ng/mL                      

     



             = 3414-0)                                           



[facility] barbiturates detection by screening method
2020-01-15 05:00:00





             Test Item    Value        Reference Range Interpretation Comments

 

             Urine Barbiturates Screen Negative ng/mL                           



             (test code = 26283-1)                                        



[facility]. ElizabethUrine benzodiazepines detection by screening method
2020-01-15 05:00:00





             Test Item    Value        Reference Range Interpretation Comments

 

             Urine Benzodiazepines Screen Negative ng/mL                        

   



             (test code = 84365-0)                                        



[facility] ElizabethUrine benzoylecgonine detection by screening method
2020-01-15 05:00:00





             Test Item    Value        Reference Range Interpretation Comments

 

             Urine Cocaine Screen (test Negative ng/mL                          

 



             code = 89868-0)                                        



[facility]. ElizabethUrine methadone screen2020-01-15 05:00:00





             Test Item    Value        Reference Range Interpretation Comments

 

             Urine Methadone, Qualitative Negative ng/mL                        

   



             (test code = 19550-3)                                        



[facility]. ElizabethUrine opiates screening test2020-01-15 05:00:00





             Test Item    Value        Reference Range Interpretation Comments

 

             Urine Opiates Screen (test Positive ng/mL                          

 



             code = 59573-9)                                        



[facility]. ElizabethUrine phencyclidine detection by screening method
2020-01-15 05:00:00





             Test Item    Value        Reference Range Interpretation Comments

 

             Urine Phencyclidine Screen Negative ng/mL                          

 



             (test code = 38614-8)                                        



[facility] by [facility] method
2020-01-15 05:00:00





             Test Item    Value        Reference Range Interpretation Comments

 

             Urine Cannabinoids (test code Positive ng/mL                       

    



             = 46695-2)                                          



[facility] tricyclic antidepressants detection
2020-01-15 05:00:00





             Test Item    Value        Reference Range Interpretation Comments

 

             Ur Tricyclic Antidepressants Negative ng/mL                        

   



             Screen (test code = 97619-9)                                       

 



[facility] oxy[facility]one detection by screening method2020-01-15 
05:00:00





             Test Item    Value        Reference Range Interpretation Comments

 

             Urine Oxycodone Screen (test Negative ng/mL                        

   



             code = 67015-3)                                        



[facility] of U[facility] by Automated test strip
2020-01-15 05:00:00





             Test Item    Value        Reference Range Interpretation Comments

 

             Urine Specific Gravity (test code = 1.015                          

        



             39146-4)                                            



[facility] pH measurement by automated test strip2020-01-15 
05:00:00





             Test Item    Value        Reference Range Interpretation Comments

 

             Urine pH (test code = 92172-7) 6.0                                 

   



[facility] drug screen comment [id number] 
05:00:00





             Test Item    Value        Reference Range Interpretation Comments

 

             Urine Drug Screen Comment (test code See Note                      

         



             = 45736-5)                                          



[facility] urine culture2020-01-15 05:00:00





             Test Item    Value        Reference Range Interpretation Comments

 

             Urine Culture (test Enterococcus faecalis                          

 



             code = 630-4)                                        



[facility] methamphetamine screen2020-01-15 05:00:00





             Test Item    Value        Reference Range Interpretation Comments

 

             Urine Methamphetamines Screen Negative ng/mL                       

    



             (test code = 10886-6)                                        



[facility] propoxyphene [facility] test2020-01-15 05:00:00





             Test Item    Value        Reference Range Interpretation Comments

 

             Urine Propoxyphene Screen Negative ng/mL                           



             (test code = 08343-4)                                        



[facility] amphetamines detection by screening method
2020-01-15 05:00:00





             Test Item    Value        Reference Range Interpretation Comments

 

             Urine Amphetamines Screen Negative ng/mL                           



             (test code = 01784-6)                                        



[facility] buprenorphine screen with reflex confirmation
2020-01-15 05:00:00





             Test Item    Value        Reference Range Interpretation Comments

 

             Urine Buprenorphine (test code Negative ng/mL                      

     



             = 3414-0)                                           



[facility]. ElizabeUrine barbiturates detection by screening method
2020-01-15 05:00:00





             Test Item    Value        Reference Range Interpretation Comments

 

             Urine Barbiturates Screen Negative ng/mL                           



             (test code = 18053-3)                                        



[facility]. ElizabethUrine benzodiazepines detection by screening method
2020-01-15 05:00:00





             Test Item    Value        Reference Range Interpretation Comments

 

             Urine Benzodiazepines Screen Negative ng/mL                        

   



             (test code = 81211-4)                                        



[facility] by screening method
2020-01-15 05:00:00





             Test Item    Value        Reference Range Interpretation Comments

 

             Urine Cocaine Screen (test Negative ng/mL                          

 



             code = 39034-8)                                        



[facility] methadone screen2020-01-15 05:00:00





             Test Item    Value        Reference Range Interpretation Comments

 

             Urine Methadone, Qualitative Negative ng/mL                        

   



             (test code = 19550-3)                                        



[facility] 05:00:00





             Test Item    Value        Reference Range Interpretation Comments

 

             Urine Opiates Screen (test Positive ng/mL                          

 



             code = 81388-3)                                        



[facility] phen[facility]idine detection by screening method
2020-01-15 05:00:00





             Test Item    Value        Reference Range Interpretation Comments

 

             Urine Phencyclidine Screen Negative ng/mL                          

 



             (test code = 28322-3)                                        



[facility]
2020-01-15 05:00:00





             Test Item    Value        Reference Range Interpretation Comments

 

             Urine Cannabinoids (test code Positive ng/mL                       

    



             = 46114-9)                                          



[facility]. ElizabethScreening urine tricyclic antidepressants detection
2020-01-15 05:00:00





             Test Item    Value        Reference Range Interpretation Comments

 

             Ur Tricyclic Antidepressants Negative ng/mL                        

   



             Screen (test code = 26710-6)                                       

 



[facility]. ElizabethUrine oxycodone detection by screening method2020-01-15 
05:00:00





             Test Item    Value        Reference Range Interpretation Comments

 

             Urine Oxycodone Screen (test Negative ng/mL                        

   



             code = 18862-7)                                        



[facility] Elizabeth[facility] of Urine by Automated test strip
2020-01-15 05:00:00





             Test Item    Value        Reference Range Interpretation Comments

 

             Urine Specific Gravity (test code = 1.015                          

        



             01766-1)                                            



[facility] pH measurement by automated test strip2020-01-15 
05:00:00





             Test Item    Value        Reference Range Interpretation Comments

 

             Urine pH (test code = 17284-8) 6.0                                 

   



[facility] drug screen comment [id number] 
05:00:00





             Test Item    Value        Reference Range Interpretation Comments

 

             Urine Drug Screen Comment (test code See Note                      

         



             = 41586-2)                                          



[facility] urine culture2020-01-15 05:00:00





             Test Item    Value        Reference Range Interpretation Comments

 

             Urine Culture (test Enterococcus faecalis                          

 



             code = 630-4)                                        



[facility]. ElizabethHgb A1c MFr [id number] 04:00:00





             Test Item    Value        Reference Range Interpretation Comments

 

             Hemoglobin A1c (test code = 4548-4) 12.6 %                         

        



[facility] ElizabethVen[facility] blood lactic acid measurement (moles/volume)
2020 23:54:00





             Test Item    Value        Reference Range Interpretation Comments

 

             Bedside Lactic Acid Venous (test 1.01 mmol/L                       

     



             code = 2519-7)                                        



[facility] or plasma phosphate measurement (mass/volume)
2020 22:24:00





             Test Item    Value        Reference Range Interpretation Comments

 

             Phosphorus Level (test code = 2.1 mg/dL                            

  



             2777-1)                                             



[facility] or plasma magnesium measurement (mass/volume)
2020 22:24:00





             Test Item    Value        Reference Range Interpretation Comments

 

             Magnesium Level (test code = 1.59 mg/dL                            

 



             81407-8)                                            



[facility] or plasma thyrotropin measurement with detection 
limit of 0.005 mIU/L or less (units/volume)2020 22:24:00





             Test Item    Value        Reference Range Interpretation Comments

 

             Thyroid Stimulating Hormone 0.81 u[iU]/mL                          

 



             (TSH) (test code = 03507-5)                                        



[facility] or plasma phosphate measurement (mass/volume)
2020 22:24:00





             Test Item    Value        Reference Range Interpretation Comments

 

             Phosphorus Level (test code = 2.1 mg/dL                            

  



             2777-1)                                             



[facility] [facility] ElizabethSerum or plasma thyrotropin measurement with detection 
limit of 0.005 mIU/L or less (units/volume)2020 22:24:00





             Test Item    Value        Reference Range Interpretation Comments

 

             Thyroid Stimulating Hormone 0.81 u[iU]/mL                          

 



             (TSH) (test code = 99138-6)                                        



[facility]. Rose MariebeHepatitis B e ab ser/plas qual by [id number] 21:44:00





             Test Item    Value        Reference Range Interpretation Comments

 

             Lactic Acid Comment (test code Reprint/Called                      

     



             = Lactic Acid Comment)                                        



[facility]. Rose MariebeHepatitis B e ab ser/plas qual by [id number] 21:44:00





             Test Item    Value        Reference Range Interpretation Comments

 

             Lactic Acid Comment (test code Reprint/Called                      

     



             = Lactic Acid Comment)                                        



[facility] Rose MariebeAutomated blood leukocyte count (number/volume)
2020 19:34:00





             Test Item    Value        Reference Range Interpretation Comments

 

             White Blood Count (test code = 6.0 10*3/uL                         

   



             6690-2)                                             



[facility]. Rose MariebeBlood erythrocytes automated count (number/volume)
2020 19:34:00





             Test Item    Value        Reference Range Interpretation Comments

 

             Red Blood Count (test code = 3.62 10*6/uL                          

 



             789-8)                                              



[facility]. ElizabethBlood hemoglobin measurement (mass/volume)2020 
19:34:00





             Test Item    Value        Reference Range Interpretation Comments

 

             Hemoglobin (test code = 718-7) 11.7 g/dL                           

   



[facility]. ElizabethAutomated blood hematocrit (volume fraction)2020 
19:34:00





             Test Item    Value        Reference Range Interpretation Comments

 

             Hematocrit (test code = 4544-3) 33.9 %                             

    



[facility] - . ElizabethAutomated erythrocyte mean corpuscular volume (MCV) 
[id number] 19:34:00





             Test Item    Value        Reference Range Interpretation Comments

 

             Mean Corpuscular Volume (test code = 94 fL                         

         



             787-2)                                              



[facility] ElizabethAutomated erythrocyte mean corpuscular hemoglobin (mass 
per erythrocyte)2020 19:34:00





             Test Item    Value        Reference Range Interpretation Comments

 

             Mean Corpuscular Hemoglobin (test 32.3 pg                          

      



             code = 785-6)                                        



[facility] ElizabethAutomated erythrocyte mean corpuscular hemoglobin 
concentration measurement (mass/[id number] 19:34:00





             Test Item    Value        Reference Range Interpretation Comments

 

             Mean Corpuscular Hemoglobin Concent 34.5 g/dL                      

        



             (test code = 786-4)                                        



[facility] ElizabethAutomated erythrocyte distribution width [id number]
 19:34:00





             Test Item    Value        Reference Range Interpretation Comments

 

             Red Cell Distribution Width (test code 12.5 %                      

           



             = 788-0)                                            



[facility] ElizabethAutomated blood platelet count (count/volume)2020 
19:34:00





             Test Item    Value        Reference Range Interpretation Comments

 

             Platelet Count (test code = 298 10*3/uL                            



             777-3)                                              



[facility]. ElizabethAutomated blood platelet mean volume measurement
2020 19:34:00





             Test Item    Value        Reference Range Interpretation Comments

 

             Mean Platelet Volume (test code = 9.0                              

      



             02042-7)                                            



[facility] ElizabethSerum or plasma total bilirubin measurement 
(mass/volume)2020 19:34:00





             Test Item    Value        Reference Range Interpretation Comments

 

             Total Bilirubin (test code = 0.4 mg/dL                             

 



             1975-2)                                             



[facility] ElizabethSerum or plasma aspartate aminotransferase measurement 
(enzymatic activity/volume)2020 19:34:00





             Test Item    Value        Reference Range Interpretation Comments

 

             Aspartate Amino Transf (AST/SGOT) 33 U/L                           

      



             (test code = 1920-8)                                        



[facility] ElizabethSerum or plasma alanine aminotransferase measurement 
(enzymatic activity/volume)2020 19:34:00





             Test Item    Value        Reference Range Interpretation Comments

 

             Alanine Aminotransferase (ALT/SGPT) 37 U/L                         

        



             (test code = 1742-6)                                        



[facility] ElizabethSerum or plasma protein measurement (mass/volume)
2020 19:34:00





             Test Item    Value        Reference Range Interpretation Comments

 

             Total Protein (test code = 2885-2) 5.4 g/dL                        

       



[facility] or plasma albumin measurement (mass/volume)
2020 19:34:00





             Test Item    Value        Reference Range Interpretation Comments

 

             Albumin (test code = 1751-7) 3.2 g/dL                              

 



[facility] ElizabethSerum or plasma alkaline phosphatase measurement 
(enzymatic activity/volume)2020 19:34:00





             Test Item    Value        Reference Range Interpretation Comments

 

             Alkaline Phosphatase (test code = 124 U/L                          

      



             6768-6)                                             



[facility] ElizabethSerum or plasma lipase measurement (enzymatic 
activity/volume)2020 19:34:00





             Test Item    Value        Reference Range Interpretation Comments

 

             Lipase (test code = 3040-3) 19 U/L                                 



[facility] ElizabethSerum or plasma beta hydroxybutyrate measurement 
(moles/volume)2020 19:34:00





             Test Item    Value        Reference Range Interpretation Comments

 

             Beta-Hydroxybutyric Acid (test 6.79 mmol/L                         

   



             code = 6873-4)                                        



[facility] ElizabethSerum or plasma ethanol measurement (mass/volume)
2020 19:34:00





             Test Item    Value        Reference Range Interpretation Comments

 

             Ethyl Alcohol Level (test code = 111 mg/dL                         

     



             5643-2)                                             



[facility] ElizabethSerum or plasma lipase measurement (enzymatic 
activity/volume)2020 19:34:00





             Test Item    Value        Reference Range Interpretation Comments

 

             Lipase (test code = 3040-3) 19 U/L                                 



[facility]. ElizabethSerum or plasma ethanol measurement (mass/volume)
2020 19:34:00





             Test Item    Value        Reference Range Interpretation Comments

 

             Ethyl Alcohol Level (test code = 111 mg/dL                         

     



             5643-2)                                             



[facility] trop[facility] I measurement (mass/volume)
2020 19:30:00





             Test Item    Value        Reference Range Interpretation Comments

 

             Bedside Troponin I (test code = 0.01 ng/mL                         

    



             88271-0)                                            



[facility]. [facility] cardiac troponin I measurement (mass/volume)
2020 19:30:00





             Test Item    Value        Reference Range Interpretation Comments

 

             Bedside Troponin I (test code = 0.01 ng/mL                         

    



             29996-7)                                            



[facility] [id number] 19:24:00





             Test Item    Value        Reference Range Interpretation Comments

 

             Venous Blood pH (test code = 2746-6) 7.295                         

         



[facility] ElizabethVenous blood partial pressure of carbon dioxide 
[id number] 19:24:00





             Test Item    Value        Reference Range Interpretation Comments

 

             Blood Gas PCO2 (test code = 37.0 mm[Hg]                            



             -4)                                             



[facility] ElizabethVenous blood partial pressure of oxygen measurement
2020 19:24:00





             Test Item    Value        Reference Range Interpretation Comments

 

             Blood Gas PO2 (test code = 32.6 mm[Hg]                            



             2705-2)                                             



[facility] ElizabethVenous blood bicarbonate measurement (moles/volume)
2020 19:24:00





             Test Item    Value        Reference Range Interpretation Comments

 

             Venous Blood HCO3 (test code = 17.6 mmol/L                         

   



             12726-8)                                            



[facility] ElizabethVenous whole blood base excess determination by 
calculation (moles/volume)2020 19:24:00





             Test Item    Value        Reference Range Interpretation Comments

 

             Venous Blood Base Excess (test -8.2 mmol/L                         

   



             code = 1927-3)                                        



[facility]. ElizabethBlood hemoglobin measurement by oximetry (mass/volume)
2020 19:24:00





             Test Item    Value        Reference Range Interpretation Comments

 

             Venous Blood Hemoglobin (test code 12.4 g/dL                       

       



             = 06022-7)                                          



[facility]. ElizabethVenous blood oxygen saturation (mass fraction)2020
 19:24:00





             Test Item    Value        Reference Range Interpretation Comments

 

             Venous Blood Oxygen Saturation (test 62.0 %                        

         



             code = 2711-0)                                        



[facility] ElizabethVenous blood carboxyhemoglobin/total hemoglobin ratio
2020 19:24:00





             Test Item    Value        Reference Range Interpretation Comments

 

             Venous Blood Carboxyhemoglobin (test 3.3 %                         

         



             code = 2032-1)                                        



[facility] ElizabethVenous whole blood methemoglobin/total hemoglobin (mass 
fraction)2020 19:24:00





             Test Item    Value        Reference Range Interpretation Comments

 

             Venous Blood Methemoglobin (test code = 0.3 %                      

            



             2617-9)                                             



[facility]. ElizabethVenous blood carboxyhemoglobin/total hemoglobin ratio
2020 19:24:00





             Test Item    Value        Reference Range Interpretation Comments

 

             Venous Blood Oxyhemoglobin (test code 59.8 %                       

          



             = 2-1)                                           



[facility] blood deoxyhemoglobin/total hemoglobin mass ratio
2020 19:24:00





             Test Item    Value        Reference Range Interpretation Comments

 

             Reduced Hemoglobin (test code = 36.6 %                             

    



             45748-2)                                            



[facility] blood oxygen content by [id number] 
19:24:00





             Test Item    Value        Reference Range Interpretation Comments

 

             Venous Blood Oxygen Content (test 10.4 mL/dL                       

      



             code = 51563-1)                                        



[facility] deliv source [id number] 19:24:00





             Test Item    Value        Reference Range Interpretation Comments

 

             Oxygen Delivery Device (test code = ROOM AIR                       

        



             96431-0)                                            



[facility] [id number] 19:24:00





             Test Item    Value        Reference Range Interpretation Comments

 

             Blood Gas Puncture Site (test code = Venous                        

         



             95422-1)                                            



[facility] patency prior to arterial 
ofokburt0020-03-39 19:24:00





             Test Item    Value        Reference Range Interpretation Comments

 

             [place] Test (test code = Not Applicable                           



             17681-0)                                            



[facility]  [id number] 19:24:00





             Test Item    Value        Reference Range Interpretation Comments

 

             Blood Gas Critical Value Called To MD ZAY                      

       



             (test code = 8264-4)                                        



[facility] 04 [id number] 19:24:00





             Test Item    Value        Reference Range Interpretation Comments

 

             Blood Gas Notified Time (test [id number]                       

    



             code = 8265-1)                                        



[facility]  [id number] 19:24:00





             Test Item    Value        Reference Range Interpretation Comments

 

             N/A (test code = 8268-5) 1                                      



[facility]  [id number] 19:24:00





             Test Item    Value        Reference Range Interpretation Comments

 

             N/A (test code = 8268-5) 1                                      



[facility] ElizabethUrinalysis specimen collection [id number] 05:00:00





             Test Item    Value        Reference Range Interpretation Comments

 

             Urine Source (test code = 19159-3) URINE                           

       



[facility] by [id number] 05:00:00





             Test Item    Value        Reference Range Interpretation Comments

 

             Urine Color (test code = 78215-4) Lt Yellow                        

      



[facility] ElizabethU[facility] clarity [id number] 05:00:00





             Test Item    Value        Reference Range Interpretation Comments

 

             Urine Appearance (test code = 22605-0) Clear                       

           



[facility] Elizabeth[facility] measurement by automated test [id number] 
05:00:00





             Test Item    Value        Reference Range Interpretation Comments

 

             Urine pH (test code = 60239-7) 6.0                                 

   



[facility] ElizabethSpecific gravity of Urine by Automated test strip
2020 05:00:00





             Test Item    Value        Reference Range Interpretation Comments

 

             Urine Specific Gravity (test code = > 1.030                        

        



             04165-5)                                            



[facility] ElizabethUrine protein measurement by automated test strip 
(mass/volume)2020 05:00:00





             Test Item    Value        Reference Range Interpretation Comments

 

             Urine Protein (test code = 32823-3) 100 mg/dL                      

        



[facility] ElizabethUrine glucose measurement by automated test strip 
(mass/volume)2020 05:00:00





             Test Item    Value        Reference Range Interpretation Comments

 

             Urine Glucose (UA) (test code = >1000 mg/dL                        

    



             16318-7)                                            



[facility] ElizabethUrine ketones measurement by automated test strip 
(mass/volume)2020 05:00:00





             Test Item    Value        Reference Range Interpretation Comments

 

             Urine Ketones (test code = 72980-5) 60 mg/dL                       

        



[facility] ElizabethUrine erythrocytes count by automated test strip 
(number/volume)2020 05:00:00





             Test Item    Value        Reference Range Interpretation Comments

 

             Urine Occult Blood (test code = 2+                                 

    



             60763-3)                                            



[facility] ElizabethUrine nitrite detection by automated test strip
2020 05:00:00





             Test Item    Value        Reference Range Interpretation Comments

 

             Urine Nitrite (test code = 90171-0) Negative                       

        



[facility] ElizabethUrine total bilirubin measurement by automated test 
strip (mass/volume)2020 05:00:00





             Test Item    Value        Reference Range Interpretation Comments

 

             Urine Bilirubin (test code = Negative mg/dL                        

   



             93079-6)                                            



[facility] u[facility]ilinogen [facility] by automated test strip 
(mass/volume)2020 05:00:00





             Test Item    Value        Reference Range Interpretation Comments

 

             Urine Urobilinogen (test code Negative mg/dL                       

    



             = 14328-4)                                          



[facility]. ElibeUrine leukocytes count by automated test strip 
(number/volume)2020 05:00:00





             Test Item    Value        Reference Range Interpretation Comments

 

             Urine Leukocyte Esterase Negative {Vika}/uL                         

  



             (test code = 97441-1)                                        



[facility]. [facility] HospitalMicroscopic examination of [id number] 05:00:00





             Test Item    Value        Reference Range Interpretation Comments

 

             Microscopic Urinalysis (T) (test code = -----                      

            



             71436-2)                                            



[facility]. [facility] erythrocyte count by microscopy 
(number/high power field)2020 05:00:00





             Test Item    Value        Reference Range Interpretation Comments

 

             Urine RBC (test code = 11143-6) 11-30 /[HPF]                       

    



[facility]. [facility] leukocyte count by microscopy 
(number/high power field)2020 05:00:00





             Test Item    Value        Reference Range Interpretation Comments

 

             Urine WBC (test code = 5821-4) 0-5 /[HPF]                          

   



[facility]. [facility] epithelial cell count by microscopy 
(number/high power field)2020 05:00:00





             Test Item    Value        Reference Range Interpretation Comments

 

             Urine Epithelial Cells (test None Seen /[HPF]                      

     



             code = 5787-7)                                        



[facility] sediment crystal count by microscopy (number/high 
power field)2020 05:00:00





             Test Item    Value        Reference Range Interpretation Comments

 

             Urine Crystals (test code = None Seen /[HPF]                       

    



             35981-0)                                            



[facility] sediment bacteria count by microscopy (number/high
 power field)2020 05:00:00





             Test Item    Value        Reference Range Interpretation Comments

 

             Urine Bacteria (test code = None Seen /[HPF]                       

    



             5769-5)                                             



[facility]. ElizabethUrine sediment casts count by microscopy (number/low 
power field)2020 05:00:00





             Test Item    Value        Reference Range Interpretation Comments

 

             Urine Casts (test code = None Seen /[LPF]                          

 



             9842-6)                                             



[facility] Elizabeth[facility] sediment hyaline cast count by microscopy 
(number/low power field)2020 05:00:00





             Test Item    Value        Reference Range Interpretation Comments

 

             Urine Hyaline Casts (test None Seen /[LPF]                         

  



             code = 5796-8)                                        



[facility] - . ElizabethYeast detection in urine sediment by light microscopy
2020 05:00:00





             Test Item    Value        Reference Range Interpretation Comments

 

             Urine Yeast (test code = None Seen /[HPF]                          

 



             52158-6)                                            



[facility]Service comment  05:00:00





             Test Item    Value        Reference Range Interpretation Comments

 

             Urinalysis Comment (test code = 8262-8) *                          

            



[facility] ElizabethService comment  05:00:00





             Test Item    Value        Reference Range Interpretation Comments

 

             Urine Culture Indicated (test code = Not Ind                       

         



             8264-4)                                             



[facility] ElizabethCapillary whole blood glucose measurement by glucometer 
(mass/volume)2019 11:50:00





             Test Item    Value        Reference Range Interpretation Comments

 

             Bedside Glucose (test code = 131 mg/dL                             

 



             12489-0)                                            



[facility] Elizabeth[facility] or plasma sodium measurement (moles/volume)
2019 04:26:00





             Test Item    Value        Reference Range Interpretation Comments

 

             Sodium Level (test code = 2951-2) 134 mmol/L                       

      



[facility] or plasma potassium measurement (moles/volume)
2019 04:26:00





             Test Item    Value        Reference Range Interpretation Comments

 

             Potassium Level (test code = 4.0 mmol/L                            

 



             2823-3)                                             



[facility] or plasma chloride measurement (moles/volume)
2019 04:26:00





             Test Item    Value        Reference Range Interpretation Comments

 

             Chloride Level (test code = 103 mmol/L                             



             2075-0)                                             



[facility] total carbon dioxide measurement 
(moles/volume)2019 04:26:00





             Test Item    Value        Reference Range Interpretation Comments

 

             Carbon Dioxide Level (test code = 25 mmol/L                        

      



             -)                                             



[facility] anion gap determination (moles/volume)
2019 04:26:00





             Test Item    Value        Reference Range Interpretation Comments

 

             Anion Gap (test code = 66468-4) 10                                 

    



[facility] urea nitrogen [facility] (mass/volume)
2019 04:26:00





             Test Item    Value        Reference Range Interpretation Comments

 

             Blood Urea Nitrogen (test code = 8 mg/dL                           

     



             3094-0)                                             



[facility] creatinine measurement (mass/volume)
2019 04:26:00





             Test Item    Value        Reference Range Interpretation Comments

 

             Creatinine (test code = 2160-0) 0.8 mg/dL                          

    



[facility] ElizabethGFR estimate [id number] 04:26:00





             Test Item    Value        Reference Range Interpretation Comments

 

             Estimat Glomerular Filtration Rate 109                             

       



             (test code = 51981-4)                                        



[facility] glucose measurement (mass/volume)
2019 04:26:00





             Test Item    Value        Reference Range Interpretation Comments

 

             Glucose Level (test code = 2345-7) 116 mg/dL                       

       



[facility] plasma calcium measurement (mass/volume)
2019 04:26:00





             Test Item    Value        Reference Range Interpretation Comments

 

             Calcium Level (test code = 93140-9) 9.2 mg/dL                      

        



[facility] ElizabethAerobic bacterial wound [id number] 12:40:00





             Test Item    Value        Reference Range Interpretation Comments

 

             Wound Culture (test Streptococcus agalactiae                       

    



             code = 632-0)                                        



[facility] beta hydroxybutyrate measurement 
(moles/volume)2019 05:00:00





             Test Item    Value        Reference Range Interpretation Comments

 

             Beta-Hydroxybutyric Acid (test 7.72 mmol/L                         

   



             code = 6873-4)                                        



[facility] or plasma thyrotropin measurement with detection 
limit of 0.005 mIU/L or less (units/volume)2019 05:00:00





             Test Item    Value        Reference Range Interpretation Comments

 

             Thyroid Stimulating Hormone 1.19 u[iU]/mL                          

 



             (TSH) (test code = 00654-3)                                        



[facility] M[facility] [id number] 18:22:00





             Test Item    Value        Reference Range Interpretation Comments

 

             Hemoglobin A1c (test code = 4548-4) > 14.0 %                       

        



[facility] pnutvslsfvq4674-40-58 16:05:00





             Test Item    Value        Reference Range Interpretation Comments

 

             Venous Blood pH (test code = 2746-6) 7.287                         

         



[facility] blood partial pressure of carbon dioxide 
puagkmxabum8489-45-04 16:05:00





             Test Item    Value        Reference Range Interpretation Comments

 

             Blood Gas PCO2 (test code = 30.8 mm[Hg]                            



             -4)                                             



[facility] blood partial pressure of oxygen measurement
2019 16:05:00





             Test Item    Value        Reference Range Interpretation Comments

 

             Blood Gas PO2 (test code = 29.0 mm[Hg]                            



             5-2)                                             



[facility] blood bicarbonate measurement (moles/volume)
2019 16:05:00





             Test Item    Value        Reference Range Interpretation Comments

 

             Venous Blood HCO3 (test code = 14.4 mmol/L                         

   



             23662-3)                                            



[facility] whole blood base excess determination by 
calculation (moles/volume)2019 16:05:00





             Test Item    Value        Reference Range Interpretation Comments

 

             Venous Blood Base Excess (test -10.8 mmol/L                        

   



             code = 1927-3)                                        



[facility] deliv source [id number] 16:05:00





             Test Item    Value        Reference Range Interpretation Comments

 

             Oxygen Delivery Device (test code = ROOM AIR                       

        



             79373-1)                                            



[facility] [id number] 16:05:00





             Test Item    Value        Reference Range Interpretation Comments

 

             Blood Gas Puncture Site (test code = Venous                        

         



             58528-5)                                            



[facility] HospitalAssessment [facility] wrist artery patency prior to arterial 
[id number] 16:05:00





             Test Item    Value        Reference Range Interpretation Comments

 

             [place] Test (test code = Not Applicable                           



             93704-6)                                            



[facility] 03 [id number] 16:05:00





             Test Item    Value        Reference Range Interpretation Comments

 

             Blood Gas Critical Value Called To DR KELLER                         

       



             (test code = 8264-4)                                        



[facility] [id number] 16:05:00





             Test Item    Value        Reference Range Interpretation Comments

 

             Blood Gas Notified Time (test [id number]                       

    



             code = 8265-1)                                        



[facility] sodium [facility] (moles/volume)
2019 15:31:00





             Test Item    Value        Reference Range Interpretation Comments

 

             Bedside Sodium (test code = 135 mmol/L                             



             72989-0)                                            



[facility]. ElizabeVenous whole blood potassium measurement (moles/volume)
2019 15:31:00





             Test Item    Value        Reference Range Interpretation Comments

 

             Bedside Potassium (test code = 3.6 mmol/L                          

   



             28201-7)                                            



[facility] whole blood chloride measurement (moles/volume)
2019 15:31:00





             Test Item    Value        Reference Range Interpretation Comments

 

             Bedside Chloride (test code = 105 mmol/L                           

  



             87178-8)                                            



[facility] blood total carbon dioxide measurement 
(moles/volume)2019 15:31:00





             Test Item    Value        Reference Range Interpretation Comments

 

             Bedside Total CO2 (test code = 16.0 mmol/L                         

   



             7-1)                                             



[facility] whole blood urea nitrogen (BUN) measurement 
(mass/volume)2019 15:31:00





             Test Item    Value        Reference Range Interpretation Comments

 

             Bedside Blood Urea Nitrogen (test 11 mg/dL                         

      



             code = 81351-1)                                        



[facility] measurement (mass/volume)2019 
15:31:00





             Test Item    Value        Reference Range Interpretation Comments

 

             Bedside Creatinine (test code = 0.5 mg/dL                          

    



             46147-8)                                            



[facility]. ElizabeVenous whole blood glucose measurement (mass/volume)
2019 15:31:00





             Test Item    Value        Reference Range Interpretation Comments

 

             Bedside Glucose (test code = 215 mg/dL                             

 



             28020-2)                                            



[facility] ionized calcium measurement (moles/volume)
2019 15:31:00





             Test Item    Value        Reference Range Interpretation Comments

 

             Bedside Whole Blood Ionized 1.12 mmol/L                            



             Calcium (test code = 1994-3)                                       

 



[facility]. LaurieBlood anion [id number] 15:31:00





             Test Item    Value        Reference Range Interpretation Comments

 

             Bedside Anion Gap (test code = 02282-5) 19                         

            



[facility]. ElilianabethGFR estimate [id number] 15:31:00





             Test Item    Value        Reference Range Interpretation Comments

 

             Estimat Glomerular Filtration Rate 188                             

       



             (test code = 93403-2)                                        



[facility] (mass/volume)
2019 15:31:00





             Test Item    Value        Reference Range Interpretation Comments

 

             Bedside Hemoglobin (test code = 11.2 g/dL                          

    



             03658-4)                                            



[facility] blood hematocrit (volume fraction)2019 
15:31:00





             Test Item    Value        Reference Range Interpretation Comments

 

             Bedside Hematocrit (test code = 33.0 %                             

    



             47586-9)                                            



[facility] HospitalOLE BLOOD [id number] 14:58:00





             Test Item    Value        Reference Range Interpretation Comments

 

             WHOLE BLOOD GLUCOSE 239 MG/DL    70-99        H            **Fastin

g glucose



             (test code = POC GLU)                                        normal

 <100 MG/DL-



                                                                 [employer]

es



                                                                 Assoc recommend

ation**



PATHOLOGY [id number] 16:00:00TISSUE CONSULTATION REPORTBAPTIST [facility] ArlingtonDEPARTMENT OF PATHOLOGYP.[address] 
[phone] ST M.D.BAILEY NOLAND M.D.CHARLES E. BURNS, M.D.____________________________________________________________________________

____Patient: DAGOBERTO FERMIN 1970 48 MRoom:[facility]#: 
[id number]________________________________________________
________________________________Ordering Physician: GAGAN COTTER Rec.: 2019Date of Proc.: 2019Lab No.: 
[id number]__________________________________________________________
______________________Clinical History:Vomiting and abdominal pain.FINAL 
ANATOMIC DIAGNOSIS:RANDOM GASTRIC BIOPSIES:ANTRAL AND FUNDIC MUCOSA 
DEMONSTRATING MILD CHRONIC NON-ACTIVEGASTRITIS WITH SLIGHT SURFACE REACTIVE 
CHANGES AND MILD EROSION.NEGATIVE FOR MALIGNANCY OR DYSPLASIA.NO GOBLET CELLS 
SEEN ONALCIAN BLUE/PAS STAIN.NO HELICOBACTER ORGANISMS SEEN ON JEAN 
STAIN.FOCAL LYMPHANGIECTASIA NOTED.MICROSCOPIC EXAMINATION:Performed.GROSS 
APPEARANCE:The specimen is received in formalin labeled "random 
gastricbiopsies."  It is four fragments of tan-pink soft tissues rangingfrom 
0.2-0.4 cm.  TE in onecassette.PATHOLOGIST: Job Lindsay          
Electronically Signed: 2019WHOLE BLOOD [id number] 10:05:00





             Test Item    Value        Reference Range Interpretation Comments

 

             WHOLE BLOOD GLUCOSE 401 MG/DL    70-99                     **Fastin

g glucose



             (test code = POC GLU)                                        normal

 <100 MG/DL-



                                                                 [employer]

es



                                                                 Assoc recommend

ation**



WHOLE BLOOD [id number] 10:05:00





             Test Item    Value        Reference Range Interpretation Comments

 

             WHOLE BLOOD GLUCOSE 344 MG/DL    70-99                     **Fastin

g glucose



             (test code = POC GLU)                                        normal

 <100 MG/DL-



                                                                 [employer]

es



                                                                 Assoc recommend

ation**



WHOLE BLOOD [id number] 02:15:00





             Test Item    Value        Reference Range Interpretation Comments

 

             WHOLE BLOOD GLUCOSE 336 MG/DL    70-99        H            **Fastin

g glucose



             (test code = POC GLU)                                        normal

 <100 MG/DL-



                                                                 [employer]

es



                                                                 Assoc recommend

ation**



WHOLE BLOOD [id number] 17:35:00





             Test Item    Value        Reference Range Interpretation Comments

 

             WHOLE BLOOD GLUCOSE 265 MG/DL    70-99        H            **Fastin

g glucose



             (test code = POC GLU)                                        normal

 <100 MG/DL-



                                                                 [employer]

es



                                                                 Assoc recommend

ation**



WHOLE BLOOD [id number] 13:10:00





             Test Item    Value        Reference Range Interpretation Comments

 

             WHOLE BLOOD GLUCOSE 260 MG/DL    70-99        H            **Fastin

g glucose



             (test code = POC GLU)                                        normal

 <100 MG/DL-



                                                                 [employer]

es



                                                                 Assoc recommend

ation**



% HEMOGLOBIN A1C (GLYCATED)2019 08:16:00





             Test Item    Value        Reference Range Interpretation Comments

 

             HEMOGLOBIN A1C (test 11.6 %       0-6          H                 TH

ERAPEUTIC TARGET



             code = GLYCO-)                                        FOR THE TREAT

MENT OF



                                                                 DIABETES      M

FIDEL



                                                                 PATIENTS IS < 7

% HBA1C.



                                                                     [employer]

ABETES



                                                                 ASSOC. DIABETES

 CARE



                                                                 2002;25:S33-S49



[id number] 07:59:00HEART RATE: 93 bpmRR Interval: 645 msAtrial Rate: 93 msP-R
 Interval: 89 msP Duration: 76 msP Horizontal Axis: 159 degP Front Axis: 267 
degQ Onset: 499 msQRSD Interval: 94 msQT Interval: 410 msQTcB: 511msQTcF: 475 
msQRS Horizontal Axis: 244 degQRS Axis: 96 degI-40 Horizontal Axis: -3 degI-40 
Front Axis: 67 degT-40 Horizontal Axis: 235 degT-40 Front Axis: 117 degT 
Horizontal Axis:  degT Wave Axis: 71 degS-T Horizontal Axis: 88 degS-T Front 
Axis: -33 degECG Severity: - ABNORMAL ECG -ECG Impression: Ectopic atrial 
rhythmECG Impression: Prolonged QT intervalWHOLE BLOOD [id number] 
05:35:00





             Test Item    Value        Reference Range Interpretation Comments

 

             WHOLE BLOOD GLUCOSE 137 MG/DL    70-99        H            **Fastin

g glucose



             (test code = POC GLU)                                        normal

 <100 MG/DL-



                                                                 [employer]

es



                                                                 Assoc recommend

ation**



WHOLE BLOOD [id number] 20:55:00





             Test Item    Value        Reference Range Interpretation Comments

 

             WHOLE BLOOD GLUCOSE 270 MG/DL    70-99        H            **Fastin

g glucose



             (test code = POC GLU)                                        normal

 <100 MG/DL-



                                                                 [employer]

es



                                                                 Assoc recommend

ation**



WHOLE BLOOD [id number] 16:10:00





             Test Item    Value        Reference Range Interpretation Comments

 

             WHOLE BLOOD GLUCOSE 105 MG/DL    70-99                     **Fastin

g glucose



             (test code = POC GLU)                                        normal

 <100 MG/DL-



                                                                 [employer]

es



                                                                 Assoc recommend

ation**



[id number] 15:42:00





             Test Item    Value        Reference Range Interpretation Comments

 

             K+ (test code = 4.3 MMOL/L   3.5-5.1                   **** PLEASE 

NOTE NEW



             KSERUM)                                             REFERENCE RANGE

(S) IN



                                                                 EFFECT **** EFF

ECTIVE



                                                                 2010 - NEW 

ANALYZER



                                                                 (VITROS 5600)



[id number] 15:42:00





             Test Item    Value        Reference Range Interpretation Comments

 

             MG (test code = MG) 2.0 mg/dL    1.6-2.3                   



[id number] 15:42:00





             Test Item    Value        Reference Range Interpretation Comments

 

             PHOSPHOR (test code = PHOSPHOR) 2.5 MG/DL    2.5-4.5               

    



WHOLE BLOOD [id number] 11:45:00





             Test Item    Value        Reference Range Interpretation Comments

 

             WHOLE BLOOD GLUCOSE 439 MG/DL    70-99        H            **Fastin

g glucose



             (test code = POC GLU)                                        normal

 <100 MG/DL-



                                                                 [employer]

es



                                                                 Assoc recommend

ation**



WHOLE BLOOD [id number] 09:10:00





             Test Item    Value        Reference Range Interpretation Comments

 

             WHOLE BLOOD GLUCOSE 162 MG/DL    70-99        H            **Fastin

g glucose



             (test code = POC GLU)                                        normal

 <100 MG/DL-



                                                                 [employer]

es



                                                                 Assoc recommend

ation**



WHOLE BLOOD [id number] 08:30:00





             Test Item    Value        Reference Range Interpretation Comments

 

             WHOLE BLOOD GLUCOSE 116 MG/DL    70-99        H            **Fastin

g glucose



             (test code = POC GLU)                                        normal

 <100 MG/DL-



                                                                 [employer]

es



                                                                 Assoc recommend

ation**



WHOLE BLOOD [id number] 08:30:00





             Test Item    Value        Reference Range Interpretation Comments

 

             WHOLE BLOOD GLUCOSE 106 MG/DL    70-99                     **Fastin

g glucose



             (test code = POC GLU)                                        normal

 <100 MG/DL-



                                                                 [employer]

es



                                                                 Assoc recommend

ation**



WHOLE BLOOD [id number] 08:30:00





             Test Item    Value        Reference Range Interpretation Comments

 

             WHOLE BLOOD GLUCOSE 108 MG/DL    70-99                     **Fastin

g glucose



             (test code = POC GLU)                                        normal

 <100 MG/DL-



                                                                 [employer]

es



                                                                 Assoc recommend

ation**



WHOLE BLOOD [id number] 08:30:00





             Test Item    Value        Reference Range Interpretation Comments

 

             WHOLE BLOOD GLUCOSE 152 MG/DL    70-99        H            **Fastin

g glucose



             (test code = POC GLU)                                        normal

 <100 MG/DL-



                                                                 [employer]

es



                                                                 Assoc recommend

ation**



WHOLE BLOOD [id number] 08:30:00





             Test Item    Value        Reference Range Interpretation Comments

 

             WHOLE BLOOD GLUCOSE 172 MG/DL    70-99        H            **Fastin

g glucose



             (test code = POC GLU)                                        normal

 <100 MG/DL-



                                                                 [employer]

es



                                                                 Assoc recommend

ation**



WHOLE BLOOD [id number] 07:40:00





             Test Item    Value        Reference Range Interpretation Comments

 

             WHOLE BLOOD GLUCOSE 187 MG/DL    70-99        H            **Fastin

g glucose



             (test code = POC GLU)                                        normal

 <100 MG/DL-



                                                                 [employer]

es



                                                                 Assoc recommend

ation**



US LIMITED ABD [id number] 07:35:[address] [address]COWPRZERZP IMAGING REPORTPatient Name:
 DAGOBERTO FERMIN RDate of Service: 59-09-6914Fse:  48    Sex: M  Order #: [id number]     
Room:   [facility]  2SDOB: 1970     X-Ray Number: [id number] Record 
Number: [id number]     Hospital Number: [id number] Physician: KISHORE SARAVIA -Ordering Physician: Otis RYAN upper quadrant 
ultrasound.History: Pain.Technique: Transabdominal ultrasound images of the 
right upper quadrantwere reviewed.Findings:Ultrasound images demonstrate no 
abnormalities involving the liver. Thegallbladder is unremarkable. CBD measures 
6.5 mm. Portal vein abdominalaorta IVC and pancreas appear normal. The right 
kidney is normal at 10.7 x5.5 x 5.1 cm.ImpressionUnremarkable right upper 
quadrant ultrasound.Electronically Signed By: Rigo Ramirez M.D., 2019
 7:33 AMLegally authenticated by DIEGO SLATER 2019 07:33:34BMP, BASIC
 METABOLIC [id number] 02:34:00





             Test Item    Value        Reference Range Interpretation Comments

 

             SODIUM (test code = 132 MMOL/L   137-145      L            



             NA)                                                 

 

             K+ (test code = 3.8 MMOL/L   3.5-5.1                   **** PLEASE 

NOTE NEW



             KSERUM)                                             REFERENCE RANGE

(S)



                                                                 IN EFFECT ****



                                                                 EFFECTIVE 

010 -



                                                                 NEW ANALYZER (V

ITROS



                                                                 5600)

 

             CHLORIDE (test code 109 MMOL/L          H            



             = CL)                                               

 

             CO2 (test code = 17 MMOL/L    22-30        L            



             CO2)                                                

 

             BUN (test code = 19 MG/DL     9-20                      



             BUN)                                                

 

             CREA (test code = 0.6 MG/DL    0.8-1.5      L            



             CREA)                                               

 

             GLUCOSE (test code 279 MG/DL    70-99        H            **Fasting

 glucose



             = GLUCOSE)                                          normal <100 MG/

DL-



                                                                 [employer] [facility]

es



                                                                 Assoc



                                                                 recommendation*

*

 

             CALCIUM (test code 7.9 MG/DL    8.4-10.2     L            



             = CABLOOD)                                          

 

             GFR (test code = 152                                    A GFR of >9

0



             GFR)         mL/min/1.73m2                           mL/min/1.73m2 

is



                                                                 considered norm

al.



[id number] 02:34:00





             Test Item    Value        Reference Range Interpretation Comments

 

             MG (test code = MG) 1.7 mg/dL    1.6-2.3                   



[id number] 02:34:00





             Test Item    Value        Reference Range Interpretation Comments

 

             PHOSPHOR (test code = PHOSPHOR) 1.3 MG/DL    2.5-4.5      L        

    



BLOOD GAS PH [id number] 02:15:00





             Test Item    Value        Reference Range Interpretation Comments

 

             BGPHVEN (test code = BGPHVEN) 7.33                                 

  



WHOLE BLOOD [id number] 01:30:00





             Test Item    Value        Reference Range Interpretation Comments

 

             WHOLE BLOOD GLUCOSE 189 MG/DL    70-99        H            **Fastin

g glucose



             (test code = POC GLU)                                        normal

 <100 MG/DL-



                                                                 [facility]

es



                                                                 Assoc recommend

ation**



[id number] 23:45:00





             Test Item    Value        Reference Range Interpretation Comments

 

             PHOSPHOR (test code = PHOSPHOR) 1.9 MG/DL    2.5-4.5      L        

    



BMP, BASIC METABOLIC [id number] 23:44:00





             Test Item    Value        Reference Range Interpretation Comments

 

             SODIUM (test code = 138 MMOL/L   137-145                   



             NA)                                                 

 

             K+ (test code = 4.3 MMOL/L   3.5-5.1                   **** PLEASE 

NOTE NEW



             KSERUM)                                             REFERENCE RANGE

(S)



                                                                 IN EFFECT ****



                                                                 EFFECTIVE 

010 -



                                                                 NEW ANALYZER (V

ITROS



                                                                 5600)

 

             CHLORIDE (test code 112 MMOL/L          H            



             = CL)                                               

 

             CO2 (test code = 14 MMOL/L    22-30        L            



             CO2)                                                

 

             BUN (test code = 21 MG/DL     9-20         H            



             BUN)                                                

 

             CREA (test code = 0.8 MG/DL    0.8-1.5                   



             CREA)                                               

 

             GLUCOSE (test code 202 MG/DL    70-99        H            **Fasting

 glucose



             = GLUCOSE)                                          normal <100 MG/

DL-



                                                                 [facility]

es



                                                                 Assoc



                                                                 recommendation*

*

 

             CALCIUM (test code 8.2 MG/DL    8.4-10.2     L            



             = CABLOOD)                                          

 

             GFR (test code = 109                                    A GFR of >9

0



             GFR)         mL/min/1.73m2                           mL/min/1.73m2 

is



                                                                 considered norm

al.



[id number] 23:44:00





             Test Item    Value        Reference Range Interpretation Comments

 

             MG (test code = MG) 1.8 mg/dL    1.6-2.3                   



WHOLE BLOOD [id number] 23:25:00





             Test Item    Value        Reference Range Interpretation Comments

 

             WHOLE BLOOD GLUCOSE 186 MG/DL    70-99        H            **Fastin

g glucose



             (test code = POC GLU)                                        normal

 <100 MG/DL-



                                                                 [facility]

es



                                                                 Assoc recommend

ation**



BLOOD GAS PH [id number] 22:35:00





             Test Item    Value        Reference Range Interpretation Comments

 

             BGPHVEN (test code = BGPHVEN) 7.24                                 

  



[id number] 21:50:00





             Test Item    Value        Reference Range Interpretation Comments

 

             CALCIUM (test code = CABLOOD) 9.9 MG/DL    8.4-10.2                

  



WHOLE BLOOD [id number] 21:15:00





             Test Item    Value        Reference Range Interpretation Comments

 

             WHOLE BLOOD GLUCOSE 264 MG/DL    70-99        H            **Fastin

g glucose



             (test code = POC GLU)                                        normal

 <100 MG/DL-



                                                                 [facility]

es



                                                                 Assoc recommend

ation**



SCRN [id number] 20:27:00





             Test Item    Value        Reference Range Interpretation Comments

 

             SCRN ALT (test code = SCRN ALT) 33 U/L       13-69                 

    



WHOLE BLOOD [id number] 20:25:00





             Test Item    Value        Reference Range Interpretation Comments

 

             WHOLE BLOOD GLUCOSE 297 MG/DL    70-99        H            **Fastin

g glucose



             (test code = POC GLU)                                        normal

 <100 MG/DL-



                                                                 [facility]

es



                                                                 Assoc recommend

ation**



HEPATITIS C ANTIBODY [id number] 19:54:00





             Test Item    Value        Reference Range Interpretation Comments

 

             SCRN HCV (test code REACTIVE     NEGATIVE                  Hepatiti

s C Antibody test



             = SCRN HCV)                                         is for screenin

g purposes



                                                                 only. All react

john will



                                                                 be confirmed by



                                                                 additional test

ing.



ER SCREEN FOR HIV  19:54:00





             Test Item    Value        Reference Range Interpretation Comments

 

             HIV 1/2 AB (test NEGATIVE     NEGATIVE                  This test i

s used for



             code = SCRN HIV)                                        SCREENING p

urposes only.



                                                                 All reactive re

sults are



                                                                 prelimenary and



                                                                 confirmation re

sults will



                                                                 follow.



[facility] LAB ARTERIAL [id number] 19:29:00





             Test Item    Value        Reference Range Interpretation Comments

 

             SITE (test code = SITE) LRAD         SITE                      

 

             ALLENS (test code = ALLENS) POS                                    

 

             BGLAC (test code = BGLAC) 15 mg/dL     5.0-18.0                  



[id number] 19:26:00





             Test Item    Value        Reference Range Interpretation Comments

 

             MG (test code = MG) 2.4 mg/dL    1.6-2.3      H            



[id number] 19:26:00





             Test Item    Value        Reference Range Interpretation Comments

 

             PHOSPHOR (test code = PHOSPHOR) 4.5 MG/DL    2.5-4.5               

    



[id number] 19:12:00





             Test Item    Value        Reference Range Interpretation Comments

 

             GLUCOSE (test code = URGLU) >=1000 MG/DL NEG-100                   

 

             BILIRUBN (test code = URBILI) NEGATIVE     NEGATIVE                

  

 

             KETONE (test code = URKET) >1=160 MG/DL NEGATIVE                  

 

             BLOOD (test code = URBLD) MODERATE                               

 

             UR PH (test code = URPH) 5.0          5.0-7.5                   

 

             PROTEIN (test code = URPRO) 100 MG/DL    NEGATIVE                  

 

             NITRITES (test code = URNIT) NEGATIVE     NEGATIVE                 

 

 

             UROBILINGEN (test code = URURO) 0.2 EU/DL    0.2-1.0               

    

 

             LEUKOCYT (test code = URLEU) NEGATIVE     NEGATIVE                 

 

 

             UA COLOR (test code = UA COLOR) YELLOW       YELLOW                

    

 

             CLARITY (test code = CLARITY) CLEAR        CLEAR                   

  

 

             SP GRAV (test code = URSPGRAV) 1.029        1.000-1.025  H         

   

 

             UAMICRO (test code = UAMICRO) YES                                  

  

 

             WBC (test code = URWBC) 2 /HPF       0-5                       

 

             RBC (test code = URRBC) 13 /HPF      0-2          H            

 

             CASTS (test code = CAST) 5 /LPF       0-3          H            

 

             UR EPI (test code = EPI) 14 /LPF                                

 

             BACTERIA (test code = BACTERIA) NEGATIVE     NONE                  

    



BLOOD GAS [id number] 18:31:00





             Test Item    Value        Reference Range Interpretation Comments

 

             SITE (test code = SITE) LR           SITE                      

 

             ALLENS (test code = ALLENS) POS                                    

 

             O2 EQUIP (test code = O2 EQUIP) ROOM AIR     O2-DEVICE             

    

 

             PH (test code = BGPH) 7.26         7.35-7.45    L            

 

             PCO2 (test code = PCO2) 18 MMHG      34.0-45.0    LL           

 

             PO2 (test code = PO2) 105 MMHG     88-96        H            

 

             HCO3 (test code = HCO3) 8.1 mmol/L   22.0-26.0    L            

 

             BE (test code = BE) -16.3 mmol/L -2.0-2.0     L            

 

             THB (test code = THB) 16.0 G/DL    13.5-18                   

 

             % 02 HB (test code = ABGSAT) 95.8 %       96.0-100.0   L           

 

 

             %COHB (test code = BGCO) 1.2 %        <1.5                      

 

             % MET HB (test code = %MET HB) 0.3 %        0.4-1.5      L         

   

 

             CAO2 (test code = CAO2) 21.6 VOL%    17.6-24.3                 

 

             PF/RATIO (test code = PF/RATIO) 500.0                              

    



RBV DR DECKER AT 1820 BY JOB MAGAÑA [facility] ON [id number] 18:24:00





             Test Item    Value        Reference Range Interpretation Comments

 

             LIPASE (test code = LIPA) 38 U/L                           



LIVER [id number] 18:24:00





             Test Item    Value        Reference Range Interpretation Comments

 

             TOTPROT (test code = TOTPROT) 8.4 G/DL     6.3-8.2      H          

  

 

             ALBUMIN (test code = ALBSERUM) 5.3 G/DL     3.5-5.0      H         

   

 

             BILITOT (test code = BILITOT) 1.1 MG/DL    0.2-1.3                 

  

 

             BILIDIR (test code = BILIDIR) 0.5 MG/DL    0.0-0.4      H          

  

 

             AST (test code = AST) 33 U/L       15-46                     

 

             PHOSALK (test code = PHOSALK) 103 U/L                        

  

 

             ALT (test code = ALT) 31 U/L       13-69                     



[id number] 17:42:00





             Test Item    Value        Reference Range Interpretation Comments

 

             WBC (test code = 10.3 K/UL    3.5-10.9                  



             WBC)                                                

 

             RBC (test code = 5.36 M/UL    4.3-5.7                   



             RBC)                                                

 

             HGB (test code = 17.5 G/DL    13.0-17.9                 



             HGB)                                                

 

             HCT (test code = 52.5 %       38-52        H            



             HCT)                                                

 

             MCV (test code = 97.9 FL      80-98                     



             MCV)                                                

 

             MCH (test code = 32.6 PG      28-32        H            



             MCH)                                                

 

             MCHC (test code = 33.3 G/DL    32.5-36.5                 



             MCHC)                                               

 

             RDW (test code = 12.4 %       11.5-14.5                 



             RDW)                                                

 

             PLT (test code = 418 K/UL     150-450                   



             PLT)                                                

 

             MPV (test code = 9.9 FL       7.4-10.4                  



             MPV)                                                

 

             MANDIFF (test code = NO                                     



             MANDIFF)                                            

 

             SCAN (test code = NO                                     



             SCAN)                                               

 

             NEUT% (test code = 80.1 %       40-75        H            



             NEUT%)                                              

 

             LYMPH% (test code = 10.4 %       24-44        L            



             LYMPH%)                                             

 

             MONO% (test code = 8.0 %        0-13                      



             MONO%)                                              

 

             EOS% (test code = 0.0 %        0-4                       



             EOS%)                                               

 

             BASO % (test code = 0.3 %        0-2                       



             BASO%)                                              

 

             IG (test code = IG) 0 %          0-1                       

 

             IG% (test code = 1.2 %        0-1          H            IG% = Metam

yelocytes,



             IG%)                                                Myelocytes, and



                                                                 Promyelocytes.



                                                                 (Immature neutr

ophils



                                                                 not including "

bands".)



                                                                 **> 3% IG indic

ates



                                                                 risk of sepsis

 

             NRBC% (test code = 0 /100 WBC                             



             NRBC%)                                              

 

             ABS NEUT (test code 8.3 K/UL     1.2-7.2      H            



             = NEUT)                                             



ISTAT CHEM [id number] 17:20:00





             Test Item    Value        Reference Range Interpretation Comments

 

             ISTATNA (test code = 134 MMOL/L   137-145      L            



             ISTATNA)                                            

 

             ISTATK (test code = 5.4 MMOL/L   3.6-5.0      H            



             ISTATK)                                             

 

             ISTATCL (test code = 103 MMOL/L                       



             ISTATCL)                                            

 

             ISTIONCA (test code = 1.06 MMOL/L  1.12-1.32    L            



             ISTIONCA)                                           

 

             ISTCO2 (test code = 9 MMOL/L     22-30        LL           



             ISTCO2)                                             

 

             ISTATGLU (test code = 441 MG/DL           H            



             ISTATGLU)                                           

 

             ISTATBUN (test code = 32.0 MG/DL   7.0-20.0     H            



             ISTATBUN)                                           

 

             ISTCREA (test code = 1.1 MG/DL    0.7-1.5                   



             ISTCREA)                                            

 

             ISTATHCT (test code = 56 %PCV      37.0-52.0    H            



             ISTATHCT)                                           

 

             ISTATHGB (test code = 19.0 G/DL    12.0-18.0    H            Notifi

ed Nurse/MD of



             ISTATHGB)                                           results outside

 of



                                                                 Reference Range

s

 

             ISTANGAP (test code = 28 MMOL/L                              Notifi

ed Nurse/MD of



             ISTANGAP)                                           results outside

 of



                                                                 Reference Range

s



[id number] 21:44:00





             Test Item    Value        Reference Range Interpretation Comments

 

             GLUCOSE (test code = URGLU) >=1000 MG/DL NEG-100                   

 

             BILIRUBN (test code = URBILI) NEGATIVE     NEGATIVE                

  

 

             KETONE (test code = URKET) 80 MG/DL     NEGATIVE                  

 

             BLOOD (test code = URBLD) MODERATE                               

 

             UR PH (test code = URPH) 5.5          5.0-7.5                   

 

             PROTEIN (test code = URPRO) 300 MG/DL    NEGATIVE                  

 

             NITRITES (test code = URNIT) NEGATIVE     NEGATIVE                 

 

 

             UROBILINGEN (test code = URURO) 0.2 EU/DL    0.2-1.0               

    

 

             LEUKOCYT (test code = URLEU) NEGATIVE     NEGATIVE                 

 

 

             UA COLOR (test code = UA COLOR) YELLOW       YELLOW                

    

 

             CLARITY (test code = CLARITY) CLEAR        CLEAR                   

  

 

             SP GRAV (test code = URSPGRAV) 1.025        1.000-1.025            

   

 

             UAMICRO (test code = UAMICRO) YES                                  

  

 

             WBC (test code = URWBC) 1 /HPF       0-5                       

 

             RBC (test code = URRBC) 10 /HPF      0-2          H            

 

             CASTS (test code = CAST) 8 /LPF       0-3          H            

 

             UR EPI (test code = EPI) 24 /LPF                                

 

             BACTERIA (test code = BACTERIA) NEGATIVE     NONE                  

    



[id number] 20:49:00





             Test Item    Value        Reference Range Interpretation Comments

 

             LIPASE (test code = LIPA) 37 U/L                           



LIVER [id number] 20:49:00





             Test Item    Value        Reference Range Interpretation Comments

 

             TOTPROT (test code = TOTPROT) 6.9 G/DL     6.3-8.2                 

  

 

             ALBUMIN (test code = ALBSERUM) 4.5 G/DL     3.5-5.0                

   

 

             BILITOT (test code = BILITOT) 0.6 MG/DL    0.2-1.3                 

  

 

             BILIDIR (test code = BILIDIR) 0.4 MG/DL    0.0-0.4                 

  

 

             AST (test code = AST) 30 U/L       15-46                     

 

             PHOSALK (test code = PHOSALK) 106 U/L                        

  

 

             ALT (test code = ALT) 29 U/L       13-69                     



ISTAT CHEM [id number] 20:45:00





             Test Item    Value        Reference Range Interpretation Comments

 

             ISTATNA (test code = 133 MMOL/L   137-145      L            



             ISTATNA)                                            

 

             ISTATK (test code = 4.8 MMOL/L   3.6-5.0                   



             ISTATK)                                             

 

             ISTATCL (test code = 99 MMOL/L                        



             ISTATCL)                                            

 

             ISTIONCA (test code = 1.17 MMOL/L  1.12-1.32                 



             ISTIONCA)                                           

 

             ISTCO2 (test code = 14 MMOL/L    22-30        L            



             ISTCO2)                                             

 

             ISTATGLU (test code = 275 MG/DL           H            



             ISTATGLU)                                           

 

             ISTATBUN (test code = 19.0 MG/DL   7.0-20.0                  



             ISTATBUN)                                           

 

             ISTCREA (test code = 1.1 MG/DL    0.7-1.5                   



             ISTCREA)                                            

 

             ISTATHCT (test code = 45 %PCV      37.0-52.0                 



             ISTATHCT)                                           

 

             ISTATHGB (test code = 15.3 G/DL    12.0-18.0                 Notifi

ed Nurse/MD of



             [facility])                                           results outside

 of



                                                                 Reference Range

s

 

             ISTANGAP (test code = 26 MMOL/L                              Notifi

ed Nurse/MD of



             [facility])                                           results outside

 of



                                                                 Reference Range

s



[id number] 20:26:00





             Test Item    Value        Reference Range Interpretation Comments

 

             WBC (test code = 8.7 K/UL     3.5-10.9                  



             WBC)                                                

 

             RBC (test code = 4.52 M/UL    4.3-5.7                   



             RBC)                                                

 

             HGB (test code = 14.9 G/DL    13.0-17.9                 



             HGB)                                                

 

             HCT (test code = 43.3 %       38-52                     



             HCT)                                                

 

             MCV (test code = 95.8 FL      80-98                     



             MCV)                                                

 

             MCH (test code = 33.0 PG      28-32        H            



             MCH)                                                

 

             MCHC (test code = 34.4 G/DL    32.5-36.5                 



             MCHC)                                               

 

             RDW (test code = 12.3 %       11.5-14.5                 



             RDW)                                                

 

             PLT (test code = 361 K/UL     150-450                   



             PLT)                                                

 

             MPV (test code = 9.3 FL       7.4-10.4                  



             MPV)                                                

 

             MANDIFF (test code = NO                                     



             MANDIFF)                                            

 

             SCAN (test code = NO                                     



             SCAN)                                               

 

             NEUT% (test code = 74.7 %       40-75                     



             NEUT%)                                              

 

             LYMPH% (test code = 16.5 %       24-44        L            



             LYMPH%)                                             

 

             MONO% (test code = 7.0 %        0-13                      



             MONO%)                                              

 

             EOS% (test code = 0.1 %        0-4                       



             EOS%)                                               

 

             BASO % (test code = 0.5 %        0-2                       



             BASO%)                                              

 

             IG (test code = IG) 0 %          0-1                       

 

             IG% (test code = 1.2 %        0-1          H            IG% = Metam

yelocytes,



             IG%)                                                Myelocytes, and



                                                                 Promyelocytes.



                                                                 (Immature neutr

ophils



                                                                 not including "

bands".)



                                                                 **> 3% IG indic

ates



                                                                 risk of sepsis

 

             NRBC% (test code = 0 /100 WBC                             



             NRBC%)                                              

 

             ABS NEUT (test code 6.5 K/UL     1.2-7.2                   



             = NEUT)                                             



ISTAT CHEM [id number] 12:17:00





             Test Item    Value        Reference Range Interpretation Comments

 

             ISTATNA (test code = 132 MMOL/L   137-145      L            



             ISTATNA)                                            

 

             ISTATK (test code = 4.7 MMOL/L   3.6-5.0                   



             ISTATK)                                             

 

             ISTATCL (test code = 92 MMOL/L           L            



             ISTATCL)                                            

 

             ISTIONCA (test code = 1.10 MMOL/L  1.12-1.32    L            



             ISTIONCA)                                           

 

             ISTCO2 (test code = 12 MMOL/L    22-30        L            



             ISTCO2)                                             

 

             ISTATGLU (test code = 549 MG/DL           HH           



             ISTATGLU)                                           

 

             ISTATBUN (test code = 26.0 MG/DL   7.0-20.0     H            



             ISTATBUN)                                           

 

             ISTCREA (test code = 1.5 MG/DL    0.7-1.5                   



             ISTCREA)                                            

 

             ISTATHCT (test code = 48 %PCV      37.0-52.0                 



             ISTATHCT)                                           

 

             ISTATHGB (test code = 16.3 G/DL    12.0-18.0                 Notifi

ed Nurse/MD of



             ISTATHGB)                                           results outside

 of



                                                                 Reference Range

s

 

             ISTANGAP (test code = 34 MMOL/L                              Notifi

ed Nurse/MD of



             ISTANGAP)                                           results outside

 of



                                                                 Reference Range

s



WHOLE BLOOD [id number] 16:40:00





             Test Item    Value        Reference Range Interpretation Comments

 

             WHOLE BLOOD GLUCOSE 144 MG/DL    70-99        H            **Fastin

g glucose



             (test code = POC GLU)                                        normal

 <100 MG/DL-



                                                                 [employer]

es



                                                                 Assoc recommend

ation**



WHOLE BLOOD [id number] 11:35:00





             Test Item    Value        Reference Range Interpretation Comments

 

             WHOLE BLOOD GLUCOSE 118 MG/DL    70-99        H            **Fastin

g glucose



             (test code = POC GLU)                                        normal

 <100 MG/DL-



                                                                 [employer]

es



                                                                 Assoc recommend

ation**



WHOLE BLOOD [id number] 08:20:00





             Test Item    Value        Reference Range Interpretation Comments

 

             WHOLE BLOOD GLUCOSE 133 MG/DL    70-99        H            **Fastin

g glucose



             (test code = POC GLU)                                        normal

 <100 MG/DL-



                                                                 [employer]

es



                                                                 Assoc recommend

ation**



[id number] 08:17:00





             Test Item    Value        Reference Range Interpretation Comments

 

             SODIUM (test code = 135 MMOL/L   137-145      L            



             NA)                                                 

 

             K+ (test code = 3.7 MMOL/L   3.5-5.1                   **** PLEASE 

NOTE NEW



             KSERUM)                                             REFERENCE RANGE

(S)



                                                                 IN EFFECT ****



                                                                 EFFECTIVE 

010 -



                                                                 NEW ANALYZER (V

ITROS



                                                                 5600)

 

             CHLORIDE (test code 101 MMOL/L                       



             = CL)                                               

 

             CO2 (test code = 25 MMOL/L    22-30                     



             CO2)                                                

 

             BUN (test code = 7 MG/DL      9-20         L            



             BUN)                                                

 

             CREA (test code = 0.6 MG/DL    0.8-1.5      L            



             CREA)                                               

 

             GLUCOSE (test code 91 MG/DL     70-99                     **Fasting

 glucose



             = GLUCOSE)                                          normal <100 MG/

DL-



                                                                 [employer]

es



                                                                 Assoc



                                                                 recommendation*

*

 

             CALCIUM (test code 8.7 MG/DL    8.4-10.2                  



             = CABLOOD)                                          

 

             TOTPROT (test code 6.0 G/DL     6.3-8.2      L            



             = TOTPROT)                                          

 

             ALBUMIN (test code 3.8 G/DL     3.5-5.0                   



             = ALBSERUM)                                         

 

             BILITOT (test code 0.4 MG/DL    0.2-1.3                   



             = BILITOT)                                          

 

             AST (test code = 30 U/L       15-46                     



             AST)                                                

 

             PHOSALK (test code 74 U/L                           



             = PHOSALK)                                          

 

             ALT (test code = 47 U/L       13-69                     



             ALT)                                                

 

             GFR (test code = 152                                    A GFR of >9

0



             GFR)         mL/min/1.73m2                           mL/min/1.73m2 

is



                                                                 considered norm

al.



[id number] 08:17:00





             Test Item    Value        Reference Range Interpretation Comments

 

             LIPASE (test code = LIPA) 22 U/L              L            



CT ABDOMEN/PELVIS [id number] 08:13:00[address] [address]GNHRFUSBHK IMAGING REPORTPatient Name:
 DAGOBERTO FERMIN RDate of Service: [id number]:  48    Sex: M  Order #: [id number]     
Room:  [address] [place] [address]  T3DOB: 1970     X-Ray Number: [id number] Record 
Number: [id number]     Hospital Number: [id number] Physician: KISHORE SARAVIA -Ordering Physician: KISHORE SARAVIA -CT ABDOMEN/PELVISWITH   2019 
6:51 PMHistory:  n/v/abd painComparisons: 2016Contrast administered for this
 study per protocol:   Isovue 300 - 100 mLIV. 15 mL Gastrografin by mouth per 
protocol.This CT exam was performed using one or more of the following 
dosereduction techniques: Automated exposure control, adjustment of the MAand/or
 KV according to patient size or use of iterative reconstructiontechnique.FIND
INGS:There are pancreatic calcifications and minimal surrounding fat 
stranding.This is consistent with chronic pancreatitis. Acute on chronic 
pancreatitiscannot absolutely be excluded. Correlate with amylase and lipase 
levels.The liver, spleen and adrenal glands are unremarkable.Both kidneys 
enhance symmetrically. There is no hydronephrosis. There is atiny nonobstructing
 LEFT midpole 5 mm calculus.There is no free air.There is trace free fluid.There
 are no abnormal fluid collections to suggest abscess or hematoma.There is no CT
 evidence for appendicitis, diverticulitis or pancreatitis.There is no small 
bowel obstruction detected.There is no obvious enlarged 
adenopathy.IMPRESSION:Chronic pancreatitis. Correlate with amylase and lipase 
levels to excludesuperimposed acute pancreatitis.Nonobstructing5 mm LEFT renal 
calculus.Colonic fecal loading.Electronically Signed By: Mauro Martin M.D., 
2019 8:11 AMLegally authenticated by YEIMY ROSALES 2019 08:11:41[facility]
2019 07:15:00





             Test Item    Value        Reference Range Interpretation Comments

 

             WBC (test code = 7.7 K/UL     3.5-10.9                  



             WBC)                                                

 

             RBC (test code = 4.21 M/UL    4.3-5.7      L            



             RBC)                                                

 

             HGB (test code = 13.8 G/DL    13.0-17.9                 



             HGB)                                                

 

             HCT (test code = 40.6 %       38-52                     



             HCT)                                                

 

             MCV (test code = 96.4 FL      80-98                     



             MCV)                                                

 

             MCH (test code = 32.8 PG      28-32        H            



             MCH)                                                

 

             MCHC (test code = 34.0 G/DL    32.5-36.5                 



             MCHC)                                               

 

             RDW (test code = 12.7 %       11.5-14.5                 



             RDW)                                                

 

             PLT (test code = 241 K/UL     150-450                   



             PLT)                                                

 

             MPV (test code = 10.5 FL      7.4-10.4     H            



             MPV)                                                

 

             MANDIFF (test code = NO                                     



             MANDIFF)                                            

 

             SCAN (test code = NO                                     



             SCAN)                                               

 

             NEUT% (test code = 66.4 %       40-75                     



             NEUT%)                                              

 

             LYMPH% (test code = 21.4 %       24-44        L            



             LYMPH%)                                             

 

             MONO% (test code = 11.1 %       0-13                      



             MONO%)                                              

 

             EOS% (test code = 0.4 %        0-4                       



             EOS%)                                               

 

             BASO % (test code = 0.3 %        0-2                       



             BASO%)                                              

 

             IG (test code = IG) 0 %          0-1                       

 

             IG% (test code = 0.4 %        0-1                       IG% = Metam

yelocytes,



             IG%)                                                Myelocytes, and



                                                                 Promyelocytes.



                                                                 (Immature neutr

ophils



                                                                 not including "

bands".)



                                                                 **> 3% IG indic

ates



                                                                 risk of sepsis

 

             NRBC% (test code = 0 /100 WBC                             



             NRBC%)                                              

 

             ABS NEUT (test code 5.1 K/UL     1.2-7.2                   



             = NEUT)                                             



WHOLE BLOOD [id number] 21:30:00





             Test Item    Value        Reference Range Interpretation Comments

 

             WHOLE BLOOD GLUCOSE 100 MG/DL    70-99                     **Fastin

g glucose



             (test code = POC GLU)                                        normal

 <100 MG/DL-



                                                                 [employer]

es



                                                                 Assoc recommend

ation**



WHOLE BLOOD [id number] 16:25:00





             Test Item    Value        Reference Range Interpretation Comments

 

             WHOLE BLOOD GLUCOSE 96 MG/DL     70-99                     **Fastin

g glucose



             (test code = POC GLU)                                        normal

 <100 MG/DL-



                                                                 [employer]

es Assoc



                                                                 recommendation*

*



WHOLE BLOOD [id number] 11:50:00





             Test Item    Value        Reference Range Interpretation Comments

 

             WHOLE BLOOD GLUCOSE 216 MG/DL    70-99        H            **Fastin

g glucose



             (test code = POC GLU)                                        normal

 <100 MG/DL-



                                                                 [employer]

es



                                                                 Assoc recommend

ation**



WHOLE BLOOD [id number] 07:45:00





             Test Item    Value        Reference Range Interpretation Comments

 

             WHOLE BLOOD GLUCOSE 276 MG/DL    70-99        H            **Fastin

g glucose



             (test code = POC GLU)                                        normal

 <100 MG/DL-



                                                                 [employer]

es



                                                                 Assoc recommend

ation**



[id number] 03:36:00





             Test Item    Value        Reference Range Interpretation Comments

 

             SODIUM (test code = 129 MMOL/L   137-145      L            



             NA)                                                 

 

             K+ (test code = 3.7 MMOL/L   3.5-5.1                   **** PLEASE 

NOTE NEW



             KSERUM)                                             REFERENCE RANGE

(S)



                                                                 IN EFFECT ****



                                                                 EFFECTIVE 

010 -



                                                                 NEW ANALYZER (V

ITROS



                                                                 5600)

 

             CHLORIDE (test code 104 MMOL/L                       



             = CL)                                               

 

             CO2 (test code = 21 MMOL/L    22-30        L            



             CO2)                                                

 

             BUN (test code = 11 MG/DL     9-20                      



             BUN)                                                

 

             CREA (test code = 0.5 MG/DL    0.8-1.5      L            



             CREA)                                               

 

             GLUCOSE (test code 251 MG/DL    70-99        H            **Fasting

 glucose



             = GLUCOSE)                                          normal <100 MG/

DL-



                                                                 [employer]

es



                                                                 Assoc



                                                                 recommendation*

*

 

             CALCIUM (test code 8.1 MG/DL    8.4-10.2     L            



             = CABLOOD)                                          

 

             TOTPROT (test code 5.2 G/DL     6.3-8.2      L            



             = TOTPROT)                                          

 

             ALBUMIN (test code 3.1 G/DL     3.5-5.0      L            



             = ALBSERUM)                                         

 

             BILITOT (test code 0.3 MG/DL    0.2-1.3                   



             = BILITOT)                                          

 

             AST (test code = 33 U/L       15-46                     



             AST)                                                

 

             PHOSALK (test code 76 U/L                           



             = PHOSALK)                                          

 

             ALT (test code = 49 U/L       13-69                     



             ALT)                                                

 

             GFR (test code = 188                                    A GFR of >9

0



             GFR)         mL/min/1.73m2                           mL/min/1.73m2 

is



                                                                 considered norm

al.



[id number] 03:36:00





             Test Item    Value        Reference Range Interpretation Comments

 

             MG (test code = MG) 1.7 mg/dL    1.6-2.3                   



[id number] 03:14:00





             Test Item    Value        Reference Range Interpretation Comments

 

             WBC (test code = 10.1 K/UL    3.5-10.9                  



             WBC)                                                

 

             RBC (test code = 3.92 M/UL    4.3-5.7      L            



             RBC)                                                

 

             HGB (test code = 13.4 G/DL    13.0-17.9                 



             HGB)                                                

 

             HCT (test code = 37.4 %       38-52        L            



             HCT)                                                

 

             MCV (test code = 95.4 FL      80-98                     



             MCV)                                                

 

             MCH (test code = 34.2 PG      28-32        H            



             MCH)                                                

 

             MCHC (test code = 35.8 G/DL    32.5-36.5                 



             MCHC)                                               

 

             RDW (test code = 12.7 %       11.5-14.5                 



             RDW)                                                

 

             PLT (test code = 223 K/UL     150-450                   



             PLT)                                                

 

             MPV (test code = 10.4 FL      7.4-10.4                  



             MPV)                                                

 

             MANDIFF (test code = NO                                     



             MANDIFF)                                            

 

             SCAN (test code = NO                                     



             SCAN)                                               

 

             NEUT% (test code = 72.0 %       40-75                     



             NEUT%)                                              

 

             LYMPH% (test code = 17.6 %       24-44        L            



             LYMPH%)                                             

 

             MONO% (test code = 9.6 %        0-13                      



             MONO%)                                              

 

             EOS% (test code = 0.2 %        0-4                       



             EOS%)                                               

 

             BASO % (test code = 0.2 %        0-2                       



             BASO%)                                              

 

             IG (test code = IG) 0 %          0-1                       

 

             IG% (test code = 0.4 %        0-1                       IG% = Metam

yelocytes,



             IG%)                                                Myelocytes, and



                                                                 Promyelocytes.



                                                                 (Immature neutr

ophils



                                                                 not including "

bands".)



                                                                 **> 3% IG indic

ates



                                                                 risk of sepsis

 

             NRBC% (test code = 0 /100 WBC                             



             NRBC%)                                              

 

             ABS NEUT (test code 7.2 K/UL     1.2-7.2                   



             = NEUT)                                             



WHOLE BLOOD [id number] 21:20:00





             Test Item    Value        Reference Range Interpretation Comments

 

             WHOLE BLOOD GLUCOSE 168 MG/DL    70-99                     **Fastin

g glucose



             (test code = POC GLU)                                        normal

 <100 MG/DL-



                                                                 [employer]

es



                                                                 Assoc recommend

ation**



WHOLE BLOOD [id number] 17:50:00





             Test Item    Value        Reference Range Interpretation Comments

 

             WHOLE BLOOD GLUCOSE 136 MG/DL    70-99        H            **Fastin

g glucose



             (test code = POC GLU)                                        normal

 <100 MG/DL-



                                                                 [facility]

es



                                                                 Assoc recommend

ation**



WHOLE BLOOD [id number] 16:20:00





             Test Item    Value        Reference Range Interpretation Comments

 

             WHOLE BLOOD GLUCOSE 216 MG/DL    70-99        H            **Fastin

g glucose



             (test code = POC GLU)                                        normal

 <100 MG/DL-



                                                                 [facility]

es



                                                                 Assoc recommend

ation**



WHOLE BLOOD [id number] 12:30:00





             Test Item    Value        Reference Range Interpretation Comments

 

             WHOLE BLOOD GLUCOSE 177 MG/DL    70-99        H            **Fastin

g glucose



             (test code = POC GLU)                                        normal

 <100 MG/DL-



                                                                 [facility]

es



                                                                 Assoc recommend

ation**



WHOLE BLOOD [id number] 12:30:00





             Test Item    Value        Reference Range Interpretation Comments

 

             WHOLE BLOOD GLUCOSE 209 MG/DL    70-99        H            **Fastin

g glucose



             (test code = POC GLU)                                        normal

 <100 MG/DL-



                                                                 [facility]

es



                                                                 Assoc recommend

ation**



WHOLE BLOOD [id number] 12:30:00





             Test Item    Value        Reference Range Interpretation Comments

 

             WHOLE BLOOD GLUCOSE 196 MG/DL    70-99        H            **Fastin

g glucose



             (test code = POC GLU)                                        normal

 <100 MG/DL-



                                                                 [facility]

es



                                                                 Assoc recommend

ation**



WHOLE BLOOD [id number] 12:25:00





             Test Item    Value        Reference Range Interpretation Comments

 

             WHOLE BLOOD GLUCOSE 185 MG/DL    70-99        H            **Fastin

g glucose



             (test code = POC GLU)                                        normal

 <100 MG/DL-



                                                                 [facility]

es



                                                                 Assoc recommend

ation**



BMP, BASIC METABOLIC [id number] 10:33:00





             Test Item    Value        Reference Range Interpretation Comments

 

             SODIUM (test code = 138 MMOL/L   137-145                   



             NA)                                                 

 

             K+ (test code = 4.3 MMOL/L   3.5-5.1                   **** PLEASE 

NOTE NEW



             KSERUM)                                             REFERENCE RANGE

(S)



                                                                 IN EFFECT ****



                                                                 EFFECTIVE 

010 -



                                                                 NEW ANALYZER (V

ITROS



                                                                 5600)

 

             CHLORIDE (test code 106 MMOL/L                       



             = CL)                                               

 

             CO2 (test code = 25 MMOL/L    22-30                     



             CO2)                                                

 

             BUN (test code = 25 MG/DL     9-20         H            



             BUN)                                                

 

             CREA (test code = 0.9 MG/DL    0.8-1.5                   



             CREA)                                               

 

             GLUCOSE (test code 165 MG/DL    70-99        H            **Fasting

 glucose



             = GLUCOSE)                                          normal <100 MG/

DL-



                                                                 [facility]

es



                                                                 Assoc



                                                                 recommendation*

*

 

             CALCIUM (test code 8.0 MG/DL    8.4-10.2     L            



             = CABLOOD)                                          

 

             GFR (test code = 95                                     A GFR of >9

0



             GFR)         mL/min/1.73m2                           mL/min/1.73m2 

is



                                                                 considered norm

al.



RESULT VERIFIED BY REPEAT ANALYSIS ON ALTERNATE INSTRUMENT.[id number] 
10:33:00





             Test Item    Value        Reference Range Interpretation Comments

 

             MG (test code = MG) 1.8 mg/dL    1.6-2.3                   



[id number] 10:33:00





             Test Item    Value        Reference Range Interpretation Comments

 

             PHOSPHOR (test code = PHOSPHOR) 2.6 MG/DL    2.5-4.5               

    



BLOOD GAS PH [id number] 08:23:00





             Test Item    Value        Reference Range Interpretation Comments

 

             BGPHVEN (test code = BGPHVEN) 7.39                                 

  



WHOLE BLOOD [id number] 08:10:00





             Test Item    Value        Reference Range Interpretation Comments

 

             WHOLE BLOOD GLUCOSE 191 MG/DL    70-99        H            **Fastin

g glucose



             (test code = POC GLU)                                        normal

 <100 MG/DL-



                                                                 [facility]

es



                                                                 Assoc recommend

ation**



ABDOMEN 2 [id number] 07:26:[address] [address]RTBAIZIQOF IMAGING REPORTPatient Name: DAGOBERTO FERMIN RDate of Service: 02-15-[birth year]66Syz:  48    Sex: M  Order #: [id number]     Room:   
[facility]: 1970     X-Ray Number: [id number] Record Number: [id number]  
   Hospital Number: [id number] Physician: Lauro DECKER Physician: 
RUTH BREAUX 2 VIEWS,  2019 10:28 PM:History:  epigastric pain.
  . Epigastric abdominal pain. Nausea andvomiting.Comparison:  None.Technique:  
2 view abdomenFindings:The bowel gas pattern is nonobstructive. There is a 
moderate colonic stoolburden. Calcifications over the central abdomen are likely
 due to chronicpancreatitis. There is no evidence of free intra-abdominal air. 
There is noevidence of organomegaly.Impression:1. Probable chronic 
pancreatitis.2. Nonobstructive bowel gas pattern without evidence of free 
air.Electronically Signed By: Gokul Max M.D., 2019 7:24 AMLegally 
authenticated by HUANG HODGSON 2019 07:24:04WHOLE BLOOD GLUCOSE
2019 06:40:00





             Test Item    Value        Reference Range Interpretation Comments

 

             WHOLE BLOOD GLUCOSE 247 MG/DL    70-99        H            **Fastin

g glucose



             (test code = POC GLU)                                        normal

 <100 MG/DL-



                                                                 [facility]

es



                                                                 Assoc recommend

ation**



WHOLE BLOOD [id number] 06:40:00





             Test Item    Value        Reference Range Interpretation Comments

 

             WHOLE BLOOD GLUCOSE 254 MG/DL    70-99        H            **Fastin

g glucose



             (test code = POC GLU)                                        normal

 <100 MG/DL-



                                                                 [facility]

es



                                                                 Assoc recommend

ation**



WHOLE BLOOD [id number] 06:40:00





             Test Item    Value        Reference Range Interpretation Comments

 

             WHOLE BLOOD GLUCOSE 311 MG/DL    70-99        H            **Fastin

g glucose



             (test code = POC GLU)                                        normal

 <100 MG/DL-



                                                                 [facility]

es



                                                                 Assoc recommend

ation**



WHOLE BLOOD [id number] 06:40:00





             Test Item    Value        Reference Range Interpretation Comments

 

             WHOLE BLOOD GLUCOSE 392 MG/DL    70-99        H            **Fastin

g glucose



             (test code = POC GLU)                                        normal

 <100 MG/DL-



                                                                 [facility]

es



                                                                 Assoc recommend

ation**



BLOOD GAS PH [id number] 04:24:00





             Test Item    Value        Reference Range Interpretation Comments

 

             BGPHVEN (test code = BGPHVEN) 7.31                                 

  



BMP, BASIC METABOLIC [id number] 04:18:00





             Test Item    Value        Reference Range Interpretation Comments

 

             SODIUM (test code = 137 MMOL/L   137-145                   



             NA)                                                 

 

             K+ (test code = 4.0 MMOL/L   3.5-5.1                   **** PLEASE 

NOTE NEW



             KSERUM)                                             REFERENCE RANGE

(S)



                                                                 IN EFFECT ****



                                                                 EFFECTIVE 

010 -



                                                                 NEW ANALYZER (V

ITROS



                                                                 5600)

 

             CHLORIDE (test code 99 MMOL/L                        



             = CL)                                               

 

             CO2 (test code = 20 MMOL/L    22-30        L            



             CO2)                                                

 

             BUN (test code = 28 MG/DL     9-20         H            



             BUN)                                                

 

             CREA (test code = 1.3 MG/DL    0.8-1.5                   



             CREA)                                               

 

             GLUCOSE (test code 359 MG/DL    70-99        H            **Fasting

 glucose



             = GLUCOSE)                                          normal <100 MG/

DL-



                                                                 [facility]

es



                                                                 Assoc



                                                                 recommendation*

*

 

             CALCIUM (test code 8.1 MG/DL    8.4-10.2     L            



             = CABLOOD)                                          

 

             GFR (test code = 62                                     A GFR of >9

0



             GFR)         mL/min/1.73m2                           mL/min/1.73m2 

is



                                                                 considered norm

al.



[id number] 04:18:00





             Test Item    Value        Reference Range Interpretation Comments

 

             MG (test code = MG) 1.9 mg/dL    1.6-2.3                   



[id number] 04:18:00





             Test Item    Value        Reference Range Interpretation Comments

 

             PHOSPHOR (test code = PHOSPHOR) 3.9 MG/DL    2.5-4.5               

    



BMP, BASIC METABOLIC [id number] 03:36:00





             Test Item    Value        Reference Range Interpretation Comments

 

             SODIUM (test code = 137 MMOL/L   137-145                   



             NA)                                                 

 

             K+ (test code = 4.1 MMOL/L   3.5-5.1                   **** PLEASE 

NOTE NEW



             KSERUM)                                             REFERENCE RANGE

(S)



                                                                 IN EFFECT ****



                                                                 EFFECTIVE 

010 -



                                                                 NEW ANALYZER (V

ITROS



                                                                 5600)

 

             CHLORIDE (test code 97 MMOL/L           L            



             = CL)                                               

 

             CO2 (test code = 18 MMOL/L    22-30        L            



             CO2)                                                

 

             BUN (test code = 28 MG/DL     9-20         H            



             BUN)                                                

 

             CREA (test code = 1.4 MG/DL    0.8-1.5                   



             CREA)                                               

 

             GLUCOSE (test code 396 MG/DL    70-99        H            **Fasting

 glucose



             = GLUCOSE)                                          normal <100 MG/

DL-



                                                                 [facility]

es



                                                                 Assoc



                                                                 recommendation*

*

 

             CALCIUM (test code 8.4 MG/DL    8.4-10.2                  



             = CABLOOD)                                          

 

             GFR (test code = 57                                     A GFR of >9

0



             GFR)         mL/min/1.73m2                           mL/min/1.73m2 

is



                                                                 considered norm

al.



[id number] 03:15:00





             Test Item    Value        Reference Range Interpretation Comments

 

             WBC (test code = 15.8 K/UL    3.5-10.9     H            



             WBC)                                                

 

             RBC (test code = 4.34 M/UL    4.3-5.7                   



             RBC)                                                

 

             HGB (test code = 14.5 G/DL    13.0-17.9                 



             HGB)                                                

 

             HCT (test code = 40.4 %       38-52                     



             HCT)                                                

 

             MCV (test code = 93.1 FL      80-98                     



             MCV)                                                

 

             MCH (test code = 33.4 PG      28-32        H            



             MCH)                                                

 

             MCHC (test code = 35.9 G/DL    32.5-36.5                 



             MCHC)                                               

 

             RDW (test code = 12.4 %       11.5-14.5                 



             RDW)                                                

 

             PLT (test code = 303 K/UL     150-450                   



             PLT)                                                

 

             MPV (test code = 10.1 FL      7.4-10.4                  



             MPV)                                                

 

             MANDIFF (test code = NO                                     



             MANDIFF)                                            

 

             SCAN (test code = NO                                     



             SCAN)                                               

 

             NEUT% (test code = 82.3 %       40-75        H            



             NEUT%)                                              

 

             LYMPH% (test code = 10.3 %       24-44        L            



             LYMPH%)                                             

 

             MONO% (test code = 6.3 %        0-13                      



             MONO%)                                              

 

             EOS% (test code = 0.0 %        0-4                       



             EOS%)                                               

 

             BASO % (test code = 0.2 %        0-2                       



             BASO%)                                              

 

             IG (test code = IG) 0 %          0-1                       

 

             IG% (test code = 0.9 %        0-1                       IG% = Metam

yelocytes,



             IG%)                                                Myelocytes, and



                                                                 Promyelocytes.



                                                                 (Immature neutr

ophils



                                                                 not including "

bands".)



                                                                 **> 3% IG indic

ates



                                                                 risk of sepsis

 

             NRBC% (test code = 0 /100 WBC                             



             NRBC%)                                              

 

             ABS NEUT (test code 13.0 K/UL    1.2-7.2      H            



             = NEUT)                                             



[id number] 03:06:00





             Test Item    Value        Reference Range Interpretation Comments

 

             WBC (test code = 13.6 K/UL    3.5-10.9     H            



             WBC)                                                

 

             RBC (test code = 4.59 M/UL    4.3-5.7                   



             RBC)                                                

 

             HGB (test code = 15.3 G/DL    13.0-17.9                 



             HGB)                                                

 

             HCT (test code = 43.8 %       38-52                     



             HCT)                                                

 

             MCV (test code = 95.4 FL      80-98                     



             MCV)                                                

 

             MCH (test code = 33.3 PG      28-32        H            



             MCH)                                                

 

             MCHC (test code = 34.9 G/DL    32.5-36.5                 



             MCHC)                                               

 

             RDW (test code = 12.6 %       11.5-14.5                 



             RDW)                                                

 

             PLT (test code = 337 K/UL     150-450                   



             PLT)                                                

 

             MPV (test code = 10.5 FL      7.4-10.4     H            



             MPV)                                                

 

             MANDIFF (test code = NO                                     



             MANDIFF)                                            

 

             SCAN (test code = NO                                     



             SCAN)                                               

 

             NEUT% (test code = 82.1 %       40-75        H            



             NEUT%)                                              

 

             LYMPH% (test code = 9.9 %        24-44        L            



             LYMPH%)                                             

 

             MONO% (test code = 6.3 %        0-13                      



             MONO%)                                              

 

             EOS% (test code = 0.0 %        0-4                       



             EOS%)                                               

 

             BASO % (test code = 0.2 %        0-2                       



             BASO%)                                              

 

             IG (test code = IG) 0 %          0-1                       

 

             IG% (test code = 1.5 %        0-1          H            IG% = Metam

yelocytes,



             IG%)                                                Myelocytes, and



                                                                 Promyelocytes.



                                                                 (Immature neutr

ophils



                                                                 not including "

bands".)



                                                                 **> 3% IG indic

ates



                                                                 risk of sepsis

 

             NRBC% (test code = 0 /100 WBC                             



             NRBC%)                                              

 

             ABS NEUT (test code 11.1 K/UL    1.2-7.2      H            



             = NEUT)                                             



WHOLE BLOOD [id number] 02:40:00





             Test Item    Value        Reference Range Interpretation Comments

 

             WHOLE BLOOD GLUCOSE 436 MG/DL    70-99        H            **Fastin

g glucose



             (test code = POC GLU)                                        normal

 <100 MG/DL-



                                                                 [facility]

es



                                                                 Assoc recommend

ation**



WHOLE BLOOD [id number] 02:40:00





             Test Item    Value        Reference Range Interpretation Comments

 

             WHOLE BLOOD GLUCOSE 518 MG/DL    70-99        HH           **Fastin

g glucose



             (test code = POC GLU)                                        normal

 <100 MG/DL-



                                                                 [employer] [facility]

es



                                                                 Assoc recommend

ation**



WHOLE BLOOD [id number] 02:40:00





             Test Item    Value        Reference Range Interpretation Comments

 

             WHOLE BLOOD GLUCOSE 545 MG/DL    70-99        HH           **Fastin

g glucose



             (test code = POC GLU)                                        normal

 <100 MG/DL-



                                                                 [facility]

es



                                                                 Assoc recommend

ation**



WHOLE BLOOD [id number] 02:40:00





             Test Item    Value        Reference Range Interpretation Comments

 

             WHOLE BLOOD GLUCOSE 540 MG/DL    70-99        HH           **Fastin

g glucose



             (test code = POC GLU)                                        normal

 <100 MG/DL-



                                                                 [employer] [facility]

es



                                                                 Assoc recommend

ation**



WHOLE BLOOD [id number] 02:40:00





             Test Item    Value        Reference Range Interpretation Comments

 

             WHOLE BLOOD GLUCOSE 573 MG/DL    70-99                     **Fastin

g glucose



             (test code = POC GLU)                                        normal

 <100 MG/DL-



                                                                 [facility]

es



                                                                 Assoc recommend

ation**



% HEMOGLOBIN A1C (GLYCATED)2019 01:51:00





             Test Item    Value        Reference Range Interpretation Comments

 

             HEMOGLOBIN A1C (test 11.1 %       0-6          H                 TH

ERAPEUTIC TARGET



             code = GLYCO-)                                        FOR THE TREAT

MENT OF



                                                                 DIABETES      M

FIDEL



                                                                 PATIENTS IS < 7

% HBA1C.



                                                                     [facility]

ABETES



                                                                 [facility]. DIABETES

 CARE



                                                                 2002;25:[id number]



BMP, BASIC METABOLIC [id number] 01:47:00





             Test Item    Value        Reference Range Interpretation Comments

 

             SODIUM (test code = 136 MMOL/L   137-145      L            



             NA)                                                 

 

             K+ (test code = 4.4 MMOL/L   3.5-5.1                   **** PLEASE 

NOTE NEW



             KSERUM)                                             REFERENCE RANGE

(S)



                                                                 IN EFFECT ****



                                                                 EFFECTIVE 

010 -



                                                                 NEW ANALYZER (V

ITROS



                                                                 5600)

 

             CHLORIDE (test code 94 MMOL/L           L            



             = CL)                                               

 

             CO2 (test code = 14 MMOL/L    22-30        L            



             CO2)                                                

 

             BUN (test code = 26 MG/DL     9-20         H            



             BUN)                                                

 

             CREA (test code = 1.5 MG/DL    0.8-1.5                   



             CREA)                                               

 

             GLUCOSE (test code 497 MG/DL    70-99        HH           **Fasting

 glucose



             = GLUCOSE)                                          normal <100 MG/

DL-



                                                                 [facility]

es



                                                                 Assoc



                                                                 recommendation*

*

 

             CALCIUM (test code 8.3 MG/DL    8.4-10.2     L            



             = CABLOOD)                                          

 

             GFR (test code = 53                                     A GFR of >9

0



             GFR)         mL/min/1.73m2                           mL/min/1.73m2 

is



                                                                 considered norm

al.



RESULTS VERIFIED.C'd TO  gasper/johny/0146/[id number] 01:47:00





             Test Item    Value        Reference Range Interpretation Comments

 

             MG (test code = MG) 2.0 mg/dL    1.6-2.3                   



[id number] 01:47:00





             Test Item    Value        Reference Range Interpretation Comments

 

             PHOSPHOR (test code = PHOSPHOR) 5.0 MG/DL    2.5-4.5      H        

    



BLOOD GAS PH [id number] 00:54:00





             Test Item    Value        Reference Range Interpretation Comments

 

             BGPHVEN (test code = BGPHVEN) 7.28                                 

  



URINE DRUG [id number] 23:32:00





             Test Item    Value        Reference Range Interpretation Comments

 

             AMPHET (test code = NEGATIVE     NEGATIVE                  This is 

an unconfirmed



             BAMP)                                               screening.  Res

ult are



                                                                 to be used for 

medical



                                                                 purposes (treat

ment)



                                                                 only.  Not inte

nded for



                                                                 non-medical pur

poses.



                                                                 Cut-off concent

ration



                                                                 for a positive 

result



                                                                 for each drug:



                                                                 Amphetamine - 1

,000



                                                                 ng/ml Barbitura

te - 200



                                                                 ng/ml Benzodiaz

epine -



                                                                 200 ng/ml Canna

binoids



                                                                 - 50 ng/ml Coca

ine -



                                                                 300 ng/ml Opiat

es - 300



                                                                 ng/ml PCP - 25 

ng/ml

 

             BARBITURATES (test NEGATIVE     NEGATIVE                  



             code = BBAR)                                        

 

             BENZO (test code = NEGATIVE     NEGATIVE                  



             BBENZ)                                              

 

             CANNABS (test code = NEGATIVE     NEGATIVE                  



             BCANN)                                              

 

             COCAINE (test code = NEGATIVE     NEGATIVE                  



             BCOC)                                               

 

             OPIATES (test code = NEGATIVE     NEGATIVE                  



             BOPI)                                               

 

             PCP (test code = NEGATIVE     NEGATIVE                  



             BMTPCP)                                             



[id number] 23:29:00





             Test Item    Value        Reference Range Interpretation Comments

 

             BETA-HYDROXYBUTYRATE (test code 16.60 mmol/L 0.02-0.27    H        

    



             = BETAHYD)                                          



[id number] 23:26:00





             Test Item    Value        Reference Range Interpretation Comments

 

             GLUCOSE (test code = URGLU) >=1000 MG/DL NEG-100                   

 

             BILIRUBN (test code = URBILI) NEGATIVE     NEGATIVE                

  

 

             KETONE (test code = URKET) >=160 MG/DL  NEGATIVE                  

 

             BLOOD (test code = URBLD) SMALL                                  

 

             UR PH (test code = URPH) 5.0          5.0-7.5                   

 

             PROTEIN (test code = URPRO) 100 MG/DL    NEGATIVE                  

 

             NITRITES (test code = URNIT) NEGATIVE     NEGATIVE                 

 

 

             UROBILINGEN (test code = URURO) 0.2 EU/DL    0.2-1.0               

    

 

             LEUKOCYT (test code = URLEU) NEGATIVE     NEGATIVE                 

 

 

             UA COLOR (test code = UA COLOR) YELLOW       YELLOW                

    

 

             CLARITY (test code = CLARITY) CLEAR        CLEAR                   

  

 

             SP GRAV (test code = URSPGRAV) 1.025        1.000-1.025            

   

 

             UAMICRO (test code = UAMICRO) NO                                   

  



ISTAT CHEM [id number] 23:20:00





             Test Item    Value        Reference Range Interpretation Comments

 

             ISTATNA (test code = 133 MMOL/L   137-145      L            



             ISTATNA)                                            

 

             ISTATK (test code = 4.5 MMOL/L   3.6-5.0                   



             ISTATK)                                             

 

             ISTATCL (test code = 92 MMOL/L           L            



             ISTATCL)                                            

 

             ISTIONCA (test code = 1.12 MMOL/L  1.12-1.32                 



             ISTIONCA)                                           

 

             ISTCO2 (test code = 13 MMOL/L    22-30        L            



             ISTCO2)                                             

 

             ISTATGLU (test code = 551 MG/DL           HH           



             ISTATGLU)                                           

 

             ISTATBUN (test code = 27.0 MG/DL   7.0-20.0     H            



             ISTATBUN)                                           

 

             ISTCREA (test code = 1.5 MG/DL    0.7-1.5                   



             ISTCREA)                                            

 

             ISTATHCT (test code = 57 %PCV      37.0-52.0    H            



             ISTATHCT)                                           

 

             ISTATHGB (test code = 19.4 G/DL    12.0-18.0    H            Notifi

ed Nurse/MD of



             ISTATHGB)                                           results outside

 of



                                                                 Reference Range

s

 

             ISTANGAP (test code = 34 MMOL/L                              Notifi

ed Nurse/MD of



             ISTANGAP)                                           results outside

 of



                                                                 Reference Range

s



LIVER [id number] 23:08:00





             Test Item    Value        Reference Range Interpretation Comments

 

             TOTPROT (test code = TOTPROT) 6.7 G/DL     6.3-8.2                 

  

 

             ALBUMIN (test code = ALBSERUM) 4.6 G/DL     3.5-5.0                

   

 

             BILITOT (test code = BILITOT) 0.8 MG/DL    0.2-1.3                 

  

 

             BILIDIR (test code = BILIDIR) 0.5 MG/DL    0.0-0.4      H          

  

 

             AST (test code = AST) 35 U/L       15-46                     

 

             PHOSALK (test code = PHOSALK) 130 U/L             H          

  

 

             ALT (test code = ALT) 82 U/L       13-69        H            



[id number] 23:08:00





             Test Item    Value        Reference Range Interpretation Comments

 

             LIPASE (test code = LIPA) 127 U/L                          



VENOUS BLOOD [id number] 23:06:00





             Test Item    Value        Reference Range Interpretation Comments

 

             SITE (test code = SITE) VENOUS       SITE                      

 

             ALLENS (test code = ALLENS) N/A                                    

 

             O2 EQUIP (test code = O2 EQUIP) RARE         O2-DEVICE             

    

 

             FIO2 (test code = FIO2) 21 %                                   

 

             PT. RR (test code = PT. RR) 19                                     

 

             PH (test code = MVPH) 7.19         7.32-7.42    L            

 

             PCO2 (test code = MVPCO2) 28 MMHG      41.0-51.0    L            

 

             PO2 (test code = MVPO2) 37 MMHG                                

 

             MVHCO3 (test code = MVHCO3) 10.7         24.0-28.0    L            

 

             BE (test code = MVBE) -16.0        -2.0-+2.0    L            

 

             THB (test code = MVTHB) 15.4 G/DL    13.5-18                   

 

             %O2 HB (test code = MV%O2 HB) 72.2 %       40.0-70.0    H          

  

 

             %COHB (test code = MV%COHB) 3.1 %                                  

 

             %MET HB (test code = MV%METHB) 0.4 %        0.4-1.5                

   



BLOOD ALCOHOL (ETOH)2019 22:59:00





             Test Item    Value        Reference Range Interpretation Comments

 

             ALCOHOL BLOOD LEVEL <10 MG/DL    0-10                      Results 

are to be used



             (test code = ALC BLD)                                        for me

dical purposes



                                                                 (treatment) onl

y. Not



                                                                 intended for no

n



                                                                 medical purpose

s.



WHOLE BLOOD [id number] 21:10:00





             Test Item    Value        Reference Range Interpretation Comments

 

             WHOLE BLOOD GLUCOSE (test code = 458 MG/DL    70-99                

     



             POC GLU)                                            



[id number] 16:45:00HEART RATE: 91 bpmRR Interval: 659 msAtrial Rate: 91 msP-R
 Interval: 94 msP Duration: 84 msP Horizontal Axis: 225 degP Front Axis: 264 
degQ Onset: 499 msQRSD Interval: 91 msQT Interval: 409 msQTcB: 504msQTcF: 470 
msQRS Horizontal Axis: -87 degQRS Axis: 100 degI-40 Horizontal Axis: 4 degI-40 
Front Axis: 70 degT-40 Horizontal Axis: 262 degT-40 Front Axis: 108 degT 
Horizontal Axis: 94 degT Wave Axis: 97 degS-T Horizontal Axis: 100 degS-T Front 
Axis: 82 degECG Severity: - ABNORMAL ECG -ECG Impression: Ectopic atrial 
rhythmECG Impression: Right axis deviationECG Impression: Nonspecific T 
abnormalities,lateral leadsECG Impression: Prolonged QT intervalWHOLE BLOOD 
[id number] 14:30:00





             Test Item    Value        Reference Range Interpretation Comments

 

             WHOLE BLOOD GLUCOSE (test code = 293 MG/DL    70-99                

     



             POC GLU)                                            



BMP, BASIC METABOLIC [id number] 06:41:00





             Test Item    Value        Reference Range Interpretation Comments

 

             SODIUM (test code = 129 MMOL/L   137-145      L            



             NA)                                                 

 

             K+ (test code = 4.2 MMOL/L   3.5-5.1                   **** PLEASE 

NOTE NEW



             KSERUM)                                             REFERENCE RANGE

(S)



                                                                 IN EFFECT ****



                                                                 EFFECTIVE 

010 -



                                                                 NEW ANALYZER (V

ITROS



                                                                 5600)

 

             CHLORIDE (test code 95 MMOL/L           L            



             = CL)                                               

 

             CO2 (test code = 21 MMOL/L    22-30        L            



             CO2)                                                

 

             BUN (test code = 7 MG/DL      9-20         L            



             BUN)                                                

 

             CREA (test code = 0.5 MG/DL    0.8-1.5      L            



             CREA)                                               

 

             GLUCOSE (test code 252 MG/DL    70-99        H            **Fasting

 glucose



             = GLUCOSE)                                          normal <100 MG/

DL-



                                                                 [employer] [facility]

es



                                                                 Assoc



                                                                 recommendation*

*

 

             CALCIUM (test code 8.1 MG/DL    8.4-10.2     L            



             = CABLOOD)                                          

 

             GFR (test code = 188                                    A GFR of >9

0



             GFR)         mL/min/1.73m2                           mL/min/1.73m2 

is



                                                                 considered norm

al.



[id number] 06:40:00





             Test Item    Value        Reference Range Interpretation Comments

 

             BETA-HYDROXYBUTYRATE (test code = 6.06 mmol/L  0.02-0.27    H      

      



             BETAHYD)                                            



[id number] 06:40:00





             Test Item    Value        Reference Range Interpretation Comments

 

             MG (test code = MG) 1.7 mg/dL    1.6-2.3                   



[id number] 06:40:00





             Test Item    Value        Reference Range Interpretation Comments

 

             PHOSPHOR (test code = PHOSPHOR) 2.5 MG/DL    2.5-4.5               

    



[id number] 05:53:00





             Test Item    Value        Reference Range Interpretation Comments

 

             WBC (test code = 5.6 K/UL     3.5-10.9                  



             WBC)                                                

 

             RBC (test code = 4.07 M/UL    4.3-5.7      L            



             RBC)                                                

 

             HGB (test code = 13.7 G/DL    13.0-17.9                 



             HGB)                                                

 

             HCT (test code = 40.6 %       38-52                     



             HCT)                                                

 

             MCV (test code = 99.8 FL      80-98        H            



             MCV)                                                

 

             MCH (test code = 33.7 PG      28-32        H            



             MCH)                                                

 

             MCHC (test code = 33.7 G/DL    32.5-36.5                 



             MCHC)                                               

 

             RDW (test code = 13.4 %       11.5-14.5                 



             RDW)                                                

 

             PLT (test code = 181 K/UL     150-450                   



             PLT)                                                

 

             MPV (test code = 11.4 FL      7.4-10.4     H            



             MPV)                                                

 

             MANDIFF (test code = NO                                     



             MANDIFF)                                            

 

             SCAN (test code = NO                                     



             SCAN)                                               

 

             NEUT% (test code = 59.2 %       40-75                     



             NEUT%)                                              

 

             LYMPH% (test code = 26.4 %       24-44                     



             LYMPH%)                                             

 

             MONO% (test code = 12.9 %       0-13                      



             MONO%)                                              

 

             EOS% (test code = 0.4 %        0-4                       



             EOS%)                                               

 

             BASO % (test code = 0.4 %        0-2                       



             BASO%)                                              

 

             IG (test code = IG) 0 %          0-1                       

 

             IG% (test code = 0.7 %        0-1                       IG% = Metam

yelocytes,



             IG%)                                                Myelocytes, and



                                                                 Promyelocytes.



                                                                 (Immature neutr

ophils



                                                                 not including "

bands".)



                                                                 **> 3% IG indic

ates



                                                                 risk of sepsis

 

             NRBC% (test code = 0 /100 WBC                             



             NRBC%)                                              

 

             ABS NEUT (test code 3.3 K/UL     1.2-7.2                   



             = NEUT)                                             



WHOLE BLOOD [id number] 21:20:00





             Test Item    Value        Reference Range Interpretation Comments

 

             WHOLE BLOOD GLUCOSE (test code = 296 MG/DL    70-99        H       

     



             POC GLU)                                            



WHOLE BLOOD [id number] 17:20:00





             Test Item    Value        Reference Range Interpretation Comments

 

             WHOLE BLOOD GLUCOSE (test code = 206 MG/DL    70-99                

     



             POC GLU)                                            



WHOLE BLOOD [id number] 11:50:00





             Test Item    Value        Reference Range Interpretation Comments

 

             WHOLE BLOOD GLUCOSE (test code = 220 MG/DL    70-99                

     



             POC GLU)                                            



WHOLE BLOOD [id number] 08:10:00





             Test Item    Value        Reference Range Interpretation Comments

 

             WHOLE BLOOD GLUCOSE (test code = 281 MG/DL    70-99                

     



             POC GLU)                                            



[id number] 06:57:00





             Test Item    Value        Reference Range Interpretation Comments

 

             WBC (test code = 9.9 K/UL     3.5-10.9                  



             WBC)                                                

 

             RBC (test code = 4.00 M/UL    4.3-5.7      L            



             RBC)                                                

 

             HGB (test code = 13.4 G/DL    13.0-17.9                 



             HGB)                                                

 

             HCT (test code = 38.6 %       38-52                     



             HCT)                                                

 

             MCV (test code = 96.5 FL      80-98                     



             MCV)                                                

 

             MCH (test code = 33.5 PG      28-32        H            



             MCH)                                                

 

             MCHC (test code = 34.7 G/DL    32.5-36.5                 



             MCHC)                                               

 

             RDW (test code = 13.3 %       11.5-14.5                 



             RDW)                                                

 

             PLT (test code = 269 K/UL     150-450                   CHECKED x2



             PLT)                                                

 

             MPV (test code = 10.2 FL      7.4-10.4                  



             MPV)                                                

 

             MANDIFF (test code = NO                                     



             MANDIFF)                                            

 

             SCAN (test code = NO                                     



             SCAN)                                               

 

             NEUT% (test code = 74.1 %       40-75                     



             NEUT%)                                              

 

             LYMPH% (test code = 13.1 %       24-44        L            



             LYMPH%)                                             

 

             MONO% (test code = 12.0 %       0-13                      



             MONO%)                                              

 

             EOS% (test code = 0.1 %        0-4                       



             EOS%)                                               

 

             BASO % (test code = 0.3 %        0-2                       



             BASO%)                                              

 

             IG (test code = IG) 0 %          0-1                       

 

             IG% (test code = 0.4 %        0-1                       IG% = Metam

yelocytes,



             IG%)                                                Myelocytes, and



                                                                 Promyelocytes.



                                                                 (Immature neutr

ophils



                                                                 not including "

bands".)



                                                                 **> 3% IG indic

ates



                                                                 risk of sepsis

 

             NRBC% (test code = 0 /100 WBC                             



             NRBC%)                                              

 

             ABS NEUT (test code 7.3 K/UL     1.2-7.2      H            



             = NEUT)                                             



BMP, BASIC METABOLIC [id number] 06:29:00





             Test Item    Value        Reference Range Interpretation Comments

 

             SODIUM (test code = 133 MMOL/L   137-145      L            



             NA)                                                 

 

             K+ (test code = 4.3 MMOL/L   3.5-5.1                   **** PLEASE 

NOTE NEW



             KSERUM)                                             REFERENCE RANGE

(S)



                                                                 IN EFFECT ****



                                                                 EFFECTIVE 

010 -



                                                                 NEW ANALYZER (V

ITROS



                                                                 5600)

 

             CHLORIDE (test code 99 MMOL/L                        



             = CL)                                               

 

             CO2 (test code = 18 MMOL/L    22-30        L            



             CO2)                                                

 

             BUN (test code = 13 MG/DL     9-20                      



             BUN)                                                

 

             CREA (test code = 0.5 MG/DL    0.8-1.5      L            



             CREA)                                               

 

             GLUCOSE (test code 163 MG/DL    70-99        H            **Fasting

 glucose



             = GLUCOSE)                                          normal <100 MG/

DL-



                                                                 [employer]

es



                                                                 Assoc



                                                                 recommendation*

*

 

             CALCIUM (test code 7.7 MG/DL    8.4-10.2     L            



             = CABLOOD)                                          

 

             GFR (test code = 188                                    A GFR of >9

0



             GFR)         mL/min/1.73m2                           mL/min/1.73m2 

is



                                                                 considered norm

al.



[id number] 06:29:00





             Test Item    Value        Reference Range Interpretation Comments

 

             PHOSPHOR (test code = PHOSPHOR) 2.2 MG/DL    2.5-4.5      L        

    



WHOLE BLOOD [id number] 21:40:00





             Test Item    Value        Reference Range Interpretation Comments

 

             WHOLE BLOOD GLUCOSE (test code = 239 MG/DL    70-99                

     



             POC GLU)                                            



BMP, BASIC METABOLIC [id number] 20:32:00





             Test Item    Value        Reference Range Interpretation Comments

 

             SODIUM (test code = 134 MMOL/L   137-145      L            



             NA)                                                 

 

             K+ (test code = 4.4 MMOL/L   3.5-5.1                   **** PLEASE 

NOTE NEW



             KSERUM)                                             REFERENCE RANGE

(S)



                                                                 IN EFFECT ****



                                                                 EFFECTIVE 

010 -



                                                                 NEW ANALYZER (V

ITROS



                                                                 5600)

 

             CHLORIDE (test code 96 MMOL/L           L            



             = CL)                                               

 

             CO2 (test code = 16 MMOL/L    22-30        L            



             CO2)                                                

 

             BUN (test code = 16 MG/DL     9-20                      



             BUN)                                                

 

             CREA (test code = 0.7 MG/DL    0.8-1.5      L            



             CREA)                                               

 

             GLUCOSE (test code 268 MG/DL    70-99        H            **Fasting

 glucose



             = GLUCOSE)                                          normal <100 MG/

DL-



                                                                 [employer]

es



                                                                 Assoc



                                                                 recommendation*

*

 

             CALCIUM (test code 7.6 MG/DL    8.4-10.2     L            



             = CABLOOD)                                          

 

             GFR (test code = 127                                    A GFR of >9

0



             GFR)         mL/min/1.73m2                           mL/min/1.73m2 

is



                                                                 considered norm

al.



[id number] 20:32:00





             Test Item    Value        Reference Range Interpretation Comments

 

             MG (test code = MG) 1.5 mg/dL    1.6-2.3      L            



[id number] 20:32:00





             Test Item    Value        Reference Range Interpretation Comments

 

             PHOSPHOR (test code = PHOSPHOR) 3.1 MG/DL    2.5-4.5               

    



BLOOD GAS PH [id number] 20:10:00





             Test Item    Value        Reference Range Interpretation Comments

 

             BGPHVEN (test code = BGPHVEN) 7.40                                 

  



WHOLE BLOOD [id number] 19:10:00





             Test Item    Value        Reference Range Interpretation Comments

 

             WHOLE BLOOD GLUCOSE (test code = 279 MG/DL    70-99        H       

     



             POC GLU)                                            



WHOLE BLOOD [id number] 18:30:00





             Test Item    Value        Reference Range Interpretation Comments

 

             WHOLE BLOOD GLUCOSE (test code = 289 MG/DL    70-99        H       

     



             POC GLU)                                            



[id number] 17:12:00





             Test Item    Value        Reference Range Interpretation Comments

 

             BETA-HYDROXYBUTYRATE (test code 10.30 mmol/L 0.02-0.27    H        

    



             = BETAHYD)                                          



ER SCREEN FOR HIV  16:48:00





             Test Item    Value        Reference Range Interpretation Comments

 

             HIV 1/2 AB (test NEGATIVE     NEGATIVE                  This test i

s used for



             code = SCRN HIV)                                        SCREENING p

urposes only.



                                                                 All reactive re

sults are



                                                                 prelimenary and



                                                                 confirmation re

sults will



                                                                 follow.



[id number] 15:32:00





             Test Item    Value        Reference Range Interpretation Comments

 

             GLUCOSE (test code = URGLU) >=1000 MG/DL NEG-100                   

 

             BILIRUBN (test code = URBILI) NEGATIVE     NEGATIVE                

  

 

             KETONE (test code = URKET) 80 MG/DL     NEGATIVE                  

 

             BLOOD (test code = URBLD) MODERATE                               

 

             UR PH (test code = URPH) 5.5          5.0-7.5                   

 

             PROTEIN (test code = URPRO) 30 MG/DL     NEGATIVE                  

 

             NITRITES (test code = URNIT) NEGATIVE     NEGATIVE                 

 

 

             UROBILINGEN (test code = URURO) 0.2 EU/DL    0.2-1.0               

    

 

             LEUKOCYT (test code = URLEU) NEGATIVE     NEGATIVE                 

 

 

             UA COLOR (test code = UA COLOR) YELLOW       YELLOW                

    

 

             CLARITY (test code = CLARITY) CLEAR        CLEAR                   

  

 

             SP GRAV (test code = URSPGRAV) 1.030        1.000-1.025  H         

   

 

             UAMICRO (test code = UAMICRO) YES                                  

  

 

             WBC (test code = URWBC) 1 /HPF       0-5                       

 

             RBC (test code = URRBC) 5 /HPF       0-2          H            

 

             CASTS (test code = CAST) 1 /LPF       0-3                       

 

             UR EPI (test code = EPI) 4 /LPF                                 

 

             BACTERIA (test code = BACTERIA) NEGATIVE     NONE                  

    



LIVER [id number] 14:29:00





             Test Item    Value        Reference Range Interpretation Comments

 

             TOTPROT (test code = TOTPROT) 7.2 G/DL     6.3-8.2                 

  

 

             ALBUMIN (test code = ALBSERUM) 4.8 G/DL     3.5-5.0                

   

 

             BILITOT (test code = BILITOT) 0.8 MG/DL    0.2-1.3                 

  

 

             BILIDIR (test code = BILIDIR) 0.4 MG/DL    0.0-0.4                 

  

 

             AST (test code = AST) 34 U/L       15-46                     

 

             PHOSALK (test code = PHOSALK) 124 U/L                        

  

 

             ALT (test code = ALT) 68 U/L       13-69                     



[id number] 14:28:00





             Test Item    Value        Reference Range Interpretation Comments

 

             LIPASE (test code = LIPA) 48 U/L                           



ISTAT [id number] [id number] 14:20:00





             Test Item    Value        Reference Range Interpretation Comments

 

             ISTATNA (test code = ISTATNA) 132 MMOL/L   137-145      L          

  

 

             ISTATK (test code = ISTATK) 4.4 MMOL/L   3.6-5.0                   

 

             ISTATCL (test code = ISTATCL) 95 MMOL/L           L          

  

 

             ISTIONCA (test code = ISTIONCA) 1.00 MMOL/L  1.12-1.32    L        

    

 

             ISTCO2 (test code = ISTCO2) 16 MMOL/L    22-30        L            

 

             ISTATGLU (test code = ISTATGLU) 417 MG/DL           H        

    

 

             ISTATBUN (test code = ISTATBUN) 20.0 MG/DL   7.0-20.0              

    

 

             ISTCREA (test code = ISTCREA) 0.9 MG/DL    0.7-1.5                 

  

 

             ISTATHCT (test code = ISTATHCT) 49 %PCV      37.0-52.0             

    

 

             ISTATHGB (test code = ISTATHGB) 16.7 G/DL    12.0-18.0             

    

 

             ISTANGAP (test code = ISTANGAP) 26 MMOL/L                          

    



[id number] 14:03:00





             Test Item    Value        Reference Range Interpretation Comments

 

             WBC (test code = 6.4 K/UL     3.5-10.9                  



             WBC)                                                

 

             RBC (test code = 4.60 M/UL    4.3-5.7                   



             RBC)                                                

 

             HGB (test code = 15.5 G/DL    13.0-17.9                 



             HGB)                                                

 

             HCT (test code = 44.9 %       38-52                     



             HCT)                                                

 

             MCV (test code = 97.6 FL      80-98                     



             MCV)                                                

 

             MCH (test code = 33.7 PG      28-32        H            



             MCH)                                                

 

             MCHC (test code = 34.5 G/DL    32.5-36.5                 



             MCHC)                                               

 

             RDW (test code = 13.2 %       11.5-14.5                 



             RDW)                                                

 

             PLT (test code = 355 K/UL     150-450                   



             PLT)                                                

 

             MPV (test code = 9.6 FL       7.4-10.4                  



             MPV)                                                

 

             MANDIFF (test code = NO                                     



             MANDIFF)                                            

 

             SCAN (test code = NO                                     



             SCAN)                                               

 

             NEUT% (test code = 75.0 %       40-75                     



             NEUT%)                                              

 

             LYMPH% (test code = 17.0 %       24-44        L            



             LYMPH%)                                             

 

             MONO% (test code = 6.6 %        0-13                      



             MONO%)                                              

 

             EOS% (test code = 0.0 %        0-4                       



             EOS%)                                               

 

             BASO % (test code = 0.8 %        0-2                       



             BASO%)                                              

 

             IG (test code = IG) 0 %          0-1                       

 

             IG% (test code = 0.6 %        0-1                       IG% = Metam

yelocytes,



             IG%)                                                Myelocytes, and



                                                                 Promyelocytes.



                                                                 (Immature neutr

ophils



                                                                 not including "

bands".)



                                                                 **> 3% IG indic

ates



                                                                 risk of sepsis

 

             NRBC% (test code = 0 /100 WBC                             



             NRBC%)                                              

 

             ABS NEUT (test code 4.8 K/UL     1.2-7.2                   



             = NEUT)                                             



WHOLE BLOOD [id number] 12:05:00





             Test Item    Value        Reference Range Interpretation Comments

 

             WHOLE BLOOD GLUCOSE (test code = 231 MG/DL    70-99        H       

     



             POC GLU)                                            



BMP, BASIC METABOLIC [id number] 07:31:00





             Test Item    Value        Reference Range Interpretation Comments

 

             SODIUM (test code = 131 MMOL/L   137-145      L            



             NA)                                                 

 

             K+ (test code = 3.7 MMOL/L   3.5-5.1                   **** PLEASE 

NOTE NEW



             KSERUM)                                             REFERENCE RANGE

(S)



                                                                 IN EFFECT ****



                                                                 EFFECTIVE 8/5/2

010 -



                                                                 NEW ANALYZER (V

ITROS



                                                                 5600)

 

             CHLORIDE (test code 97 MMOL/L           L            



             = CL)                                               

 

             CO2 (test code = 27 MMOL/L    22-30                     



             CO2)                                                

 

             BUN (test code = 5 MG/DL      9-20         L            



             BUN)                                                

 

             CREA (test code = 0.4 MG/DL    0.8-1.5      L            



             CREA)                                               

 

             GLUCOSE (test code 290 MG/DL    70-99        H            **Fasting

 glucose



             = GLUCOSE)                                          normal <100 MG/

DL-



                                                                 [employer]

es



                                                                 Assoc



                                                                 recommendation*

*

 

             CALCIUM (test code 8.5 MG/DL    8.4-10.2                  



             = CABLOOD)                                          

 

             GFR (test code = 243                                    A GFR of >9

0



             GFR)         mL/min/1.73m2                           mL/min/1.73m2 

is



                                                                 considered norm

al.



[id number] 07:13:00HEART RATE: 85 bpmRR Interval: 706 msAtrial Rate: 86 msP-R
 Interval: 134 msP Duration: 123 msP Horizontal Axis: -90 degP Front Axis: 64 
degQ Onset: 502 msQRSD Interval: 88 msQT Interval: 426 msQTcB: 507 msQTcF: 478 
msQRS Horizontal Axis: -90 degQRS Axis: 90 degI-40 Horizontal Axis: 5 degI-40 
Front Axis: 79 degT-40 Horizontal Axis: 252 degT-40 Front Axis: 92 degT 
Horizontal Axis: 60 degT Wave Axis: 78degS-T Horizontal Axis: 101 degS-T Front 
Axis: 73 degECG Severity: - ABNORMAL ECG -ECG Impression: Sinus rhythmECG 
Impression: Consider left ventricular hypertrophyECG Impression: Prolonged QT 
[id number] 07:11:00





             Test Item    Value        Reference Range Interpretation Comments

 

             WBC (test code = 4.5 K/UL     3.5-10.9                  



             WBC)                                                

 

             RBC (test code = 4.07 M/UL    4.3-5.7      L            



             RBC)                                                

 

             HGB (test code = 13.5 G/DL    13.0-17.9                 



             HGB)                                                

 

             HCT (test code = 39.5 %       38-52                     



             HCT)                                                

 

             MCV (test code = 97.1 FL      80-98                     



             MCV)                                                

 

             MCH (test code = 33.2 PG      28-32        H            



             MCH)                                                

 

             MCHC (test code = 34.2 G/DL    32.5-36.5                 



             MCHC)                                               

 

             RDW (test code = 12.5 %       11.5-14.5                 



             RDW)                                                

 

             PLT (test code = 167 K/UL     150-450                   



             PLT)                                                

 

             MPV (test code = 10.1 FL      7.4-10.4                  



             MPV)                                                

 

             MANDIFF (test code = NO                                     



             MANDIFF)                                            

 

             SCAN (test code = NO                                     



             SCAN)                                               

 

             NEUT% (test code = 58.7 %       40-75                     



             NEUT%)                                              

 

             LYMPH% (test code = 30.8 %       24-44                     



             LYMPH%)                                             

 

             MONO% (test code = 9.0 %        0-13                      



             MONO%)                                              

 

             EOS% (test code = 0.9 %        0-4                       



             EOS%)                                               

 

             BASO % (test code = 0.4 %        0-2                       



             BASO%)                                              

 

             IG (test code = IG) 0 %          0-1                       

 

             IG% (test code = 0.2 %        0-1                       IG% = Metam

yelocytes,



             IG%)                                                Myelocytes, and



                                                                 Promyelocytes.



                                                                 (Immature neutr

ophils



                                                                 not including "

bands".)



                                                                 **> 3% IG indic

ates



                                                                 risk of sepsis

 

             NRBC% (test code = 0 /100 WBC                             



             NRBC%)                                              

 

             ABS NEUT (test code 2.7 K/UL     1.2-7.2                   



             = NEUT)                                             



WHOLE BLOOD [id number] 06:30:00





             Test Item    Value        Reference Range Interpretation Comments

 

             WHOLE BLOOD GLUCOSE (test code = 295 MG/DL    70-99        H       

     



             POC GLU)                                            



WHOLE BLOOD [id number] 22:10:00





             Test Item    Value        Reference Range Interpretation Comments

 

             WHOLE BLOOD GLUCOSE (test code = 215 MG/DL    70-99        H       

     



             POC GLU)                                            



WHOLE BLOOD [id number] 17:25:00





             Test Item    Value        Reference Range Interpretation Comments

 

             WHOLE BLOOD GLUCOSE (test code = 209 MG/DL    70-99                

     



             POC GLU)                                            



ABDOMEN 2 [id number] 17:12:[address] [id number] IMAGING REPORTPatient Name: DAGOBERTO FERMIN RDate of Service: [id number]:  48    Sex: M  Order #: [id number]     Room:   
[facility]  2NDOB: 1970     X-Ray Number: [id number] Record Number: 
[id number]     Hospital Number: [id number] Physician: John MORELing Physician: MOREL, VINCENTABDOMEN 2 VIEWS at 1644 hours 2019:CLINICAL HISTORY: Vomiting and diarrhea; right upper quadrant 
abdominalpainTECHNIQUE: Supine and erect views were obtainedFINDINGS: The gas 
pattern is unremarkable, however, there is moderate fecalresidue seen throughout
 the colon to the level the rectum consistent withobstipation.there is no 
evidence of an impaction at the present time.There is no evidence for radiopaque
 calculi.Electronically Signed By: Steven Lyman M.D., 2019 5:09 
PMLegally authenticated by KEISHA BELL 2019 17:09:57WHOLE BLOOD 
[id number] 12:25:00





             Test Item    Value        Reference Range Interpretation Comments

 

             WHOLE BLOOD GLUCOSE (test code = 318 MG/DL    70-99                

     



             POC GLU)                                            



[id number] 07:32:00





             Test Item    Value        Reference Range Interpretation Comments

 

             MG (test code = MG) 1.7 mg/dL    1.6-2.3                   



[id number] 07:32:00





             Test Item    Value        Reference Range Interpretation Comments

 

             PHOSPHOR (test code = PHOSPHOR) 2.6 MG/DL    2.5-4.5               

    



AMYLASE, [id number] 07:32:00





             Test Item    Value        Reference Range Interpretation Comments

 

             AMYLASE (test code = AMYLBLD) <30 U/L                        

  



[id number] 07:32:00





             Test Item    Value        Reference Range Interpretation Comments

 

             SODIUM (test code = 130 MMOL/L   137-145      L            



             NA)                                                 

 

             K+ (test code = 4.1 MMOL/L   3.5-5.1                   **** PLEASE 

NOTE NEW



             KSERUM)                                             REFERENCE RANGE

(S)



                                                                 IN EFFECT ****



                                                                 EFFECTIVE 

010 -



                                                                 NEW ANALYZER (V

ITROS



                                                                 5600)

 

             CHLORIDE (test code 95 MMOL/L           L            



             = CL)                                               

 

             CO2 (test code = 26 MMOL/L    22-30                     



             CO2)                                                

 

             BUN (test code = 7 MG/DL      9-20         L            



             BUN)                                                

 

             CREA (test code = 0.4 MG/DL    0.8-1.5      L            



             CREA)                                               

 

             GLUCOSE (test code 282 MG/DL    70-99        H            **Fasting

 glucose



             = GLUCOSE)                                          normal <100 MG/

DL-



                                                                 [employer]

es



                                                                 Assoc



                                                                 recommendation*

*

 

             CALCIUM (test code 8.2 MG/DL    8.4-10.2     L            



             = CABLOOD)                                          

 

             TOTPROT (test code 5.2 G/DL     6.3-8.2      L            



             = TOTPROT)                                          

 

             ALBUMIN (test code 3.0 G/DL     3.5-5.0      L            



             = ALBSERUM)                                         

 

             BILITOT (test code 0.9 MG/DL    0.2-1.3                   



             = BILITOT)                                          

 

             AST (test code = 46 U/L       15-46                     



             AST)                                                

 

             PHOSALK (test code 99 U/L                           



             = PHOSALK)                                          

 

             ALT (test code = 59 U/L       13-69                     



             ALT)                                                

 

             GFR (test code = 243                                    A GFR of >9

0



             GFR)         mL/min/1.73m2                           mL/min/1.73m2 

is



                                                                 considered norm

al.



[id number] 07:32:00





             Test Item    Value        Reference Range Interpretation Comments

 

             LIPASE (test code = LIPA) 16 U/L              L            



[id number] 06:52:00





             Test Item    Value        Reference Range Interpretation Comments

 

             WBC (test code = 6.3 K/UL     3.5-10.9                  



             WBC)                                                

 

             RBC (test code = 4.49 M/UL    4.3-5.7                   



             RBC)                                                

 

             HGB (test code = 14.7 G/DL    13.0-17.9                 



             HGB)                                                

 

             HCT (test code = 43.8 %       38-52                     



             HCT)                                                

 

             MCV (test code = 97.6 FL      80-98                     



             MCV)                                                

 

             MCH (test code = 32.7 PG      28-32        H            



             MCH)                                                

 

             MCHC (test code = 33.6 G/DL    32.5-36.5                 



             MCHC)                                               

 

             RDW (test code = 12.6 %       11.5-14.5                 



             RDW)                                                

 

             PLT (test code = 176 K/UL     150-450                   



             PLT)                                                

 

             MPV (test code = 10.1 FL      7.4-10.4                  



             MPV)                                                

 

             MANDIFF (test code = NO                                     



             MANDIFF)                                            

 

             SCAN (test code = NO                                     



             SCAN)                                               

 

             NEUT% (test code = 61.8 %       40-75                     



             NEUT%)                                              

 

             LYMPH% (test code = 29.1 %       24-44                     



             LYMPH%)                                             

 

             MONO% (test code = 7.5 %        0-13                      



             MONO%)                                              

 

             EOS% (test code = 0.8 %        0-4                       



             EOS%)                                               

 

             BASO % (test code = 0.3 %        0-2                       



             BASO%)                                              

 

             IG (test code = IG) 0 %          0-1                       

 

             IG% (test code = 0.5 %        0-1                       IG% = Metam

yelocytes,



             IG%)                                                Myelocytes, and



                                                                 Promyelocytes.



                                                                 (Immature neutr

ophils



                                                                 not including "

bands".)



                                                                 **> 3% IG indic

ates



                                                                 risk of sepsis

 

             NRBC% (test code = 0 /100 WBC                             



             NRBC%)                                              

 

             ABS NEUT (test code 3.9 K/UL     1.2-7.2                   



             = NEUT)                                             



WHOLE BLOOD [id number] 06:00:00





             Test Item    Value        Reference Range Interpretation Comments

 

             WHOLE BLOOD GLUCOSE (test code = 325 MG/DL    70-99        H       

     



             POC GLU)                                            



[id number] 22:12:00





             Test Item    Value        Reference Range Interpretation Comments

 

             PHOSPHOR (test code = PHOSPHOR) 2.5 MG/DL    2.5-4.5               

    



WHOLE BLOOD [id number] 20:50:00





             Test Item    Value        Reference Range Interpretation Comments

 

             WHOLE BLOOD GLUCOSE (test code = 123 MG/DL    70-99        H       

     



             POC GLU)                                            



WHOLE BLOOD [id number] 17:00:00





             Test Item    Value        Reference Range Interpretation Comments

 

             WHOLE BLOOD GLUCOSE (test code = 196 MG/DL    70-99        H       

     



             POC GLU)                                            



WHOLE BLOOD [id number] 13:35:00





             Test Item    Value        Reference Range Interpretation Comments

 

             WHOLE BLOOD GLUCOSE (test code = 142 MG/DL    70-99        H       

     



             POC GLU)                                            



WHOLE BLOOD [id number] 08:25:00





             Test Item    Value        Reference Range Interpretation Comments

 

             WHOLE BLOOD GLUCOSE (test code = 192 MG/DL    70-99        H       

     



             POC GLU)                                            



WHOLE BLOOD [id number] 08:25:00





             Test Item    Value        Reference Range Interpretation Comments

 

             WHOLE BLOOD GLUCOSE (test code = 198 MG/DL    70-99                

     



             POC GLU)                                            



US LIMITED ABD [id number] 07:14:00[address] [address]VAVISOUMSY IMAGING REPORTPatient Name:
 DAGOBERTO FERMIN RDate of Service: [id number]:  48    Sex: M  Order #: [id number]     
[facility] 1970     [facility]Ray Number: [id number] Record Number: 
[id number]     Hospital Number: [id number] Physician: CULLEN KEY 
Physician: FERREIRA, LOC TANUS LIMITED ABD ULTRASOUND  3/31/2019 6:55 PMHistory:  
Abdominal pain with nausea and vomiting.. Right upper quadrantabdominal pain.C
omparison:  NoneTechnique: Transabdominal grayscale, color Doppler, and spectral
 Dopplerimaging of the right upper quadrant of the abdomen was 
performed.Findings:The liver is normal in size. The liver is diffusely echogenic
 reflectinghepatic steatosis. The main portal vein is patent with color Doppler
signaldemonstrating normal directional flow. There is normal venous 
spectralDoppler phasicity.The gallbladder is distended and normal. The common 
bile duct measures 5mm.The pancreas is poorly visualized due to overlying bowel 
gas. There appearto be echogenic calcifications within the pancreas reflecting 
chronicpancreatitis.The right kidney is normal.Impression:1. Hepatic 
steatosis.2. Pancreatic calcifications reflecting chronic 
pancreatitis.Electronically Signed By: Gokul Max M.D., 2019 7:12 
AMLegally authenticated by HUANG HODGSON 2019 07:12:30WHOLE BLOOD 
[id number] 06:10:00





             Test Item    Value        Reference Range Interpretation Comments

 

             WHOLE BLOOD GLUCOSE (test code = 182 MG/DL    70-99        H       

     



             POC GLU)                                            



BMP, BASIC METABOLIC [id number] 05:28:00





             Test Item    Value        Reference Range Interpretation Comments

 

             SODIUM (test code = 135 MMOL/L   137-145      L            



             NA)                                                 

 

             K+ (test code = 3.9 MMOL/L   3.5-5.1                   **** PLEASE 

NOTE NEW



             KSERUM)                                             REFERENCE RANGE

(S)



                                                                 IN EFFECT ****



                                                                 EFFECTIVE 

010 -



                                                                 NEW ANALYZER (V

ITROS



                                                                 5600)

 

             CHLORIDE (test code 100 MMOL/L                       



             = CL)                                               

 

             CO2 (test code = 26 MMOL/L    22-30                     



             CO2)                                                

 

             BUN (test code = 9 MG/DL      9-20                      



             BUN)                                                

 

             CREA (test code = 0.5 MG/DL    0.8-1.5      L            



             CREA)                                               

 

             GLUCOSE (test code 195 MG/DL    70-99        H            **Fasting

 glucose



             = GLUCOSE)                                          normal <100 MG/

DL-



                                                                 [employer]

es



                                                                 Assoc



                                                                 recommendation*

*

 

             CALCIUM (test code 8.3 MG/DL    8.4-10.2     L            



             = CABLOOD)                                          

 

             GFR (test code = 188                                    A GFR of >9

0



             GFR)         mL/min/1.73m2                           mL/min/1.73m2 

is



                                                                 considered norm

al.



[id number] 05:28:00





             Test Item    Value        Reference Range Interpretation Comments

 

             MG (test code = MG) 1.7 mg/dL    1.6-2.3                   



[id number] 05:28:00





             Test Item    Value        Reference Range Interpretation Comments

 

             PHOSPHOR (test code = PHOSPHOR) 1.6 MG/DL    2.5-4.5      L        

    



[id number] 05:22:00





             Test Item    Value        Reference Range Interpretation Comments

 

             WBC (test code = 5.7 K/UL     3.5-10.9                  



             WBC)                                                

 

             RBC (test code = 4.25 M/UL    4.3-5.7      L            



             RBC)                                                

 

             HGB (test code = 14.2 G/DL    13.0-17.9                 



             HGB)                                                

 

             HCT (test code = 40.4 %       38-52                     



             HCT)                                                

 

             MCV (test code = 95.1 FL      80-98                     



             MCV)                                                

 

             MCH (test code = 33.4 PG      28-32        H            



             MCH)                                                

 

             MCHC (test code = 35.1 G/DL    32.5-36.5                 



             MCHC)                                               

 

             RDW (test code = 12.7 %       11.5-14.5                 



             RDW)                                                

 

             PLT (test code = 200 K/UL     150-450                   



             PLT)                                                

 

             MPV (test code = 9.5 FL       7.4-10.4                  



             MPV)                                                

 

             MANDIFF (test code = NO                                     



             MANDIFF)                                            

 

             SCAN (test code = NO                                     



             SCAN)                                               

 

             NEUT% (test code = 58.7 %       40-75                     



             NEUT%)                                              

 

             LYMPH% (test code = 28.5 %       24-44                     



             LYMPH%)                                             

 

             MONO% (test code = 11.8 %       0-13                      



             MONO%)                                              

 

             EOS% (test code = 0.2 %        0-4                       



             EOS%)                                               

 

             BASO % (test code = 0.4 %        0-2                       



             BASO%)                                              

 

             IG (test code = IG) 0 %          0-1                       

 

             IG% (test code = 0.4 %        0-1                       IG% = Metam

yelocytes,



             IG%)                                                Myelocytes, and



                                                                 Promyelocytes.



                                                                 (Immature neutr

ophils



                                                                 not including "

bands".)



                                                                 **> 3% IG indic

ates



                                                                 risk of sepsis

 

             NRBC% (test code = 0 /100 WBC                             



             NRBC%)                                              

 

             ABS NEUT (test code 3.3 K/UL     1.2-7.2                   



             = NEUT)                                             



BLOOD GAS PH [id number] 05:04:00





             Test Item    Value        Reference Range Interpretation Comments

 

             BGPHVEN (test code = BGPHVEN) 7.43                                 

  



BG LAB VENOUS [id number] 05:04:00





             Test Item    Value        Reference Range Interpretation Comments

 

             SITE (test code = SITE) VENOUS       SITE                      

 

             BGLACVEN (test code = BGLACVEN) 10.0 mg/dL   6.0-18.0              

    



WHOLE BLOOD [id number] 04:15:00





             Test Item    Value        Reference Range Interpretation Comments

 

             WHOLE BLOOD GLUCOSE (test code = 196 MG/DL    70-99        H       

     



             POC GLU)                                            



WHOLE BLOOD [id number] 03:25:00





             Test Item    Value        Reference Range Interpretation Comments

 

             WHOLE BLOOD GLUCOSE (test code = 187 MG/DL    70-99        H       

     



             POC GLU)                                            



WHOLE BLOOD [id number] 02:00:00





             Test Item    Value        Reference Range Interpretation Comments

 

             WHOLE BLOOD GLUCOSE (test code = 211 MG/DL    70-99        H       

     



             POC GLU)                                            



[id number] 00:43:00





             Test Item    Value        Reference Range Interpretation Comments

 

             SODIUM (test code = 135 MMOL/L   137-145      L            



             NA)                                                 

 

             K+ (test code = 4.3 MMOL/L   3.5-5.1                   **** PLEASE 

NOTE NEW



             KSERUM)                                             REFERENCE RANGE

(S)



                                                                 IN EFFECT ****



                                                                 EFFECTIVE 

010 -



                                                                 NEW ANALYZER (V

ITROS



                                                                 5600)

 

             CHLORIDE (test code 97 MMOL/L           L            



             = CL)                                               

 

             CO2 (test code = 23 MMOL/L    22-30                     



             CO2)                                                

 

             BUN (test code = 10 MG/DL     9-20                      



             BUN)                                                

 

             CREA (test code = 0.6 MG/DL    0.8-1.5      L            



             CREA)                                               

 

             GLUCOSE (test code 220 MG/DL    70-99        H            **Fasting

 glucose



             = GLUCOSE)                                          normal <100 MG/

DL-



                                                                 [employer]

es



                                                                 Assoc



                                                                 recommendation*

*

 

             CALCIUM (test code 8.9 MG/DL    8.4-10.2                  



             = CABLOOD)                                          

 

             TOTPROT (test code 6.1 G/DL     6.3-8.2      L            



             = TOTPROT)                                          

 

             ALBUMIN (test code 4.0 G/DL     3.5-5.0                   



             = ALBSERUM)                                         

 

             BILITOT (test code 1.4 MG/DL    0.2-1.3      H            



             = BILITOT)                                          

 

             AST (test code = 55 U/L       15-46        H            



             AST)                                                

 

             PHOSALK (test code 122 U/L                          



             = PHOSALK)                                          

 

             ALT (test code = 61 U/L       13-69                     



             ALT)                                                

 

             GFR (test code = 152                                    A GFR of >9

0



             GFR)         mL/min/1.73m2                           mL/min/1.73m2 

is



                                                                 considered norm

al.



BLOOD GAS [id number] 00:25:00





             Test Item    Value        Reference Range Interpretation Comments

 

             SITE (test code = SITE) RT RAD       SITE                      

 

             ALLENS (test code = ALLENS) POS                                    

 

             O2 EQUIP (test code = O2 EQUIP) RARE         O2-DEVICE             

    

 

             PH (test code = BGPH) 7.42         7.35-7.45                 

 

             PCO2 (test code = PCO2) 32 MMHG      34.0-45.0    L            

 

             PO2 (test code = PO2) 85 MMHG      88-96        L            

 

             HCO3 (test code = HCO3) 20.8 mmol/L  22.0-26.0    L            

 

             BE (test code = BE) -2.7 mmol/L  -2.0-2.0     L            

 

             THB (test code = THB) 14.5 G/DL    13.5-18                   

 

             % 02 HB (test code = ABGSAT) 93.5 %       96.0-100.0   L           

 

 

             %COHB (test code = BGCO) 2.3 %        <1.5         H            

 

             % MET HB (test code = %MET HB) 0.6 %        0.4-1.5                

   

 

             CAO2 (test code = CAO2) 19.1 VOL%    17.6-24.3                 

 

             PF/RATIO (test code = PF/RATIO) 405.0                              

    



WHOLE BLOOD [id number] 22:25:00





             Test Item    Value        Reference Range Interpretation Comments

 

             WHOLE BLOOD GLUCOSE (test code = 185 MG/DL    70-99        H       

     



             POC GLU)                                            



WHOLE BLOOD [id number] 21:05:00





             Test Item    Value        Reference Range Interpretation Comments

 

             WHOLE BLOOD GLUCOSE (test code = 229 MG/DL    70-99        H       

     



             POC GLU)                                            



BLOOD GAS [id number] 19:40:00





             Test Item    Value        Reference Range Interpretation Comments

 

             SITE (test code = SITE) RT RAD       SITE                      

 

             ALLENS (test code = ALLENS) POS                                    

 

             O2 EQUIP (test code = O2 EQUIP) ROOM AIR     O2-DEVICE             

    

 

             PH (test code = BGPH) 7.42         7.35-7.45                 

 

             PCO2 (test code = PCO2) 35 MMHG      34.0-45.0                 

 

             PO2 (test code = PO2) 90 MMHG      88-96                     

 

             HCO3 (test code = HCO3) 22.7 mmol/L  22.0-26.0                 

 

             BE (test code = BE) -1.2 mmol/L  -2.0-2.0                  

 

             THB (test code = THB) 14.5 G/DL    13.5-18                   

 

             % 02 HB (test code = ABGSAT) 92.7 %       96.0-100.0   L           

 

 

             %COHB (test code = BGCO) 3.3 %        <1.5         H            

 

             % MET HB (test code = %MET HB) 0.6 %        0.4-1.5                

   

 

             CAO2 (test code = CAO2) 19.0 VOL%    17.6-24.3                 

 

             PF/RATIO (test code = PF/RATIO) 429.0                              

    



WHOLE BLOOD [id number] 19:30:00





             Test Item    Value        Reference Range Interpretation Comments

 

             WHOLE BLOOD GLUCOSE (test code = 253 MG/DL    70-99        H       

     



             POC GLU)                                            



[id number] 19:29:00





             Test Item    Value        Reference Range Interpretation Comments

 

             MG (test code = MG) 1.9 mg/dL    1.6-2.3                   



[id number] 19:29:00





             Test Item    Value        Reference Range Interpretation Comments

 

             PHOSPHOR (test code = PHOSPHOR) 2.5 MG/DL    2.5-4.5               

    



[id number] 19:28:00





             Test Item    Value        Reference Range Interpretation Comments

 

             BETA-HYDROXYBUTYRATE (test code = 5.31 mmol/L  0.02-0.27    H      

      



             BETAHYD)                                            



[id number] 19:28:00





             Test Item    Value        Reference Range Interpretation Comments

 

             LIPASE (test code = LIPA) 28 U/L                           



[id number] 19:28:00





             Test Item    Value        Reference Range Interpretation Comments

 

             SODIUM (test code = 136 MMOL/L   137-145      L            



             NA)                                                 

 

             K+ (test code = 4.2 MMOL/L   3.5-5.1                   **** PLEASE 

NOTE NEW



             KSERUM)                                             REFERENCE RANGE

(S)



                                                                 IN EFFECT ****



                                                                 EFFECTIVE 

010 -



                                                                 NEW ANALYZER (V

ITROS



                                                                 5600)

 

             CHLORIDE (test code 96 MMOL/L           L            



             = CL)                                               

 

             CO2 (test code = 22 MMOL/L    22-30                     



             CO2)                                                

 

             BUN (test code = 11 MG/DL     9-20                      



             BUN)                                                

 

             CREA (test code = 0.5 MG/DL    0.8-1.5      L            



             CREA)                                               

 

             GLUCOSE (test code 267 MG/DL    70-99        H            **Fasting

 glucose



             = GLUCOSE)                                          normal <100 MG/

DL-



                                                                 [employer]

es



                                                                 Assoc



                                                                 recommendation*

*

 

             CALCIUM (test code 8.9 MG/DL    8.4-10.2                  



             = CABLOOD)                                          

 

             TOTPROT (test code 6.8 G/DL     6.3-8.2                   



             = TOTPROT)                                          

 

             ALBUMIN (test code 4.3 G/DL     3.5-5.0                   



             = ALBSERUM)                                         

 

             BILITOT (test code 0.9 MG/DL    0.2-1.3                   



             = BILITOT)                                          

 

             AST (test code = 71 U/L       15-46        H            



             AST)                                                

 

             PHOSALK (test code 125 U/L                          



             = PHOSALK)                                          

 

             ALT (test code = 63 U/L       13-69                     



             ALT)                                                

 

             GFR (test code = 188                                    A GFR of >9

0



             GFR)         mL/min/1.73m2                           mL/min/1.73m2 

is



                                                                 considered norm

al.



[id number] 19:19:00





             Test Item    Value        Reference Range Interpretation Comments

 

             WBC (test code = 8.2 K/UL     3.5-10.9                  



             WBC)                                                

 

             RBC (test code = 4.68 M/UL    4.3-5.7                   



             RBC)                                                

 

             HGB (test code = 15.4 G/DL    13.0-17.9                 



             HGB)                                                

 

             HCT (test code = 45.0 %       38-52                     



             HCT)                                                

 

             MCV (test code = 96.2 FL      80-98                     



             MCV)                                                

 

             MCH (test code = 32.9 PG      28-32        H            



             MCH)                                                

 

             MCHC (test code = 34.2 G/DL    32.5-36.5                 



             MCHC)                                               

 

             RDW (test code = 12.9 %       11.5-14.5                 



             RDW)                                                

 

             PLT (test code = 248 K/UL     150-450                   



             PLT)                                                

 

             MPV (test code = 9.5 FL       7.4-10.4                  



             MPV)                                                

 

             MANDIFF (test code = NO                                     



             MANDIFF)                                            

 

             SCAN (test code = NO                                     



             SCAN)                                               

 

             NEUT% (test code = 73.5 %       40-75                     



             NEUT%)                                              

 

             LYMPH% (test code = 16.0 %       24-44        L            



             LYMPH%)                                             

 

             MONO% (test code = 9.7 %        0-13                      



             MONO%)                                              

 

             EOS% (test code = 0.0 %        0-4                       



             EOS%)                                               

 

             BASO % (test code = 0.4 %        0-2                       



             BASO%)                                              

 

             IG (test code = IG) 0 %          0-1                       

 

             IG% (test code = 0.4 %        0-1                       IG% = Metam

yelocytes,



             IG%)                                                Myelocytes, and



                                                                 Promyelocytes.



                                                                 (Immature neutr

ophils



                                                                 not including "

bands".)



                                                                 **> 3% IG indic

ates



                                                                 risk of sepsis

 

             NRBC% (test code = 0 /100 WBC                             



             NRBC%)                                              

 

             ABS NEUT (test code 6.0 K/UL     1.2-7.2                   



             = NEUT)                                             



[id number] 19:00:00





             Test Item    Value        Reference Range Interpretation Comments

 

             GLUCOSE (test code = URGLU) >=1000 MG/DL NEG-100                   

 

             BILIRUBN (test code = URBILI) NEGATIVE     NEGATIVE                

  

 

             KETONE (test code = URKET) 80 MG/DL     NEGATIVE                  

 

             BLOOD (test code = URBLD) LARGE                                  

 

             UR PH (test code = URPH) 6.0          5.0-7.5                   

 

             PROTEIN (test code = URPRO) 300 MG/DL    NEGATIVE                  

 

             NITRITES (test code = URNIT) NEGATIVE     NEGATIVE                 

 

 

             UROBILINGEN (test code = URURO) 0.2 EU/DL    0.2-1.0               

    

 

             LEUKOCYT (test code = URLEU) NEGATIVE     NEGATIVE                 

 

 

             UA COLOR (test code = UA COLOR) YELLOW       YELLOW                

    

 

             CLARITY (test code = CLARITY) CLEAR        CLEAR                   

  

 

             SP GRAV (test code = URSPGRAV) 1.043        1.000-1.025  H         

   

 

             UAMICRO (test code = UAMICRO) YES                                  

  

 

             WBC (test code = URWBC) 1 /HPF       0-5                       

 

             RBC (test code = URRBC) 94 /HPF      0-2          H            

 

             CASTS (test code = CAST) 2 /LPF       0-3                       

 

             UR EPI (test code = EPI) 10 /LPF                                

 

             BACTERIA (test code = BACTERIA) NEGATIVE     NONE                  

    



CHEST 1 VIEW [id number] 17:40:[address] [id number] IMAGING REPORTPatient Name: DAGOBERTO FERMIN RDate of Service: [id number]:  48    Sex: M  Order #: [id number]     Room:   
[facility]: 1970     X-Ray Number: [id number] Record Number: [id number]  
   Hospital Number: [id number] Physician: CULLEN KEY Physician: 
ROHAN FERREIRA 2 views 3/31/2019History: Abdominal pain, shortness of 
breath, possible DKATechnique: AP upright and supine images were obt
ained.Comparison: CT of 2016Findings:Bowel gas pattern is nonspecific with 
some stool scattered in the colon.There is no free air. Bones and soft tissues 
show no acute process.Numerous punctate calcifications are again scattered in 
the pancreas.Impression:Possible mild ileus without bowel obstruction. Mild 
fecal stasis.Chest 1 view 3/31/2019History: As aboveComparison: 2019Cardiac,
 hilar, andmediastinal structures are within normal limits forsize. 
Calcification is seen in the aorta. Lungs are well-aerated and clear.No acute 
bony or soft tissue abnormalities are identified.Impression:Clear c
hest.Electronically Signed By: Babar Porras M.D., 3/31/2019 5:37 PMLegally 
authenticated by REJI MERAZ 2019 17:37:50ABDOMEN 2 [id number] 
17:40:[address] 
[id number] IMAGING REPORTPatient Name: DAGOBERTO FERMIN RDate of Service: 
91-29-0333Elj:  48    Sex: M  Order #: [id number]     Room:   ERDOB: 1970     
X-Ray Number: [id number] Record Number: [id number]     Hospital Number: 
[id number] Physician: CULLEN KEY Physician: ROHAN FERREIRA 2 views 3/31/2019History: Abdominal pain, shortness of breath, 
possible DKATechnique: AP upright and supine images were obtained.Comparison: CT
 of 2016Findings:Bowel gas pattern is nonspecific with some stool scattered 
in the colon.There is no free air. Bones and soft tissues show no acute 
process.Numerous punctate calcifications are again scattered in the 
pancreas.Impression:Possible mild ileus without bowel obstruction. Mild fecal 
stasis.Chest 1 view 3/31/2019History: As aboveComparison: 2019Cardiac, 
hilar, andmediastinal structures are within normal limits forsize. Calcification
 is seen in the aorta. Lungs are well-aerated and clear.No acute bony or soft 
tissue abnormalities are identified.Impression:Clear chest.Electronically Signed
 By: Babar Porras M.D., 3/31/2019 5:37 PMLegally authenticated by REJI MERAZ 
2019 17:37:50CT HEAD W/O [id number] 12:27:[address] [id number] IMAGING REPORTPat
ient Name: DAGOBERTO FERMIN RDate of Service: 59-04-5118Bys:  48    Sex: M  Order #:
 [id number]     Room:   [facility]: 1970     X-Ray Number: [id number] Record 
Number: [id number]     Hospital Number: [id number] Physician: CULLEN KEY Physician: Constantine DECKER CT brain without contrast 3
/30/2019History: Fall on 3/29/2019 now with complaints of vomiting, blurred 
visionand laceration/swelling around the left eyeComparison: 2016This CT 
exam was performed using one or more of the following dosereduction techniques: 
Automated exposure control, adjustment of the mAand/or kV according to patient 
size, or use of iterative reconstructiontechnique.Axial images were obtained 
through the brain without contrast.Visualized portions of the orbits, sinuses, 
mastoids, scalp, and skull showno clearly suspicious acute process.There is no 
acute intracranial hemorrhage, infarct, mass, shift, edema, orhydrocephalus. A 
punctate, benign calcification in the left basal gangliais stable.Impression:No
suspicious acute process or adverse change.Electronically Signed By: Babar Porras M.D., 3/30/2019 12:24 PMLegally authenticated by REJI MERAZ 2019 12:24:43
WHOLE BLOOD [id number] 11:25:00





             Test Item    Value        Reference Range Interpretation Comments

 

             WHOLE BLOOD GLUCOSE (test code = 157 MG/DL    70-99        H       

     



             POC GLU)                                            



BMP, BASIC METABOLIC [id number] 09:22:00





             Test Item    Value        Reference Range Interpretation Comments

 

             SODIUM (test code = 132 MMOL/L   137-145      L            



             NA)                                                 

 

             K+ (test code = 4.1 MMOL/L   3.5-5.1                   **** PLEASE 

NOTE NEW



             KSERUM)                                             REFERENCE RANGE

(S)



                                                                 IN EFFECT ****



                                                                 EFFECTIVE 

010 -



                                                                 NEW ANALYZER (V

ITROS



                                                                 5600)

 

             CHLORIDE (test code 100 MMOL/L                       



             = CL)                                               

 

             CO2 (test code = 26 MMOL/L    22-30                     



             CO2)                                                

 

             BUN (test code = 9 MG/DL      9-20                      



             BUN)                                                

 

             CREA (test code = 0.4 MG/DL    0.8-1.5      L            



             CREA)                                               

 

             GLUCOSE (test code 227 MG/DL    70-99        H            **Fasting

 glucose



             = GLUCOSE)                                          normal <100 MG/

DL-



                                                                 [employer]

es



                                                                 Assoc



                                                                 recommendation*

*

 

             CALCIUM (test code 8.4 MG/DL    8.4-10.2                  



             = CABLOOD)                                          

 

             GFR (test code = 243                                    A GFR of >9

0



             GFR)         mL/min/1.73m2                           mL/min/1.73m2 

is



                                                                 considered norm

al.



WHOLE BLOOD [id number] 07:40:00





             Test Item    Value        Reference Range Interpretation Comments

 

             WHOLE BLOOD GLUCOSE (test code = 227 MG/DL    70-99        H       

     



             POC GLU)                                            



[id number] 06:58:00





             Test Item    Value        Reference Range Interpretation Comments

 

             WBC (test code = 5.5 K/UL     3.5-10.9                  



             WBC)                                                

 

             RBC (test code = 3.94 M/UL    4.3-5.7      L            



             RBC)                                                

 

             HGB (test code = 13.2 G/DL    13.0-17.9                 



             HGB)                                                

 

             HCT (test code = 39.6 %       38-52                     



             HCT)                                                

 

             MCV (test code = 100.5 FL     80-98        H            



             MCV)                                                

 

             MCH (test code = 33.5 PG      28-32        H            



             MCH)                                                

 

             MCHC (test code = 33.3 G/DL    32.5-36.5                 



             MCHC)                                               

 

             RDW (test code = 13.1 %       11.5-14.5                 



             RDW)                                                

 

             PLT (test code = 205 K/UL     150-450                   



             PLT)                                                

 

             MPV (test code = 9.5 FL       7.4-10.4                  



             MPV)                                                

 

             MANDIFF (test code = NO                                     



             MANDIFF)                                            

 

             SCAN (test code = NO                                     



             SCAN)                                               

 

             NEUT% (test code = 58.8 %       40-75                     



             NEUT%)                                              

 

             LYMPH% (test code = 30.0 %       24-44                     



             LYMPH%)                                             

 

             MONO% (test code = 9.3 %        0-13                      



             MONO%)                                              

 

             EOS% (test code = 0.9 %        0-4                       



             EOS%)                                               

 

             BASO % (test code = 0.5 %        0-2                       



             BASO%)                                              

 

             IG (test code = IG) 0 %          0-1                       

 

             IG% (test code = 0.5 %        0-1                       IG% = Metam

yelocytes,



             IG%)                                                Myelocytes, and



                                                                 Promyelocytes.



                                                                 (Immature neutr

ophils



                                                                 not including "

bands".)



                                                                 **> 3% IG indic

ates



                                                                 risk of sepsis

 

             NRBC% (test code = 0 /100 WBC                             



             NRBC%)                                              

 

             ABS NEUT (test code 3.2 K/UL     1.2-7.2                   



             = NEUT)                                             



WHOLE BLOOD [id number] 21:10:00





             Test Item    Value        Reference Range Interpretation Comments

 

             WHOLE BLOOD GLUCOSE (test code = 117 MG/DL    70-99        H       

     



             POC GLU)                                            



WHOLE BLOOD [id number] 19:50:00





             Test Item    Value        Reference Range Interpretation Comments

 

             WHOLE BLOOD GLUCOSE (test code = 182 MG/DL    70-99        H       

     



             POC GLU)                                            



WHOLE BLOOD [id number] 19:50:00





             Test Item    Value        Reference Range Interpretation Comments

 

             WHOLE BLOOD GLUCOSE (test code = 467 MG/DL    70-99        HH      

     



             POC GLU)                                            



[id number] 18:13:00HEART RATE: 92 bpmRR Interval: 652 msAtrial Rate: 93 msP-R
 Interval: 92 msP Duration: 80 msP Horizontal Axis: 168 degP Front Axis: 267 
degQ Onset: 499 msQRSD Interval: 90 msQT Interval: 392 msQTcB: 485msQTcF: 452 
msQRS Horizontal Axis: -79 degQRS Axis: 99 degI-40 Horizontal Axis: -18 degI-40 
Front Axis: 80 degT-40 Horizontal Axis: 255 degT-40 Front Axis: 96 degT 
Horizontal Axis: 22 degT Wave Axis: 81 degS-T Horizontal Axis: 58 degS-T Front 
Axis: 28 degECG Severity: - BORDERLINE ECG -ECG Impression:Ectopic atrial rhythm
WHOLE BLOOD [id number] 06:50:00





             Test Item    Value        Reference Range Interpretation Comments

 

             WHOLE BLOOD GLUCOSE (test code = 225 MG/DL    70-99        H       

     



             POC GLU)                                            



WHOLE BLOOD [id number] 03:50:00





             Test Item    Value        Reference Range Interpretation Comments

 

             WHOLE BLOOD GLUCOSE (test code = 211 MG/DL    70-99        H       

     



             POC GLU)                                            



WHOLE BLOOD [id number] 23:45:00





             Test Item    Value        Reference Range Interpretation Comments

 

             WHOLE BLOOD GLUCOSE (test code = 197 MG/DL    70-99        H       

     



             POC GLU)                                            



BMP, BASIC METABOLIC [id number] 22:26:00





             Test Item    Value        Reference Range Interpretation Comments

 

             SODIUM (test code = 132 MMOL/L   137-145      L            



             NA)                                                 

 

             K+ (test code = 4.4 MMOL/L   3.5-5.1                   **** PLEASE 

NOTE NEW



             KSERUM)                                             REFERENCE RANGE

(S)



                                                                 IN EFFECT ****



                                                                 EFFECTIVE 

010 -



                                                                 NEW ANALYZER (V

ITROS



                                                                 5600)

 

             CHLORIDE (test code 103 MMOL/L                       



             = CL)                                               

 

             CO2 (test code = 24 MMOL/L    22-30                     



             CO2)                                                

 

             BUN (test code = 14 MG/DL     9-20                      



             BUN)                                                

 

             CREA (test code = 0.5 MG/DL    0.8-1.5      L            



             CREA)                                               

 

             GLUCOSE (test code 197 MG/DL    70-99        H            **Fasting

 glucose



             = GLUCOSE)                                          normal <100 MG/

DL-



                                                                 [employer]

es



                                                                 Assoc



                                                                 recommendation*

*

 

             CALCIUM (test code 7.7 MG/DL    8.4-10.2     L            



             = CABLOOD)                                          

 

             GFR (test code = 188                                    A GFR of >9

0



             GFR)         mL/min/1.73m2                           mL/min/1.73m2 

is



                                                                 considered norm

al.



[id number] 22:26:00





             Test Item    Value        Reference Range Interpretation Comments

 

             MG (test code = MG) 2.1 mg/dL    1.6-2.3                   



[id number] 22:26:00





             Test Item    Value        Reference Range Interpretation Comments

 

             PHOSPHOR (test code = PHOSPHOR) 2.3 MG/DL    2.5-4.5      L        

    



BLOOD GAS PH [id number] 21:19:00





             Test Item    Value        Reference Range Interpretation Comments

 

             BGPHVEN (test code = BGPHVEN) 7.37                                 

  



WHOLE BLOOD [id number] 20:55:00





             Test Item    Value        Reference Range Interpretation Comments

 

             WHOLE BLOOD GLUCOSE (test code = 192 MG/DL    70-99        H       

     



             POC GLU)                                            



WHOLE BLOOD [id number] 19:55:00





             Test Item    Value        Reference Range Interpretation Comments

 

             WHOLE BLOOD GLUCOSE (test code = 191 MG/DL    70-99        H       

     



             POC GLU)                                            



WHOLE BLOOD [id number] 17:55:00





             Test Item    Value        Reference Range Interpretation Comments

 

             WHOLE BLOOD GLUCOSE (test code = 217 MG/DL    70-99        H       

     



             POC GLU)                                            



BMP, BASIC METABOLIC [id number] 17:42:00





             Test Item    Value        Reference Range Interpretation Comments

 

             SODIUM (test code = 136 MMOL/L   137-145      L            



             NA)                                                 

 

             K+ (test code = 4.4 MMOL/L   3.5-5.1                   **** PLEASE 

NOTE NEW



             KSERUM)                                             REFERENCE RANGE

(S)



                                                                 IN EFFECT ****



                                                                 EFFECTIVE 

010 -



                                                                 NEW ANALYZER (V

ITROS



                                                                 5600)

 

             CHLORIDE (test code 102 MMOL/L                       



             = CL)                                               

 

             CO2 (test code = 19 MMOL/L    22-30        L            



             CO2)                                                

 

             BUN (test code = 17 MG/DL     9-20                      



             BUN)                                                

 

             CREA (test code = 0.7 MG/DL    0.8-1.5      L            



             CREA)                                               

 

             GLUCOSE (test code 262 MG/DL    70-99        H            **Fasting

 glucose



             = GLUCOSE)                                          normal <100 MG/

DL-



                                                                 [employer]

es



                                                                 Assoc



                                                                 recommendation*

*

 

             CALCIUM (test code 8.6 MG/DL    8.4-10.2                  



             = CABLOOD)                                          

 

             GFR (test code = 127                                    A GFR of >9

0



             GFR)         mL/min/1.73m2                           mL/min/1.73m2 

is



                                                                 considered norm

al.



[id number] 17:42:00





             Test Item    Value        Reference Range Interpretation Comments

 

             MG (test code = MG) 1.6 mg/dL    1.6-2.3                   



[id number] 17:42:00





             Test Item    Value        Reference Range Interpretation Comments

 

             PHOSPHOR (test code = PHOSPHOR) 2.5 MG/DL    2.5-4.5               

    



WHOLE BLOOD [id number] 16:50:00





             Test Item    Value        Reference Range Interpretation Comments

 

             WHOLE BLOOD GLUCOSE (test code = 245 MG/DL    70-99        H       

     



             POC GLU)                                            



WHOLE BLOOD [id number] 16:50:00





             Test Item    Value        Reference Range Interpretation Comments

 

             WHOLE BLOOD GLUCOSE (test code = 273 MG/DL    70-99        H       

     



             POC GLU)                                            



WHOLE BLOOD [id number] 16:50:00





             Test Item    Value        Reference Range Interpretation Comments

 

             WHOLE BLOOD GLUCOSE (test code = 283 MG/DL    70-99        H       

     



             POC GLU)                                            



WHOLE BLOOD [id number] 16:50:00





             Test Item    Value        Reference Range Interpretation Comments

 

             WHOLE BLOOD GLUCOSE (test code = 300 MG/DL    70-99        H       

     



             POC GLU)                                            



WHOLE BLOOD [id number] 16:50:00





             Test Item    Value        Reference Range Interpretation Comments

 

             WHOLE BLOOD GLUCOSE (test code = 351 MG/DL    70-99        H       

     



             POC GLU)                                            



WHOLE BLOOD [id number] 16:50:00





             Test Item    Value        Reference Range Interpretation Comments

 

             WHOLE BLOOD GLUCOSE (test code = 469 MG/DL    70-99                

     



             POC GLU)                                            



VENOUS BLOOD [id number] 16:29:00





             Test Item    Value        Reference Range Interpretation Comments

 

             SITE (test code = SITE) VENOUS       SITE                      

 

             ALLENS (test code = ALLENS) N/A                                    

 

             O2 EQUIP (test code = O2 EQUIP) N/A          O2-DEVICE             

    

 

             PH (test code = MVPH) 7.35         7.32-7.42                 

 

             PCO2 (test code = MVPCO2) 37 MMHG      41.0-51.0    L            

 

             PO2 (test code = MVPO2) 31 MMHG                                

 

             MVHCO3 (test code = MVHCO3) 20.4         24.0-28.0    L            

 

             BE (test code = MVBE) -4.6         -2.0-+2.0    L            

 

             THB (test code = MVTHB) 14.2 G/DL    13.5-18                   

 

             %O2 HB (test code = MV%O2 HB) 64.3 %       40.0-70.0               

  

 

             %COHB (test code = MV%COHB) 2.0 %                                  

 

             %MET HB (test code = MV%METHB) 0.6 %        0.4-1.5                

   



[id number] 12:04:00





             Test Item    Value        Reference Range Interpretation Comments

 

             GLUCOSE (test code = URGLU) >=1000 MG/DL NEG-100                   

 

             BILIRUBN (test code = URBILI) NEGATIVE     NEGATIVE                

  

 

             KETONE (test code = URKET) >1=160 MG/DL NEGATIVE                  

 

             BLOOD (test code = URBLD) MODERATE                               

 

             UR PH (test code = URPH) 5.0          5.0-7.5                   

 

             PROTEIN (test code = URPRO) 100 MG/DL    NEGATIVE                  

 

             NITRITES (test code = URNIT) NEGATIVE     NEGATIVE                 

 

 

             UROBILINGEN (test code = URURO) 0.2 EU/DL    0.2-1.0               

    

 

             LEUKOCYT (test code = URLEU) NEGATIVE     NEGATIVE                 

 

 

             UA COLOR (test code = UA COLOR) YELLOW       YELLOW                

    

 

             CLARITY (test code = CLARITY) CLEAR        CLEAR                   

  

 

             SP GRAV (test code = URSPGRAV) 1.025        1.000-1.025            

   

 

             UAMICRO (test code = UAMICRO) YES                                  

  

 

             WBC (test code = URWBC) 1 /HPF       0-5                       

 

             RBC (test code = URRBC) 2 /HPF       0-2                       

 

             CASTS (test code = CAST) 0 /LPF       0-3                       

 

             UR EPI (test code = EPI) 4 /LPF                                 

 

             BACTERIA (test code = BACTERIA) NEGATIVE     NONE                  

    



[id number] 11:41:00





             Test Item    Value        Reference Range Interpretation Comments

 

             BETA-HYDROXYBUTYRATE (test code >18.0 mmol/L 0.02-0.27    H        

    



             = BETAHYD)                                          



[id number] 11:13:00





             Test Item    Value        Reference Range Interpretation Comments

 

             WBC (test code = 8.7 K/UL     3.5-10.9                  



             WBC)                                                

 

             RBC (test code = 4.76 M/UL    4.3-5.7                   



             RBC)                                                

 

             HGB (test code = 16.0 G/DL    13.0-17.9                 



             HGB)                                                

 

             HCT (test code = 47.7 %       38-52                     



             HCT)                                                

 

             MCV (test code = 100.2 FL     80-98        H            



             MCV)                                                

 

             MCH (test code = 33.6 PG      28-32        H            



             MCH)                                                

 

             MCHC (test code = 33.5 G/DL    32.5-36.5                 



             MCHC)                                               

 

             RDW (test code = 13.4 %       11.5-14.5                 



             RDW)                                                

 

             PLT (test code = 351 K/UL     150-450                   



             PLT)                                                

 

             MPV (test code = 10.6 FL      7.4-10.4     H            



             MPV)                                                

 

             MANDIFF (test code = NO                                     



             MANDIFF)                                            

 

             SCAN (test code = NO                                     



             SCAN)                                               

 

             NEUT% (test code = 83.4 %       40-75        H            



             NEUT%)                                              

 

             LYMPH% (test code = 9.4 %        24-44        L            



             LYMPH%)                                             

 

             MONO% (test code = 4.5 %        0-13                      



             MONO%)                                              

 

             EOS% (test code = 0.1 %        0-4                       



             EOS%)                                               

 

             BASO % (test code = 0.5 %        0-2                       



             BASO%)                                              

 

             IG (test code = IG) 0 %          0-1                       

 

             IG% (test code = 2.1 %        0-1          H            IG% = Metam

yelocytes,



             IG%)                                                Myelocytes, and



                                                                 Promyelocytes.



                                                                 (Immature neutr

ophils



                                                                 not including "

bands".)



                                                                 **> 3% IG indic

ates



                                                                 risk of sepsis

 

             NRBC% (test code = 0 /100 WBC                             



             NRBC%)                                              

 

             ABS NEUT (test code 7.2 K/UL     1.2-7.2                   



             = NEUT)                                             



[id number] 11:10:00





             Test Item    Value        Reference Range Interpretation Comments

 

             LIPASE (test code = LIPA) 100 U/L                          



LIVER [id number] 11:10:00





             Test Item    Value        Reference Range Interpretation Comments

 

             TOTPROT (test code = TOTPROT) 8.1 G/DL     6.3-8.2                 

  

 

             ALBUMIN (test code = ALBSERUM) 5.5 G/DL     3.5-5.0      H         

   

 

             BILITOT (test code = BILITOT) 0.9 MG/DL    0.2-1.3                 

  

 

             BILIDIR (test code = BILIDIR) 0.5 MG/DL    0.0-0.4      H          

  

 

             AST (test code = AST) 45 U/L       15-46                     

 

             PHOSALK (test code = PHOSALK) 285 U/L             H          

  

 

             ALT (test code = ALT) 290 U/L      13-69        H            



ISTAT CHEM [id number] 10:40:00





             Test Item    Value        Reference Range Interpretation Comments

 

             ISTATNA (test code = ISTATNA) 130 MMOL/L   137-145      L          

  

 

             ISTATK (test code = ISTATK) 5.1 MMOL/L   3.6-5.0      H            

 

             ISTATCL (test code = ISTATCL) 93 MMOL/L           L          

  

 

             ISTIONCA (test code = ISTIONCA) 1.09 MMOL/L  1.12-1.32    L        

    

 

             ISTCO2 (test code = ISTCO2) 10 MMOL/L    22-30        LL           

 

             ISTATGLU (test code = ISTATGLU) 584 MG/DL           HH       

    

 

             ISTATBUN (test code = ISTATBUN) 23.0 MG/DL   7.0-20.0     H        

    

 

             ISTCREA (test code = ISTCREA) 0.8 MG/DL    0.7-1.5                 

  

 

             ISTATHCT (test code = ISTATHCT) 52 %PCV      37.0-52.0             

    

 

             ISTATHGB (test code = ISTATHGB) 17.7 G/DL    12.0-18.0             

    

 

             ISTANGAP (test code = ISTANGAP) 33 MMOL/L                          

    



BLOOD GAS [id number] 10:17:00





             Test Item    Value        Reference Range Interpretation Comments

 

             SITE (test code = SITE) RTBRACH      SITE                      

 

             ALLENS (test code = ALLENS) POS                                    

 

             O2 EQUIP (test code = O2 EQUIP) N/A          O2-DEVICE             

    

 

             FIO2 (test code = FIO2) 21 %                                   

 

             PH (test code = BGPH) 7.21         7.35-7.45    LL           

 

             PCO2 (test code = PCO2) 14 MMHG      34.0-45.0    LL           

 

             PO2 (test code = PO2) 124 MMHG     88-96        H            

 

             HCO3 (test code = HCO3) 5.6 mmol/L   22.0-26.0    L            

 

             BE (test code = BE) -19.6 mmol/L -2.0-2.0     L            

 

             THB (test code = THB) 14.4 G/DL    13.5-18                   

 

             % 02 HB (test code = ABGSAT) 95.0 %       96.0-100.0   L           

 

 

             %COHB (test code = BGCO) 1.5 %        <1.5                      

 

             % MET HB (test code = %MET HB) 0.6 %        0.4-1.5                

   

 

             CAO2 (test code = CAO2) 19.4 VOL%    17.6-24.3                 

 

             PF/RATIO (test code = PF/RATIO) 590.0                              

    



RSLTS VERIFIED.GIVEN TO bv to dr. clarence mcclain 3/5/19 1017 by junior maharaj rrtEKG
2019 06:51:00HEART RATE: 80 bpmRR Interval: 750 msAtrial Rate: 81 msP-R 
Interval: 129 msP Duration: 94 msP Horizontal Axis: -32 degP Front Axis: 73 degQ
 Onset: 499 msQRSD Interval: 93 msQT Interval: 422 msQTcB: 487msQTcF: 464 msQRS 
Horizontal Axis: -73 degQRS Axis: 93 degI-40 Horizontal Axis: 10 degI-40 Front 
Axis: 70 degT-40 Horizontal Axis: 267 degT-40 Front Axis: 99 degT Horizontal 
Axis: 28 degT Wave Axis: 78degS-T Horizontal Axis: 74 degS-T Front Axis: 62 
degECG Severity: - BORDERLINE ECG -ECG Impression: Sinus rhythmECG Impression: 
Probable left atrial enlargementECG Impression: Prolonged QT intervalEKG
2019 10:26:00HEART RATE: 81 bpmRR Interval: 741 msAtrial Rate: 81 msP-R 
Interval: 121 msP Duration: 89 msP Horizontal Axis: -1 degP Front Axis: 77 degQ 
Onset: 499 msQRSD Interval: 88 msQT Interval: 402 msQTcB: 467 msQTcF: 444 msQRS 
Horizontal Axis: -56 degQRS Axis: 99 degI-40 Horizontal Axis: 10 degI-40 Front 
Axis: 93 degT-40 Horizontal Axis: -74 degT-40 Front Axis: 102 degT Horizontal 
Axis: 34 degT Wave Axis: 75degS-T Horizontal Axis: 81 degS-T Front Axis: 56 
degECG Severity: - NORMAL ECG -ECG Impression: Sinus rhythmWHOLE BLOOD GLUCOSE
2019-02-10 07:25:00





             Test Item    Value        Reference Range Interpretation Comments

 

             WHOLE BLOOD GLUCOSE (test code = POC 85 MG/DL     70-99            

         



             GLU)                                                



[id number] 07:07:00





             Test Item    Value        Reference Range Interpretation Comments

 

             WBC (test code = 7.5 K/UL     3.5-10.9                  



             WBC)                                                

 

             RBC (test code = 3.89 M/UL    4.3-5.7      L            



             RBC)                                                

 

             HGB (test code = 12.9 G/DL    13.0-17.9    L            



             HGB)                                                

 

             HCT (test code = 36.4 %       38-52        L            



             HCT)                                                

 

             MCV (test code = 93.6 FL      80-98                     



             MCV)                                                

 

             MCH (test code = 33.2 PG      28-32        H            



             MCH)                                                

 

             MCHC (test code = 35.4 G/DL    32.5-36.5                 



             MCHC)                                               

 

             RDW (test code = 13.0 %       11.5-14.5                 



             RDW)                                                

 

             PLT (test code = 189 K/UL     150-450                   



             PLT)                                                

 

             MPV (test code = 10.3 FL      7.4-10.4                  



             MPV)                                                

 

             MANDIFF (test code = NO                                     



             MANDIFF)                                            

 

             SCAN (test code = NO                                     



             SCAN)                                               

 

             NEUT% (test code = 68.4 %       40-75                     



             NEUT%)                                              

 

             LYMPH% (test code = 21.8 %       24-44        L            



             LYMPH%)                                             

 

             MONO% (test code = 8.0 %        0-13                      



             MONO%)                                              

 

             EOS% (test code = 0.3 %        0-4                       



             EOS%)                                               

 

             BASO % (test code = 0.3 %        0-2                       



             BASO%)                                              

 

             IG (test code = IG) 0 %          0-1                       

 

             IG% (test code = 1.2 %        0-1          H            IG% = Metam

yelocytes,



             IG%)                                                Myelocytes, and



                                                                 Promyelocytes.



                                                                 (Immature neutr

ophils



                                                                 not including "

bands".)



                                                                 **> 3% IG indic

ates



                                                                 risk of sepsis

 

             NRBC% (test code = 0 /100 WBC                             



             NRBC%)                                              

 

             ABS NEUT (test code 5.1 K/UL     1.2-7.2                   



             = NEUT)                                             



[id number] 07:05:00





             Test Item    Value        Reference Range Interpretation Comments

 

             MG (test code = MG) 1.7 mg/dL    1.6-2.3                   



[id number] 07:05:00





             Test Item    Value        Reference Range Interpretation Comments

 

             SODIUM (test code = 131 MMOL/L   137-145      L            



             NA)                                                 

 

             K+ (test code = 3.8 MMOL/L   3.5-5.1                   **** PLEASE 

NOTE NEW



             KSERUM)                                             REFERENCE RANGE

(S)



                                                                 IN EFFECT ****



                                                                 EFFECTIVE 

010 -



                                                                 NEW ANALYZER (V

ITROS



                                                                 5600)

 

             CHLORIDE (test code 100 MMOL/L                       



             = CL)                                               

 

             CO2 (test code = 27 MMOL/L    22-30                     



             CO2)                                                

 

             BUN (test code = 10 MG/DL     9-20                      



             BUN)                                                

 

             CREA (test code = 0.5 MG/DL    0.8-1.5      L            



             CREA)                                               

 

             GLUCOSE (test code 28 MG/DL     70-99        LL           **Fasting

 glucose



             = GLUCOSE)                                          normal <100 MG/

DL-



                                                                 [employer]

es



                                                                 Assoc



                                                                 recommendation*

*

 

             CALCIUM (test code 8.3 MG/DL    8.4-10.2     L            



             = CABLOOD)                                          

 

             TOTPROT (test code 5.2 G/DL     6.3-8.2      L            



             = TOTPROT)                                          

 

             ALBUMIN (test code 3.0 G/DL     3.5-5.0      L            



             = ALBSERUM)                                         

 

             BILITOT (test code 0.6 MG/DL    0.2-1.3                   



             = BILITOT)                                          

 

             AST (test code = 32 U/L       15-46                     



             AST)                                                

 

             PHOSALK (test code 118 U/L                          



             = PHOSALK)                                          

 

             ALT (test code = 55 U/L       13-69                     



             ALT)                                                

 

             GFR (test code = 188                                    A GFR of >9

0



             GFR)         mL/min/1.73m2                           mL/min/1.73m2 

is



                                                                 considered norm

al.



RESULTS VERIFIED.C'd TO  s/molly rubio/0704/[facility] [id number] 
23:04:00





             Test Item    Value        Reference Range Interpretation Comments

 

             AMPHET (test code = NEGATIVE     NEGATIVE                  This is 

an unconfirmed



             BAMP)                                               screening.  Res

ult are



                                                                 to be used for 

medical



                                                                 purposes (treat

ment)



                                                                 only.  Not inte

nded for



                                                                 non-medical pur

poses.



                                                                 Cut-off concent

ration



                                                                 for a positive 

result



                                                                 for each drug:



                                                                 Amphetamine - 1

,000



                                                                 ng/ml Barbitura

te - 200



                                                                 ng/ml Benzodiaz

epine -



                                                                 200 ng/ml Canna

binoids



                                                                 - 50 ng/ml Coca

ine -



                                                                 300 ng/ml Opiat

es - 300



                                                                 ng/ml PCP - 25 

ng/ml

 

             BARBITURATES (test NEGATIVE     NEGATIVE                  



             code = BBAR)                                        

 

             BENZO (test code = NEGATIVE     NEGATIVE                  



             BBENZ)                                              

 

             CANNABS (test code = POSITIVE     NEGATIVE     A            



             BCANN)                                              

 

             COCAINE (test code = NEGATIVE     NEGATIVE                  



             BCOC)                                               

 

             OPIATES (test code = POSITIVE     NEGATIVE     A            



             BOPI)                                               

 

             PCP (test code = NEGATIVE     NEGATIVE                  



             BMTPCP)                                             



WHOLE BLOOD [id number] 21:40:00





             Test Item    Value        Reference Range Interpretation Comments

 

             WHOLE BLOOD GLUCOSE (test code = 169 MG/DL    70-99        H       

     



             POC GLU)                                            



FREE [id number] 21:31:00





             Test Item    Value        Reference Range Interpretation Comments

 

             FREE T3 (test code = T3FREE) 1.70 pg/mL   2.77-5.27    L           

 



THYROID STIMULATION [id number] 18:49:00





             Test Item    Value        Reference Range Interpretation Comments

 

             TSH (test code = TSH) 0.63 UIU/ML  0.465-4.68                



FREE [id number] 18:49:00





             Test Item    Value        Reference Range Interpretation Comments

 

             FT4 (test code = FT4) 0.97 ng/dL   0.78-2.19                 



% HEMOGLOBIN A1C (GLYCATED)2019 18:47:00





             Test Item    Value        Reference Range Interpretation Comments

 

             HEMOGLOBIN A1C (test 11.2 %       0-6          H                 TH

ERAPEUTIC TARGET



             code = GLYCO-)                                        FOR THE TREAT

MENT OF



                                                                 DIABETES      M

FIDEL



                                                                 PATIENTS IS < 7

% HBA1C.



                                                                     [employer] DI

ABETES



                                                                 ASSOC. DIABETES

 CARE



                                                                 2002;25:S33-S49



WHOLE BLOOD [id number] 17:30:00





             Test Item    Value        Reference Range Interpretation Comments

 

             WHOLE BLOOD GLUCOSE (test code = 216 MG/DL    70-99        H       

     



             POC GLU)                                            



CHEST XR 2 [id number] 17:01:[address] [address] REPORTPatient Name: DAGOBERTO FERMIN RDate of Service: [id number]:  48    Sex: M  Order #: [id number]     Room:   
[facility]  4SDOB: 1970     X-Ray Number: [id number] Record Number: 
[id number]     Hospital Number: [id number] Physician: SCHUYLER MURILLO Physician: DAVID GRIMES.2019 4:43 PMHistory: Short of 
breath.Technique:  PA and lateral chest projections.Findings: PA and lateral 
views of the chest demonstrate normal heart sizeand clear lungs. No infiltrates 
or abnormalities are depicted. The osseousstructures appear intact.Impression:No
 acute-appearing cardiopulmonary abnormalities.Electronically Signed By: Rigo Ramirez M.D., 2019 4:59 PMLegally authenticated by DIEGO SLATER 
2019 16:59:11BMP, BASIC METABOLIC [id number] 16:32:00





             Test Item    Value        Reference Range Interpretation Comments

 

             SODIUM (test code = 133 MMOL/L   137-145      L            



             NA)                                                 

 

             K+ (test code = 4.9 MMOL/L   3.5-5.1                   **** PLEASE 

NOTE NEW



             KSERUM)                                             REFERENCE RANGE

(S)



                                                                 IN EFFECT ****



                                                                 EFFECTIVE 

010 -



                                                                 NEW ANALYZER (V

ITROS



                                                                 5600)

 

             CHLORIDE (test code 96 MMOL/L           L            



             = CL)                                               

 

             CO2 (test code = 27 MMOL/L    22-30                     



             CO2)                                                

 

             BUN (test code = 13 MG/DL     9-20                      



             BUN)                                                

 

             CREA (test code = 0.8 MG/DL    0.8-1.5                   



             CREA)                                               

 

             GLUCOSE (test code 315 MG/DL    70-99        H            **Fasting

 glucose



             = GLUCOSE)                                          normal <100 MG/

DL-



                                                                 [employer] Di[employer]et

es



                                                                 Assoc



                                                                 recommendation*

*

 

             CALCIUM (test code 8.7 MG/DL    8.4-10.2                  



             = CABLOOD)                                          

 

             GFR (test code = 109                                    A GFR of >9

0



             GFR)         mL/min/1.73m2                           mL/min/1.73m2 

is



                                                                 considered norm

al.



[id number] 16:32:00





             Test Item    Value        Reference Range Interpretation Comments

 

             MG (test code = MG) 2.1 mg/dL    1.6-2.3                   



[id number] 16:32:00





             Test Item    Value        Reference Range Interpretation Comments

 

             PHOSPHOR (test code = PHOSPHOR) 3.3 MG/DL    2.5-4.5               

    



WHOLE BLOOD [id number] 11:40:00





             Test Item    Value        Reference Range Interpretation Comments

 

             WHOLE BLOOD GLUCOSE (test code = 344 MG/DL    70-99        H       

     



             POC GLU)                                            



[id number] 11:05:00





             Test Item    Value        Reference Range Interpretation Comments

 

             MG (test code = MG) 2.4 mg/dL    1.6-2.3      H            



[id number] 11:05:00





             Test Item    Value        Reference Range Interpretation Comments

 

             PHOSPHOR (test code = PHOSPHOR) 3.2 MG/DL    2.5-4.5               

    



BMP, BASIC METABOLIC [id number] 11:04:00





             Test Item    Value        Reference Range Interpretation Comments

 

             SODIUM (test code = 137 MMOL/L   137-145                   



             NA)                                                 

 

             K+ (test code = 5.3 MMOL/L   3.5-5.1      H            **** PLEASE 

NOTE NEW



             KSERUM)                                             REFERENCE RANGE

(S)



                                                                 IN EFFECT ****



                                                                 EFFECTIVE 

010 -



                                                                 NEW ANALYZER (V

ITROS



                                                                 5600)

 

             CHLORIDE (test code 99 MMOL/L                        



             = CL)                                               

 

             CO2 (test code = 26 MMOL/L    22-30                     



             CO2)                                                

 

             BUN (test code = 12 MG/DL     9-20                      



             BUN)                                                

 

             CREA (test code = 0.6 MG/DL    0.8-1.5      L            



             CREA)                                               

 

             GLUCOSE (test code 261 MG/DL    70-99        H            **Fasting

 glucose



             = GLUCOSE)                                          normal <100 MG/

DL-



                                                                 [employer] Diabet

es



                                                                 Assoc



                                                                 recommendation*

*

 

             CALCIUM (test code 8.7 MG/DL    8.4-10.2                  



             = CABLOOD)                                          

 

             GFR (test code = 152                                    A GFR of >9

0



             GFR)         mL/min/1.73m2                           mL/min/1.73m2 

is



                                                                 considered norm

al.



% HEMOGLOBIN A1C (GLYCATED)2019 10:04:00





             Test Item    Value        Reference Range Interpretation Comments

 

             HEMOGLOBIN A1C (test 11.0 %       0-6          H                 TH

ERAPEUTIC TARGET



             code = GLYCO-)                                        FOR THE TREAT

MENT OF



                                                                 DIABETES      M

FIDEL



                                                                 PATIENTS IS < 7

% HBA1C.



                                                                     [employer]

ABETES



                                                                 ASSOC. DIABETES

 CARE



                                                                 2002;25:S33-S49



WHOLE BLOOD [id number] 08:40:00





             Test Item    Value        Reference Range Interpretation Comments

 

             WHOLE BLOOD GLUCOSE (test code = 143 MG/DL    70-99        H       

     



             POC GLU)                                            



WHOLE BLOOD [id number] 08:15:00





             Test Item    Value        Reference Range Interpretation Comments

 

             WHOLE BLOOD GLUCOSE (test code = 147 MG/DL    70-99                

     



             POC GLU)                                            



WHOLE BLOOD [id number] 07:45:00





             Test Item    Value        Reference Range Interpretation Comments

 

             WHOLE BLOOD GLUCOSE (test code = 150 MG/DL    70-99                

     



             POC GLU)                                            



WHOLE BLOOD [id number] 07:45:00





             Test Item    Value        Reference Range Interpretation Comments

 

             WHOLE BLOOD GLUCOSE (test code = 160 MG/DL    70-99        H       

     



             POC GLU)                                            



WHOLE BLOOD [id number] 07:45:00





             Test Item    Value        Reference Range Interpretation Comments

 

             WHOLE BLOOD GLUCOSE (test code = 117 MG/DL    70-99        H       

     



             POC GLU)                                            



BMP, BASIC METABOLIC [id number] 06:51:00





             Test Item    Value        Reference Range Interpretation Comments

 

             SODIUM (test code = 138 MMOL/L   137-145                   



             NA)                                                 

 

             K+ (test code = 3.9 MMOL/L   3.5-5.1                   **** PLEASE 

NOTE NEW



             KSERUM)                                             REFERENCE RANGE

(S)



                                                                 IN EFFECT ****



                                                                 EFFECTIVE 

010 -



                                                                 NEW ANALYZER (V

ITROS



                                                                 5600)

 

             CHLORIDE (test code 104 MMOL/L                       



             = CL)                                               

 

             CO2 (test code = 23 MMOL/L    22-30                     



             CO2)                                                

 

             BUN (test code = 12 MG/DL     9-20                      



             BUN)                                                

 

             CREA (test code = 0.6 MG/DL    0.8-1.5      L            



             CREA)                                               

 

             GLUCOSE (test code 116 MG/DL    70-99        H            **Fasting

 glucose



             = GLUCOSE)                                          normal <100 MG/

DL-



                                                                 [employer] Di[employer]et

es



                                                                 Assoc



                                                                 recommendation*

*

 

             CALCIUM (test code 8.6 MG/DL    8.4-10.2                  



             = CABLOOD)                                          

 

             GFR (test code = 152                                    A GFR of >9

0



             GFR)         mL/min/1.73m2                           mL/min/1.73m2 

is



                                                                 considered norm

al.



[id number] 06:51:00





             Test Item    Value        Reference Range Interpretation Comments

 

             MG (test code = MG) 1.8 mg/dL    1.6-2.3                   



[id number] 06:51:00





             Test Item    Value        Reference Range Interpretation Comments

 

             PHOSPHOR (test code = PHOSPHOR) 2.1 MG/DL    2.5-4.5      L        

    



WHOLE BLOOD [id number] 05:00:00





             Test Item    Value        Reference Range Interpretation Comments

 

             WHOLE BLOOD GLUCOSE (test code = 118 MG/DL    70-99        H       

     



             POC GLU)                                            



WHOLE BLOOD [id number] 03:50:00





             Test Item    Value        Reference Range Interpretation Comments

 

             WHOLE BLOOD GLUCOSE (test code = 214 MG/DL    70-99        H       

     



             POC GLU)                                            



[id number] 03:46:00





             Test Item    Value        Reference Range Interpretation Comments

 

             CALCIUM (test code = CABLOOD) 9.0 MG/DL    8.4-10.2                

  



WHOLE BLOOD [id number] 03:00:00





             Test Item    Value        Reference Range Interpretation Comments

 

             WHOLE BLOOD GLUCOSE (test code = 235 MG/DL    70-99        H       

     



             POC GLU)                                            



WHOLE BLOOD [id number] 01:55:00





             Test Item    Value        Reference Range Interpretation Comments

 

             WHOLE BLOOD GLUCOSE (test code = 254 MG/DL    70-99        H       

     



             POC GLU)                                            



BLOOD GAS PH [id number] 01:50:00





             Test Item    Value        Reference Range Interpretation Comments

 

             BGPHVEN (test code = BGPHVEN) 7.33                                 

  



BG LAB VENOUS [id number] 01:45:00





             Test Item    Value        Reference Range Interpretation Comments

 

             SITE (test code = SITE) IV           SITE                      

 

             BGLACVEN (test code = BGLACVEN) 13.0 mg/dL   6.0-18.0              

    



[id number] 01:32:00





             Test Item    Value        Reference Range Interpretation Comments

 

             MG (test code = MG) 1.8 mg/dL    1.6-2.3                   



[id number] 01:32:00





             Test Item    Value        Reference Range Interpretation Comments

 

             PHOSPHOR (test code = PHOSPHOR) 4.2 MG/DL    2.5-4.5               

    



WHOLE BLOOD [id number] 00:00:00





             Test Item    Value        Reference Range Interpretation Comments

 

             WHOLE BLOOD GLUCOSE (test code = 335 MG/DL    70-99        H       

     



             POC GLU)                                            



[id number] 23:34:00





             Test Item    Value        Reference Range Interpretation Comments

 

             BETA-HYDROXYBUTYRATE (test code 13.50 mmol/L 0.02-0.27    H        

    



             = BETAHYD)                                          



[id number] 23:13:00





             Test Item    Value        Reference Range Interpretation Comments

 

             GLUCOSE (test code = URGLU) >=1000 MG/DL NEG-100                   

 

             BILIRUBN (test code = URBILI) NEGATIVE     NEGATIVE                

  

 

             KETONE (test code = URKET) 80 MG/DL     NEGATIVE                  

 

             BLOOD (test code = URBLD) MODERATE                               

 

             UR PH (test code = URPH) 5.0          5.0-7.5                   

 

             PROTEIN (test code = URPRO) 100 MG/DL    NEGATIVE                  

 

             NITRITES (test code = URNIT) NEGATIVE     NEGATIVE                 

 

 

             UROBILINGEN (test code = URURO) 0.2 EU/DL    0.2-1.0               

    

 

             LEUKOCYT (test code = URLEU) NEGATIVE     NEGATIVE                 

 

 

             UA COLOR (test code = UA COLOR) YELLOW       YELLOW                

    

 

             CLARITY (test code = CLARITY) CLEAR        CLEAR                   

  

 

             SP GRAV (test code = URSPGRAV) 1.031        1.000-1.025  H         

   

 

             UAMICRO (test code = UAMICRO) YES                                  

  

 

             WBC (test code = URWBC) 0 /HPF       0-5                       

 

             RBC (test code = URRBC) 7 /HPF       0-2          H            

 

             CASTS (test code = CAST) 1 /LPF       0-3                       

 

             UR EPI (test code = EPI) 4 /LPF                                 

 

             BACTERIA (test code = BACTERIA) NEGATIVE     NONE                  

    



[id number] 23:10:00





             Test Item    Value        Reference Range Interpretation Comments

 

             LIPASE (test code = LIPA) 147 U/L                          



LIVER [id number] 23:10:00





             Test Item    Value        Reference Range Interpretation Comments

 

             TOTPROT (test code = TOTPROT) 6.2 G/DL     6.3-8.2      L          

  

 

             ALBUMIN (test code = ALBSERUM) 4.1 G/DL     3.5-5.0                

   

 

             BILITOT (test code = BILITOT) 0.9 MG/DL    0.2-1.3                 

  

 

             BILIDIR (test code = BILIDIR) 0.8 MG/DL    0.0-0.4      H          

  

 

             AST (test code = AST) 44 U/L       15-46                     

 

             PHOSALK (test code = PHOSALK) 171 U/L             H          

  

 

             ALT (test code = ALT) 67 U/L       13-69                     



[id number] 22:51:00





             Test Item    Value        Reference Range Interpretation Comments

 

             WBC (test code = 5.3 K/UL     3.5-10.9                  



             WBC)                                                

 

             RBC (test code = 4.63 M/UL    4.3-5.7                   



             RBC)                                                

 

             HGB (test code = 15.3 G/DL    13.0-17.9                 



             HGB)                                                

 

             HCT (test code = 44.9 %       38-52                     



             HCT)                                                

 

             MCV (test code = 97.0 FL      80-98                     



             MCV)                                                

 

             MCH (test code = 33.0 PG      28-32        H            



             MCH)                                                

 

             MCHC (test code = 34.1 G/DL    32.5-36.5                 



             MCHC)                                               

 

             RDW (test code = 13.1 %       11.5-14.5                 



             RDW)                                                

 

             PLT (test code = 242 K/UL     150-450                   



             PLT)                                                

 

             MPV (test code = 10.3 FL      7.4-10.4                  



             MPV)                                                

 

             MANDIFF (test code = NO                                     



             MANDIFF)                                            

 

             SCAN (test code = NO                                     



             SCAN)                                               

 

             NEUT% (test code = 78.0 %       40-75        H            



             NEUT%)                                              

 

             LYMPH% (test code = 15.6 %       24-44        L            



             LYMPH%)                                             

 

             MONO% (test code = 4.9 %        0-13                      



             MONO%)                                              

 

             EOS% (test code = 0.0 %        0-4                       



             EOS%)                                               

 

             BASO % (test code = 0.6 %        0-2                       



             BASO%)                                              

 

             IG (test code = IG) 0 %          0-1                       

 

             IG% (test code = 0.9 %        0-1                       IG% = Metam

yelocytes,



             IG%)                                                Myelocytes, and



                                                                 Promyelocytes.



                                                                 (Immature neutr

ophils



                                                                 not including "

bands".)



                                                                 **> 3% IG indic

ates



                                                                 risk of sepsis

 

             NRBC% (test code = 0 /100 WBC                             



             NRBC%)                                              

 

             ABS NEUT (test code 4.2 K/UL     1.2-7.2                   



             = NEUT)                                             



BG LAB VENOUS [id number] 22:42:00





             Test Item    Value        Reference Range Interpretation Comments

 

             SITE (test code = IV           SITE                      



             SITE)                                               

 

             BGLACVEN (test code = 36.0 mg/dL   6.0-18.0     HH           RSLTS 

VERIFIED.GIVEN



             BGLACVEN)                                           TO DR LEONARD/ER//BR



                                                                 [place] PERRICONE 

RRT



RSLTS VERIFIED.GIVEN TO DR LEONARD/ER//ROSITA PERRICONE RRTISTAT CHEM 8
2019 22:15:00





             Test Item    Value        Reference Range Interpretation Comments

 

             ISTATNA (test code = ISTATNA) 132 MMOL/L   137-145      L          

  

 

             ISTATK (test code = ISTATK) 4.3 MMOL/L   3.6-5.0                   

 

             ISTATCL (test code = ISTATCL) 96 MMOL/L           L          

  

 

             ISTIONCA (test code = ISTIONCA) 1.05 MMOL/L  1.12-1.32    L        

    

 

             ISTCO2 (test code = ISTCO2) 14 MMOL/L    22-30        L            

 

             ISTATGLU (test code = ISTATGLU) 429 MG/DL           H        

    

 

             ISTATBUN (test code = ISTATBUN) 13.0 MG/DL   7.0-20.0              

    

 

             ISTCREA (test code = ISTCREA) 0.7 MG/DL    0.7-1.5                 

  

 

             ISTATHCT (test code = ISTATHCT) 45 %PCV      37.0-52.0             

    

 

             ISTATHGB (test code = ISTATHGB) 15.3 G/DL    12.0-18.0             

    

 

             ISTANGAP (test code = ISTANGAP) 27 MMOL/L                          

    



WHOLE BLOOD [id number] 19:05:00





             Test Item    Value        Reference Range Interpretation Comments

 

             WHOLE BLOOD GLUCOSE (test code = 176 MG/DL    70-99        H       

     



             POC GLU)                                            



WHOLE BLOOD [id number] 18:15:00





             Test Item    Value        Reference Range Interpretation Comments

 

             WHOLE BLOOD GLUCOSE (test code = 184 MG/DL    70-99        H       

     



             POC GLU)                                            



WHOLE BLOOD [id number] 16:30:00





             Test Item    Value        Reference Range Interpretation Comments

 

             WHOLE BLOOD GLUCOSE (test code = POC 25 MG/DL     70-99        LL  

         



             GLU)                                                



[id number] 15:18:00





             Test Item    Value        Reference Range Interpretation Comments

 

             LIPASE (test code = LIPA) 44 U/L                           



US LIMITED ABD [id number] 15:13:00[address] [id number] IMAGING REPORTPatient Name:
 DAGOBERTO FERMIN RDate of Service: 16-23-1331Efq:  48    Sex: M  Order #: [id number]     
[facility] 1970     [facility]Ray Number: [id number] Record Number: 
[id number]     Hospital Number: [id number] Physician: CULLEN KEY 
Physician: ROHAN FERREIRA TANULTRASOUND ABDOMEN LIMITED RIGHT UPPER QUADRANT  245 
p.m.HISTORY: Abdominal pain, pancreatitisFINDINGS:The liver is unremarkable wit
hout mass lesion.The visualized portions of the portal vein, aorta and IVC are 
patent.There are no gallstones, pericholecystic fluid or gallbladder 
wallthickening.The common bile duct is not dilated.Evaluation of the pancreas is
 somewhat limited due to bowel gas. There arecalcifications within the pancreas 
consistent with chronic pancreatitis.There is no ascites in the right upper 
quadrant.The right kidney is sonographically unremarkable.IMPRESSION:Pancreatic 
calcifications consistent with chronic pancreatitis. Otherwiseunremarkable 
ultrasound right upper quadrant.Electronically Signed By: Mauro Martin M.D., 
2019 3:11 PMLegally authenticated by YEIMY ROSALES 2019 15:11:18VENOUS
 BLOOD [id number] 14:50:00





             Test Item    Value        Reference Range Interpretation Comments

 

             SITE (test code = SITE) VENOUS       SITE                      

 

             ALLENS (test code = ALLENS) NA                                     

 

             O2 EQUIP (test code = O2 EQUIP) NA           O2-DEVICE             

    

 

             FIO2 (test code = FIO2) 21% %                                  

 

             PH (test code = MVPH) 7.42         7.32-7.42                 

 

             PCO2 (test code = MVPCO2) 42 MMHG      41.0-51.0                 

 

             PO2 (test code = MVPO2) 53 MMHG                                

 

             MVHCO3 (test code = MVHCO3) 27.2         24.0-28.0                 

 

             BE (test code = MVBE) 2.4          -2.0-+2.0    H            

 

             THB (test code = MVTHB) 14.1 G/DL    13.5-18                   

 

             %O2 HB (test code = MV%O2 HB) 88.5 %       40.0-70.0    H          

  

 

             %COHB (test code = MV%COHB) 3.3 %                                  

 

             %MET HB (test code = MV%METHB) 0.6 %        0.4-1.5                

   



BG LAB VENOUS [id number] 14:50:00





             Test Item    Value        Reference Range Interpretation Comments

 

             SITE (test code = SITE) VENOUS       SITE                      

 

             BGLACVEN (test code = BGLACVEN) 8.0 mg/dL    6.0-18.0              

    



[id number] 13:29:00





             Test Item    Value        Reference Range Interpretation Comments

 

             SODIUM (test code = 136 MMOL/L   137-145      L            



             NA)                                                 

 

             K+ (test code = 3.7 MMOL/L   3.5-5.1                   **** PLEASE 

NOTE NEW



             KSERUM)                                             REFERENCE RANGE

(S)



                                                                 IN EFFECT ****



                                                                 EFFECTIVE 

010 -



                                                                 NEW ANALYZER (V

[employer]



                                                                 5600)

 

             CHLORIDE (test code 102 MMOL/L                       



             = CL)                                               

 

             CO2 (test code = 28 MMOL/L    22-30                     



             CO2)                                                

 

             BUN (test code = 10 MG/DL     9-20                      



             BUN)                                                

 

             CREA (test code = 0.4 MG/DL    0.8-1.5      L            



             CREA)                                               

 

             GLUCOSE (test code 252 MG/DL    70-99        H            **Fasting

 glucose



             = GLUCOSE)                                          normal <100 MG/

DL-



                                                                 [employer]

es



                                                                 Assoc



                                                                 recommendation*

*

 

             CALCIUM (test code 8.6 MG/DL    8.4-10.2                  



             = CABLOOD)                                          

 

             TOTPROT (test code 5.5 G/DL     6.3-8.2      L            



             = TOTPROT)                                          

 

             ALBUMIN (test code 3.3 G/DL     3.5-5.0      L            



             = ALBSERUM)                                         

 

             BILITOT (test code 0.4 MG/DL    0.2-1.3                   



             = BILITOT)                                          

 

             AST (test code = 311 U/L      15-46        H            



             AST)                                                

 

             PHOSALK (test code 134 U/L             H            



             = PHOSALK)                                          

 

             ALT (test code = 113 U/L      13-69        H            



             ALT)                                                

 

             GFR (test code = 243                                    A GFR of >9

0



             GFR)         mL/min/1.73m2                           mL/min/1.73m2 

is



                                                                 considered norm

al.



[id number] 13:29:00





             Test Item    Value        Reference Range Interpretation Comments

 

             MG (test code = MG) 1.6 mg/dL    1.6-2.3                   



[id number] 12:59:00





             Test Item    Value        Reference Range Interpretation Comments

 

             WBC (test code = 13.6 K/UL    3.5-10.9     H            



             WBC)                                                

 

             RBC (test code = 4.15 M/UL    4.3-5.7      L            



             RBC)                                                

 

             HGB (test code = 13.8 G/DL    13.0-17.9                 



             HGB)                                                

 

             HCT (test code = 39.4 %       38-52                     



             HCT)                                                

 

             MCV (test code = 94.9 FL      80-98                     



             MCV)                                                

 

             MCH (test code = 33.3 PG      28-32        H            



             MCH)                                                

 

             MCHC (test code = 35.0 G/DL    32.5-36.5                 



             MCHC)                                               

 

             RDW (test code = 12.9 %       11.5-14.5                 



             RDW)                                                

 

             PLT (test code = 355 K/UL     150-450                   



             PLT)                                                

 

             MPV (test code = 9.3 FL       7.4-10.4                  



             MPV)                                                

 

             MANDIFF (test code = NO                                     



             MANDIFF)                                            

 

             SCAN (test code = NO                                     



             SCAN)                                               

 

             NEUT% (test code = 80.3 %       40-75        H            



             NEUT%)                                              

 

             LYMPH% (test code = 10.5 %       24-44        L            



             LYMPH%)                                             

 

             MONO% (test code = 7.4 %        0-13                      



             MONO%)                                              

 

             EOS% (test code = 0.3 %        0-4                       



             EOS%)                                               

 

             BASO % (test code = 0.2 %        0-2                       



             BASO%)                                              

 

             IG (test code = IG) 0 %          0-1                       

 

             IG% (test code = 1.3 %        0-1          H            IG% = Metam

yelocytes,



             IG%)                                                Myelocytes, and



                                                                 Promyelocytes.



                                                                 (Immature neutr

ophils



                                                                 not including "

bands".)



                                                                 **> 3% IG indic

ates



                                                                 risk of sepsis

 

             NRBC% (test code = 0 /100 WBC                             



             NRBC%)                                              

 

             ABS NEUT (test code 11.0 K/UL    1.2-7.2      H            



             = NEUT)                                             



WHOLE BLOOD [id number] 11:40:00





             Test Item    Value        Reference Range Interpretation Comments

 

             WHOLE BLOOD GLUCOSE (test code = 208 MG/DL    70-99        H       

     



             POC GLU)                                            



WHOLE BLOOD [id number] 06:40:00





             Test Item    Value        Reference Range Interpretation Comments

 

             WHOLE BLOOD GLUCOSE (test code = 236 MG/DL    70-99        H       

     



             POC GLU)                                            



WHOLE BLOOD [id number] 23:30:00





             Test Item    Value        Reference Range Interpretation Comments

 

             WHOLE BLOOD GLUCOSE (test code = 227 MG/DL    70-99        H       

     



             POC GLU)                                            



WHOLE BLOOD [id number] 20:10:00





             Test Item    Value        Reference Range Interpretation Comments

 

             WHOLE BLOOD GLUCOSE (test code = 254 MG/DL    70-99        H       

     



             POC GLU)                                            



WHOLE BLOOD [id number] 18:00:00





             Test Item    Value        Reference Range Interpretation Comments

 

             WHOLE BLOOD GLUCOSE (test code = 200 MG/DL    70-99        H       

     



             POC GLU)                                            



WHOLE BLOOD [id number] 13:05:00





             Test Item    Value        Reference Range Interpretation Comments

 

             WHOLE BLOOD GLUCOSE (test code = 189 MG/DL    70-99                

     



             POC GLU)                                            



WHOLE BLOOD [id number] 07:50:00





             Test Item    Value        Reference Range Interpretation Comments

 

             WHOLE BLOOD GLUCOSE (test code = 349 MG/DL    70-99        H       

     



             POC GLU)                                            



BMP, BASIC METABOLIC [id number] 05:01:00





             Test Item    Value        Reference Range Interpretation Comments

 

             SODIUM (test code = 130 MMOL/L   137-145      L            



             NA)                                                 

 

             K+ (test code = 5.1 MMOL/L   3.5-5.1                   **** PLEASE 

NOTE NEW



             KSERUM)                                             REFERENCE RANGE

(S)



                                                                 IN EFFECT ****



                                                                 EFFECTIVE 

010 -



                                                                 NEW ANALYZER (V

ITROS



                                                                 5600)

 

             CHLORIDE (test code 94 MMOL/L           L            



             = CL)                                               

 

             CO2 (test code = 22 MMOL/L    22-30                     



             CO2)                                                

 

             BUN (test code = 18 MG/DL     9-20                      



             BUN)                                                

 

             CREA (test code = 0.7 MG/DL    0.8-1.5      L            



             CREA)                                               

 

             GLUCOSE (test code 335 MG/DL    70-99        H            **Fasting

 glucose



             = GLUCOSE)                                          normal <100 MG/

DL-



                                                                 [employer]

es



                                                                 Assoc



                                                                 recommendation*

*

 

             CALCIUM (test code 8.1 MG/DL    8.4-10.2     L            



             = CABLOOD)                                          

 

             GFR (test code = 127                                    A GFR of >9

0



             GFR)         mL/min/1.73m2                           mL/min/1.73m2 

is



                                                                 considered norm

al.



[id number] 04:53:00





             Test Item    Value        Reference Range Interpretation Comments

 

             WBC (test code = 16.3 K/UL    3.5-10.9     H            



             WBC)                                                

 

             RBC (test code = 4.11 M/UL    4.3-5.7      L            



             RBC)                                                

 

             HGB (test code = 13.7 G/DL    13.0-17.9                 



             HGB)                                                

 

             HCT (test code = 37.7 %       38-52        L            



             HCT)                                                

 

             MCV (test code = 91.7 FL      80-98                     



             MCV)                                                

 

             MCH (test code = 33.3 PG      28-32        H            



             MCH)                                                

 

             MCHC (test code = 36.3 G/DL    32.5-36.5                 



             MCHC)                                               

 

             RDW (test code = 12.3 %       11.5-14.5                 



             RDW)                                                

 

             PLT (test code = 338 K/UL     150-450                   



             PLT)                                                

 

             MPV (test code = 9.4 FL       7.4-10.4                  



             MPV)                                                

 

             MANDIFF (test code = NO                                     



             MANDIFF)                                            

 

             SCAN (test code = NO                                     



             SCAN)                                               

 

             NEUT% (test code = 79.4 %       40-75        H            



             NEUT%)                                              

 

             LYMPH% (test code = 10.1 %       24-44        L            



             LYMPH%)                                             

 

             MONO% (test code = 9.6 %        0-13                      



             MONO%)                                              

 

             EOS% (test code = 0.0 %        0-4                       



             EOS%)                                               

 

             BASO % (test code = 0.2 %        0-2                       



             BASO%)                                              

 

             IG (test code = IG) 0 %          0-1                       

 

             IG% (test code = 0.7 %        0-1                       IG% = Metam

yelocytes,



             IG%)                                                Myelocytes, and



                                                                 Promyelocytes.



                                                                 (Immature neutr

ophils



                                                                 not including "

bands".)



                                                                 **> 3% IG indic

ates



                                                                 risk of sepsis

 

             NRBC% (test code = 0 /100 WBC                             



             NRBC%)                                              

 

             ABS NEUT (test code 13.0 K/UL    1.2-7.2      H            



             = NEUT)                                             



WHOLE BLOOD [id number] 04:50:00





             Test Item    Value        Reference Range Interpretation Comments

 

             WHOLE BLOOD GLUCOSE (test code = 412 MG/DL    70-99                

     



             POC GLU)                                            



WHOLE BLOOD [id number] 02:25:00





             Test Item    Value        Reference Range Interpretation Comments

 

             WHOLE BLOOD GLUCOSE (test code = 371 MG/DL    70-99        H       

     



             POC GLU)                                            



WHOLE BLOOD [id number] 02:25:00





             Test Item    Value        Reference Range Interpretation Comments

 

             WHOLE BLOOD GLUCOSE (test code = 460 MG/DL    70-99        HH      

     



             POC GLU)                                            



[id number] 01:29:00





             Test Item    Value        Reference Range Interpretation Comments

 

             BETA-HYDROXYBUTYRATE (test code = 9.72 mmol/L  0.02-0.27    H      

      



             BETAHYD)                                            



BMP, BASIC METABOLIC [id number] 00:49:00





             Test Item    Value        Reference Range Interpretation Comments

 

             SODIUM (test code = 130 MMOL/L   137-145      L            



             NA)                                                 

 

             K+ (test code = 5.0 MMOL/L   3.5-5.1                   **** PLEASE 

NOTE NEW



             KSERUM)                                             REFERENCE RANGE

(S)



                                                                 IN EFFECT ****



                                                                 EFFECTIVE 

010 -



                                                                 NEW ANALYZER (V

ITROS



                                                                 5600)

 

             CHLORIDE (test code 85 MMOL/L           L            



             = CL)                                               

 

             CO2 (test code = 18 MMOL/L    22-30        L            



             CO2)                                                

 

             BUN (test code = 21 MG/DL     9-20         H            



             BUN)                                                

 

             CREA (test code = 0.9 MG/DL    0.8-1.5                   



             CREA)                                               

 

             GLUCOSE (test code 642 MG/DL    70-99        HH           **Fasting

 glucose



             = GLUCOSE)                                          normal <100 MG/

DL-



                                                                 [employer]

es



                                                                 Assoc



                                                                 recommendation*

*

 

             CALCIUM (test code 9.0 MG/DL    8.4-10.2                  



             = CABLOOD)                                          

 

             GFR (test code = 95                                     A GFR of >9

0



             GFR)         mL/min/1.73m2                           mL/min/1.73m2 

is



                                                                 considered norm

al.



RESULTS VERIFIED.C'd TO  [facility]8/er/Lanette [id number] 00:49:00





             Test Item    Value        Reference Range Interpretation Comments

 

             TOTPROT (test code = TOTPROT) 6.6 G/DL     6.3-8.2                 

  

 

             ALBUMIN (test code = ALBSERUM) 4.5 G/DL     3.5-5.0                

   

 

             BILITOT (test code = BILITOT) 0.9 MG/DL    0.2-1.3                 

  

 

             BILIDIR (test code = BILIDIR) 0.5 MG/DL    0.0-0.4      H          

  

 

             AST (test code = AST) 22 U/L       15-46                     

 

             PHOSALK (test code = PHOSALK) 132 U/L             H          

  

 

             ALT (test code = ALT) 39 U/L       13-69                     



[id number] 00:49:00





             Test Item    Value        Reference Range Interpretation Comments

 

             LIPASE (test code = LIPA) 103 U/L                          



[id number] 00:41:00





             Test Item    Value        Reference Range Interpretation Comments

 

             GLUCOSE (test code = URGLU) >=1000 MG/DL NEG-100                   

 

             BILIRUBN (test code = URBILI) NEGATIVE     NEGATIVE                

  

 

             KETONE (test code = URKET) 80 MG/DL     NEGATIVE                  

 

             BLOOD (test code = URBLD) MODERATE                               

 

             UR PH (test code = URPH) 5.0          5.0-7.5                   

 

             PROTEIN (test code = URPRO) 100 MG/DL    NEGATIVE                  

 

             NITRITES (test code = URNIT) NEGATIVE     NEGATIVE                 

 

 

             UROBILINGEN (test code = URURO) 0.2 EU/DL    0.2-1.0               

    

 

             LEUKOCYT (test code = URLEU) NEGATIVE     NEGATIVE                 

 

 

             UA COLOR (test code = UA COLOR) YELLOW       YELLOW                

    

 

             CLARITY (test code = CLARITY) CLEAR        CLEAR                   

  

 

             SP GRAV (test code = URSPGRAV) 1.033        1.000-1.025  H         

   

 

             UAMICRO (test code = UAMICRO) YES                                  

  

 

             WBC (test code = URWBC) 1 /HPF       0-5                       

 

             RBC (test code = URRBC) 7 /HPF       0-2          H            

 

             CASTS (test code = CAST) 3 /LPF       0-3                       

 

             UR EPI (test code = EPI) 8 /LPF                                 

 

             BACTERIA (test code = BACTERIA) NEGATIVE     NONE                  

    



[id number] 00:29:00





             Test Item    Value        Reference Range Interpretation Comments

 

             WBC (test code = 19.3 K/UL    3.5-10.9     H            



             WBC)                                                

 

             RBC (test code = 4.70 M/UL    4.3-5.7                   



             RBC)                                                

 

             HGB (test code = 15.7 G/DL    13.0-17.9                 



             HGB)                                                

 

             HCT (test code = 43.3 %       38-52                     



             HCT)                                                

 

             MCV (test code = 92.1 FL      80-98                     



             MCV)                                                

 

             MCH (test code = 33.4 PG      28-32        H            



             MCH)                                                

 

             MCHC (test code = 36.3 G/DL    32.5-36.5                 



             MCHC)                                               

 

             RDW (test code = 12.0 %       11.5-14.5                 



             RDW)                                                

 

             PLT (test code = 449 K/UL     150-450                   



             PLT)                                                

 

             MPV (test code = 9.8 FL       7.4-10.4                  



             MPV)                                                

 

             MANDIFF (test code = NO                                     



             MANDIFF)                                            

 

             SCAN (test code = NO                                     



             SCAN)                                               

 

             NEUT% (test code = 87.9 %       40-75        H            



             NEUT%)                                              

 

             LYMPH% (test code = 6.0 %        24-44        L            



             LYMPH%)                                             

 

             MONO% (test code = 4.9 %        0-13                      



             MONO%)                                              

 

             EOS% (test code = 0.1 %        0-4                       



             EOS%)                                               

 

             BASO % (test code = 0.3 %        0-2                       



             BASO%)                                              

 

             IG (test code = IG) 0 %          0-1                       

 

             IG% (test code = 0.8 %        0-1                       IG% = Metam

yelocytes,



             IG%)                                                Myelocytes, and



                                                                 Promyelocytes.



                                                                 (Immature neutr

ophils



                                                                 not including "

bands".)



                                                                 **> 3% IG indic

ates



                                                                 risk of sepsis

 

             NRBC% (test code = 0 /100 WBC                             



             NRBC%)                                              

 

             ABS NEUT (test code 17.0 K/UL    1.2-7.2      H            



             = NEUT)                                             



VENOUS BLOOD [id number] 00:11:00





             Test Item    Value        Reference Range Interpretation Comments

 

             SITE (test code = SITE) IV           SITE                      

 

             ALLENS (test code = ALLENS) NA                                     

 

             O2 EQUIP (test code = O2 EQUIP) ROOM AIR     O2-DEVICE             

    

 

             FIO2 (test code = FIO2) 21 %                                   

 

             PH (test code = MVPH) 7.29         7.32-7.42    L            

 

             PCO2 (test code = MVPCO2) 42 MMHG      41.0-51.0                 

 

             PO2 (test code = MVPO2) 37 MMHG                                

 

             MVHCO3 (test code = MVHCO3) 20.2         24.0-28.0    L            

 

             BE (test code = MVBE) -6.2         -2.0-+2.0    L            

 

             THB (test code = MVTHB) 15.0 G/DL    13.5-18                   

 

             %O2 HB (test code = MV%O2 HB) 69.8 %       40.0-70.0               

  

 

             %COHB (test code = MV%COHB) 3.6 %                                  

 

             %MET HB (test code = MV%METHB) 0.6 %        0.4-1.5                

   



HIP JOINT 2 [id number] 20:46:[address] [id number] IMAGING REPORTPatient Name: DAGOBERTO FERMIN RDate of Service: [id number]:  48    Sex: M  Order #: [id number]     Room:   
[facility]: 1970     X-Ray Number: [id number] Record Number: [id number]  
   Hospital Number: [id number] Physician: TANIA MCCRACKEN -Ordering 
Physician: Kassy SOLORIO hip 2 views:CLINICAL HISTORY:  Right hip pain ; 
no history of injuryTECHNIQUE:  AP pelvis and lateral view of the right hip are 
submittedFINDINGS:  There is no fracture or dislocation demonstrated.Mild 
degenerative changes are noted.The soft tissues are unremarkable.Impression: No 
acute changes are demonstrated.Electronically Signed By: Steven Lyman M.D., 2018 8:44 PMLegally authenticated by KEISHA BELL 2018 
20:44:11

## 2020-08-28 NOTE — XMS REPORT
Continuity of Care Document

                           Created on:2020



Patient:DAGOBERTO SANDOVAL

Sex:Male

:1970

External Reference #:[id number]





Demographics







                          Address                   [address] [place] [address]

 

                          Home Phone                [phone]

 

                          Email Address              N

 

                          Preferred Language        English

 

                          Marital Status            Never 

 

                          Yarsanism Affiliation     Unknown

 

                          Race                      White

 

                          Ethnic Group              Not  or 









Author







                          Organization              [facility]









Support







                Name            Relationship    Address         Phone

 

                MD MUSHTAQ LAGUERRE    Unavailable     [address] [facility]  [phone]



                                                [place] [address] 









Care Team Providers







                    Name                Role                Phone

 

                    PCP                 Primary Care Physician Unavailable









Allergies, Adverse Reactions, Alerts







          Allergen  Type      Severity  Reaction  Last Updated Verified  Status



                                                                      



                                                                      

 

          NO KNOWN  Allergy   Unknown             November  Yes       Active



          ALLERGY                                 2019           



                                                                      



                                                                      







Medications







      Medication Status Dose  Units Route Sig   Qty   Days  Start End   Instruct

ions



                                                      Date  Date  



                                                                  



                                                                  



                                                                  

 

      Acetaminophen/Co Active 1     TAB   PO    Every 6 15          August      

 



      deine Phosphate                         Hours as             ,       



      (Tylenol #4) 1                         needed                     



      Each TAB                         for               3:51am       



                                    Moderate                         



                                    Pain(4-6                         



                                    )                             



                                                                  



                                                                  

 

      Acetaminophen/Hy Active 1     TAB   PO    Every        



      drocodone Bitart                         Hours             2020      

 



      (Norco 5/325) 1                                           9:57am       



      Tab TAB                                                       



                                                                  

 

      Amlodipine Active 10    MG    PO    Every                         



      Besylate                         Morning                         



      (Norvasc) 10 Mg                                                       



      TAB                                                         



                                                                  

 

      Aspirin (Aspirin Active 81    MG    PO    Daily        



      Chewable) 81 Mg                                           2020       



      CHEW                                            9:56am       



                                                                  



                                                                  

 

      Clopidogrel Active 75    MG    PO    Daily        



      Bisulfate                                           2020       



      (Plavix) 75 Mg                                           9:56am       



      TAB                                                         



                                                                  

 

      Dicyclomine Hcl Active 10    MG    PO    Three        



      (Bentyl) 10 Mg                         Times A             ,       



      CAP                           Day as                     



                                    needed             2:21am       



                                    for                           



                                    Moderate                         



                                    Pain(4-6                         



                                    )                             



                                                                  



                                                                  

 

      Fluoxetine Hcl Active 40    MG    PO    Twice A        



      (Prozac) 20 Mg                         Day                      



      CAP                                                     



                                                      8:56am       



                                                                  



                                                                  

 

      Gabapentin Active 600   MG    PO    Twice A                         



      (Neurontin) 600                         Day                           



      Mg TAB                                                       



                                                                  

 

      Hydroxychloroqui Active 200   MG    PO    Twice A                         



      ne Sulfate                         Day                           



      (Plaquenil) 200                                                       



      Mg TAB                                                       



                                                                  

 

      Insulin Glargine Active 20    UNIT  SUBCUT Every        



      (Lantus U-100)                         Morning             ,       



      100 Unit/1 Ml                                                   



      SOLN                                            7:34am       



                                                                  



                                                                  

 

      Insulin Human Active 5     UNIT  SUBCUT Three        



      Lispro (Humalog                         Times             2019       



      Inj (3ML Vial))                         Daily             11:14am       



      100 Unit/1 Ml                         With                          



      INJ                           Meals                         



                                                                  



                                                                  

 

      Meloxicam Active 15    MG    PO    Daily                         



      (Mobic) 15 Mg                                                       



      TAB                                                         



                                                                  

 

      Metoprolol Active 25    MG    PO    Daily        



      Succinate                                           2020       



      (Toprol Xl) 25                                           9:56am       



      Mg TABSR                                                       



                                                                  

 

      Omeprazole Active 20    MG    PO    Every 15          August       



      (Prilosec) 20 Mg                         Morning                    



      CPDR                                                    



                                                      2:21am       



                                                                  



                                                                  

 

      Ondansetron Hcl Active 4     MG    PO    Every 8        



      (Zofran) 4 Mg                         Hours             20th,       



      TAB                                             2013        



                                                      7:17pm       



                                                                  



                                                                  

 

      Ondansetron Hcl Active 4     MG    PO    Every 6 15          August       



      (Zofran) 4 Mg                         - 8               ,       



      TAB                           Hours as                     



                                    needed             2:21am       



                                    for                           



                                    Nausea /                         



                                    Vomiting                         



                                                                  



                                                                  

 

      Pantoprazole Active 40    MG    PO    Twice A        



      (Protonix) 40 Mg                         Day                      



      TABEC                                                   



                                                      7:34am       



                                                                  



                                                                  

 

      Saccharomyces Active 250   MG    PO    Twice A        



      Boulardii                         Day               2020       



      (Florastor) 250                                           9:56am       



      Mg CAPSULE                                                       



                                                                  

 

      Simvastatin Active 40    MG    PO    Every                         



      (Zocor) 40 Mg                         Evening                         



      TAB                                                         



                                                                  

 

      Trazodone Hcl Active 50    MG    PO    Daily                         



      (Desyrel) 50 Mg                                                       



      TAB                                                         



                                                                  

 

      Vancomycin Hcl Active 125   MG    PO    Every 6                         



      (Vancocin) 125                         Hours                         



      Mg CAPSULE                                                       



                                                                  







Problems

Active Problems







                    Medical Problem     Onset Date          Status



                                                            

 

                    DKA, type 1                             Active

 

                    Pyelonephritis                          Active

 

                    DKA (diabetic ketoacidoses)                     Resolved



                                                            

 

                    Diabetes mellitus                       Active

 

                    Hypomagnesemia                          Active

 

                    Hypophosphatemia                        Active

 

                    Dehydration                             Active

 

                    Hepatitis C                             Active

 

                    Tobacco abuse                           Active

 

                    N&V (nausea and vomiting)                     Resolved



                                                            

 

                    Leukocytosis                            Active

 

                    Chronic pancreatitis                     Active



                                                            

 

                    History of ETOH abuse                     Active



                                                            

 

                    Lip lesion                              Active

 

                    Diabetes mellitus                       Active

 

                    Non-compliance                          Active

 

                    Hepatitis C                             Active

 

                    Peripheral neuropathy                     Active



                                                            

 

                    MDD (major depressive disorder)                     Active



                                                            

 

                    Tobacco abuse                           Active

 

                    Marijuana abuse                         Active

 

                    Cellulitis                              Active

 

                    Lip ulcer                               Active

 

                    Gastroparesis                           Active

 

                    Essential hypertension                     Active



                                                            

 

                    Abdominal pain                          Active

 

                    Abscess, neck                           Active

 

                    Septic shock                            Active

 

                    Neck abscess                            Active

 

                    Acute renal failure (ARF)                     Active



                                                            

 

                    Bacteremia                              Active

 

                    Demand ischemia of myocardium                     Resolved



                                                            

 

                    C. difficile colitis                     Active



                                                            

 

                    Sacral decubitus ulcer                     Active



                                                            

 

                    Severe anemia                           Active



Inactive/Resolved Problems







                    Medical Problem     Onset Date          Status



                                                            

 

                    Hyperglycemia                           Resolved

 

                    Oral lesion                             Resolved

 

                    TRACI (acute kidney injury)                     Resolved



                                                            

 

                    Intravenous drug abuse                     Resolved



                                                            

 

                    Hypocarbia                              Resolved

 

                    Hyponatremia                            Resolved

 

                    Renal insufficiency                     Resolved

 

                    Bandemia                                Resolved

 

                    Medical non-compliance                     Resolved



                                                            

 

                    Hypocalcemia                            Resolved

 

                    Acute blood loss anemia                     Resolved



                                                            

 

                    GI bleed                                Resolved

 

                    Thrombocytopenia                        Resolved

 

                    NSTEMI (non-ST elevated myocardial                     Resol

garrett



                    infarction)                             

 

                    TRACI (acute kidney injury)                     Resolved



                                                            

 

                    Hypotension                             Resolved

 

                    Generalized weakness                     Resolved



                                                            

 

                    Pancreatic pseudocyst                     Resolved



                                                            

 

                    Esophagitis                             Resolved

 

                    Uncontrolled hypertension                     Resolved



                                                            

 

                    Elevated ALT measurement                     Resolved



                                                            

 

                    Diabetes mellitus, insulin dependent                     Res

olved



                    (IDDM), uncontrolled                     



                                                            

 

                    Elevated AST (SGOT)                     Resolved

 

                    Medical non-compliance                     Resolved



                                                            

 

                    Elevated alkaline phosphatase level                     Reso

lved



                                                            

 

                    Pancreatic atrophy                      Resolved

 

                    Degenerative joint disease (DJD) of                     Reso

lved



                    lumbar spine                            

 

                    Aortic atherosclerosis                     Resolved



                                                            







Procedures







                    Procedure           Date Performed      Status



                                                            

 

                    Computed tomography of abdomen 2020   completed



                    and pelvis with contrast                     



                                                            







Relevant Diagnostic Tests and/or Laboratory Data

Laboratory Results







          Test      Date/Time Result    Interpretation Reference Result    Perfo

rming



                                                  Range     Comment   Site



                                                                      



                                                                      

 

          White Blood August    4.9                 4.5-11.5            [facility], [address]



          Count     2020 10*3/uL                                 [place] 

[place] [id number]



                    9:55pm                                            



                                                                      



                                                                      

 

          Red Blood Count August    3.92                4.4-6.2             [facility], [address]



                    2020 10*6/uL                                 [place] 

[place] [id number]



                    9:55pm                                            



                                                                      



                                                                      

 

          Hemoglobin August    12.0 g/dL           13.0-17.5           [facility], [address]



                    2020                                         [place] 

[place] [id number]



                    9:55pm                                            



                                                                      



                                                                      

 

          Hematocrit August    35.0 %              39.0-52.5           [facility], [address]



                    2020                                         [place] 

[place] [id number]



                    9:55pm                                            



                                                                      



                                                                      

 

          Mean Corpuscular August    89 fL               80-94               [facility], [address]



          Volume    2020                                         [place] 

[place] [id number]



                    9:55pm                                            



                                                                      



                                                                      

 

          Mean Corpuscular August    30.6 pg             27.0-33.0           [facility], [address]



          Hemoglobin 2020                                         [place]

 [place] [id number]



                    9:55pm                                            



                                                                      



                                                                      

 

          Mean Corpuscular August    34.3 g/dL           33.0-37.0           [facility], [address]



          Hemoglobin 2020                                         [place] [id number]



          [place]   9:55pm                                            



                                                                      



                                                                      

 

          Red Cell  August    14.7 %              10.7-14.5           [facility], [address]



          Distribution 2020                                         [facility]

[place] [id number]



          Width     9:55pm                                            



                                                                      



                                                                      

 

          Platelet Count August    278                 150-450             [facility], [address]



                    2020 10*3/uL                                 [place] 

[place] [id number]



                    9:55pm                                            



                                                                      



                                                                      

 

          Mean Platelet August    9.7                 5.7-10.7            [facility], [address]



          Volume    2020                                         [place] 

[place] [id number]



                    9:55pm                                            



                                                                      



                                                                      

 

          Neutrophils (%) August    75 %                47-75               [facility], [address]



          (Auto)    ,                                          [place] 

[place] [id number]



                    9:55pm                                            



                                                                      



                                                                      

 

          Immature  August    0 %                 0-0                 [facility], [address]



          Granulocyte % 2020                                         [facility] [place] [id number]



          (Auto)    9:55pm                                            



                                                                      



                                                                      

 

          Lymphocytes (%) August    19 %                25-44               [facility], [address]



          (Auto)    2020                                         [place] 

[place] [id number]



                    9:55pm                                            



                                                                      



                                                                      

 

          Monocytes (%) August    6 %                 3-10                [facility], [address]



          (Auto)    2020                                         [facility] [address]



                    9:55pm                                            



                                                                      



                                                                      

 

          Eosinophils (%) August    0 %                 0-7                 [facility], [address]



          (Auto)    ,                                          [facility] [address]



                    9:55pm                                            



                                                                      



                                                                      

 

          Basophils (%) August    0 %                 0-1                 [facility], [address]



          (Auto)    2020                                         [place] 

[place] [id number]



                    9:55pm                                            



                                                                      



                                                                      

 

          Nucleated Red August    0.0 %               0-0.2               [facility], [address]



          Blood Cells % 2020                                         [facility] [place] [id number]



                    9:55pm                                            



                                                                      



                                                                      

 

          Neutrophils # August    3.7                 1.3-6.7             [facility], [address]



          (Auto)    2020 10*3/uL                                 [place] 

[place] [id number]



                    9:55pm                                            



                                                                      



                                                                      

 

          Immature  August    0.0                 0.0-0.0             [facility], [address]



          Granulocyte # 2020 10*3/uL                                 [facility] [id number]



          (Auto)    9:55pm                                            



                                                                      



                                                                      

 

          Lymphocytes # August    0.9                 1.4-4.1             [facility], [address]



          (Auto)    2020 10*3/uL                                 [place] 

[place] [id number]



                    9:55pm                                            



                                                                      



                                                                      

 

          Monocytes # August    0.3                 0-1.3               [facility], [address]



          (Auto)    2020 10*3/uL                                 [place] [address]



                    9:55pm                                            



                                                                      



                                                                      

 

          Eosinophils # August    0.0                 0-0.8               [facility], [address]



          (Auto)    2020 10*3/uL                                 [place] [id number]



                    9:55pm                                            



                                                                      



                                                                      

 

          Basophils # August    0.0                 0-0.1               [facility], [address]



          (Auto)    2020 10*3/uL                                 [place] 

[place] [address]



                    9:55pm                                            



                                                                      



                                                                      

 

          Nucleated Red August    0.00                0-0.01              [facility], [address]



          Blood Cells # 2020 10*3/uL                                 [place] [address]



                    9:55pm                                            



                                                                      



                                                                      

 

          Manual    August    Not Ind                                 [facility], [address]



          Differential 2020                                         [place] [address]



                    9:55pm                                            



                                                                      



                                                                      

 

          Urine Source August    URINE                                   [facility], [address] [place] [address]



                    2020                                         [place] 

[place] [address]



                    11:15pm                                           



                                                                      



                                                                      

 

          Urine Color August    Colorless           Yel-Megan           [facility], [address]



                    2020                     *                   [place] [id number]



                    11:15pm                                           



                                                                      



                                                                      

 

          Urine Appearance August    Clear               Clear *             [facility], [address]



                    2020                                         [place] 

[place] [address]



                    11:15pm                                           



                                                                      



                                                                      

 

          Urine pH  August    6.5                 5.0-8.0             [facility], [address]



                    2020                                         [place] [address]



                    11:15pm                                           



                                                                      



                                                                      

 

          Urine Specific August    1.027               1.005-1.03           [facility], [address]



          [place]   2020                     0                   [place] 

[place] [address]



                    11:15pm                                           



                                                                      



                                                                      

 

          Urine Protein August    Negative            Negative *           [facility], [address]



                    2020 mg/dL                                   [place] [address]



                    11:15pm                                           



                                                                      



                                                                      

 

          Urine Glucose August    >1000               Negative *           [facility], [address]



          (UA)      2020 mg/dL                                   [place] 

[place] [address]



                    11:15pm                                           



                                                                      



                                                                      

 

          Urine Ketones August    20 mg/dL            Negative *           [facility], [address]



                    2020                                         [place] 

[place] [address]



                    11:15pm                                           



                                                                      



                                                                      

 

          Urine Occult August    1+                  Negative *           [facility], [address]



          Blood     2020                                         [place] 

[place] [address]



                    11:15pm                                           



                                                                      



                                                                      

 

          Urine Nitrite August    Negative            Negative            [facility], [address]



                    2020                                         [place] [address]



                    11:15pm                                           



                                                                      



                                                                      

 

          Urine Bilirubin August    Negative            Negative            [facility], [address]



                    2020 mg/dL                                   [place] [address]



                    11:15pm                                           



                                                                      



                                                                      

 

          Urine     August    Negative            0.0-1.0             [facility], [address]



          Urobilinogen 2020 mg/dL                                   [place] [address]



                    11:15pm                                           



                                                                      



                                                                      

 

          Urine Leukocyte August    Negative            Negative            [facility], [address]



          Esterase  2020 {Vika}/uL                                [place] [address]



                    11:15pm                                           



                                                                      



                                                                      

 

          Microscopic August    -----                                   [facility], [address]



          Urinalysis (T) 2020                                         [place] [address]



                    11:15pm                                           



                                                                      



                                                                      

 

          Urine RBC August    3-10                0-2                 [facility], [address]



                    ,  /[HPF]                                  [place] 

[place] [address]



                    11:15pm                                           



                                                                      



                                                                      

 

          Urine WBC August    0-5 /[HPF]           0-5                 [facility], [address]



                    ,                                          [place] [address]



                    11:15pm                                           



                                                                      



                                                                      

 

          Urine Epithelial August    None Seen           Few                 [facility], [address]



          Cells     ,  /[HPF]                                  [place] 

[place] [address]



                    11:15pm                                           



                                                                      



                                                                      

 

          Urine Crystals August    None Seen           None *              [facility] PAUL

[facility], [address]



                    ,  /[HPF]                                  [place] [address]



                    11:15pm                                           



                                                                      



                                                                      

 

          Urine Bacteria August    None Seen           None                [facility]

guy Laboratory, [address]



                    ,  /[HPF]                                  [place] [address]



                    11:15pm                                           



                                                                      



                                                                      

 

          Urine Casts August    None Seen           None *              [facility] Olim[place]ia

[facility], [address]



                    ,  /[LPF]                                  [place] [address]



                    11:15pm                                           



                                                                      



                                                                      

 

          Urine Hyaline August    None Seen           0-1                 [facility], [address]



          Casts     ,  /[LPF]                                  [place] 

[place] [address]



                    11:15pm                                           



                                                                      



                                                                      

 

          Urine Yeast August    None Seen           None                [facility], [address]



                    ,  /[HPF]                                  [place] [address]



                    11:15pm                                           



                                                                      



                                                                      

 

          Urinalysis August    *                   *         Ref Range [facility] * [facility] [address]



          Comment   2020                               Clinical  [facility] [address]



                    11:15pm                                 evaluation 



                                                            required. 



                                                                      

 

          Urine Culture August    Not Ind                                 [facility], [address]



          Indicated 2020                                         [facility] [address]



                    11:15pm                                           



                                                                      



                                                                      

 

          Sodium Level August    133 mmol/L           136-145             [facility], [address]



                    2020                                         [facility] [address]



                    9:55pm                                            



                                                                      



                                                                      

 

          Potassium Level August    5.0 mmol/L           3.5-5.1             [facility], [address]



                    2020                                         [facility] [address]



                    9:55pm                                            



                                                                      



                                                                      

 

          Chloride Level August    96 mmol/L                         [facility], [address]



                    2020                                         [facility] [address]



                    9:55pm                                            



                                                                      



                                                                      

 

          Carbon Dioxide August    22 mmol/L                          [facility], [address]



          Level     2020                                         [facility] [address]



                    9:55pm                                            



                                                                      



                                                                      

 

          Anion Gap -                [facility], [address]



                    2020                                         [facility] [address]



                    9:55pm                                            



                                                                      



                                                                      

 

          Blood Urea August    11 mg/dL            -                [facility], [address]



          Nitrogen  2020                                         [facility] [address]



                    9:55pm                                            



                                                                      



                                                                      

 

          Creatinine August    1.0 mg/dL           0.7-1.3             [facility], [address]



                    2020                                         [facility] [address]



                    9:55pm                                            



                                                                      



                                                                      

 

          Estimat   August    84                            Stages [facility], [address]



          Glomerular 2020                               of Patients [facility] [address]



          Filtration Rate 9:55pm                                  with      



                                                             Estimated 



                                                            GFR       



                                                            Known Kidney 



                                                            Disease   



                                                                      



                                                            (ml/min/1.73 



                                                            sq.meters)Sta 



                                                            ge 1 - Kidney 



                                                            damage    



                                                            w/normal  



                                                            90 mL/min or 



                                                            greater   



                                                                or    



                                                            increased 



                                                            GFRStage 2 - 



                                                            Kidney    



                                                            disease   



                                                            w/mildly  



                                                            60-89 mL/min 



                                                                      



                                                            decreased 



                                                            GFRStage 3 - 



                                                            Moderately 



                                                            decreased GFR 



                                                               30-59  



                                                            mL/minStage 4 



                                                            - Severely 



                                                            decreased GFR 



                                                                 15-29 



                                                            mL/minStage 5 



                                                            - Kidney  



                                                            failure   



                                                                   14 



                                                            mL/min or 



                                                            lessTo    



                                                            estimate the 



                                                            GFR for   



                                                               



                                                            Americans, 



                                                            multiply the 



                                                            result    



                                                            provided by 



                                                            1.21.     



                                                                      

 

          Glucose Level August    545 mg/dL               Critical  [facility], [address]



                    2020                               value: [place] [address]



                    9:55pm                                  to BRIAN Domingo 



                                                            [address]   



                                                            20 by 



                                                            [id number] 



                                                            lt read-back 



                                                            successful. Y 



                                                                      



                                                                      

 

          Calcium Level August    8.2 mg/dL           8.4-10.2            [facility], [address]



                    ,                                          [place] [id number]



                    9:55pm                                            



                                                                      



                                                                      

 

          Total Bilirubin August    0.5 mg/dL           0.2-1.2             [facility], [address]



                    ,                                          [place] [address]



                    9:55pm                                            



                                                                      



                                                                      

 

          Aspartate Amino August    47 U/L              5-34                [facility], [address]



          Transf    2020                                         [place] 

[place] [address]



          (AST/SGOT) 9:55pm                                            



                                                                      



                                                                      

 

          Alanine   August    66 U/L              0-55                [facility], [address]



          Aminotransferase 2020                                         [place] [address]



          (ALT/SGPT) 9:55pm                                            



                                                                      



                                                                      

 

          Total Protein August    5.7 g/dL            6.4-8.3             [facility], [address]



                    2020                                         [place] 

[place] [address]



                    9:55pm                                            



                                                                      



                                                                      

 

          Albumin   August    3.3 g/dL            3.5-5.0             [facility], [address]



                    2020                                         [place] 

[place] [address]



                    9:55pm                                            



                                                                      



                                                                      

 

          Alkaline  August    548 U/L                           [facility], [address]



          Phosphatase 2020                                         [place] [address]



                    9:55pm                                            



                                                                      



                                                                      

 

          Bedside Sodium August    133 mmol/L           136-145             [facility], [address]



                    2020                                         [place] 

[place] [address]



                    9:56pm                                            



                                                                      



                                                                      

 

          Bedside   August    5.0 mmol/L           3.5-5.1             [facility], [address]



          Potassium 2020                                         [place] 

[place] [address]



                    9:56pm                                            



                                                                      



                                                                      

 

          Bedside Chloride August    92 mmol/L           100-112             [facility], [address]



                    2020                                         [place] 

[place] [address]



                    9:56pm                                            



                                                                      



                                                                      

 

          Bedside Total August    25.0                24.0-33.0           [facility], [address]



          CO2       2020 mmol/L                                  [place] 

[place] [address]



                    9:56pm                                            



                                                                      



                                                                      

 

          Bedside Blood August    11 mg/dL            6-20                [facility], [address]



          Urea Nitrogen 2020                                         [facility] [place] [address]



                    9:56pm                                            



                                                                      



                                                                      

 

          Bedside   August    0.7 mg/dL           0.9-1.5             [facility], [address]



          Creatinine 2020                                         [facility] [address]



                    9:56pm                                            



                                                                      



                                                                      

 

          Bedside Glucose August    490 mg/dL               Critical  [facility], [address]



                    2020                               value     [facility] [address]



                    9:56pm                                  reported at 



                                                            bedside.POC 



                                                            testing   



                                                            performed at 



                                                            the bedside. 



                                                            Immediate 



                                                            notification 



                                                            of results 



                                                            given to  



                                                            nurse/physici 



                                                            an at time of 



                                                            testing.  



                                                                      

 

          Bedside Whole                 1.12-1.32           [facility], [address]



          Blood Ionized 2020 mmol/L                                  [facility] [address]



          Calcium   9:56pm                                            



                                                                      



                                                                      

 

          Bedside Anion -18                [facility], [address]



          Gap       2020                                         [facility] [address]



                    9:56pm                                            



                                                                      



                                                                      

 

          Estimat   August    127                           [facility], [address]



          Glomerular 2020                               of [place] [facility] [place] [address]



          Filtration Rate 9:56pm                                  with      



                                                             Estimated 



                                                            GFR       



                                                            Known Kidney 



                                                            Disease   



                                                                      



                                                            (ml/min/1.73 



                                                            sq.meters)Sta 



                                                            ge 1 - Kidney 



                                                            damage    



                                                            w/normal  



                                                            90 mL/min or 



                                                            greater   



                                                                or    



                                                            increased 



                                                            GFRStage 2 - 



                                                            Kidney    



                                                            disease   



                                                            w/mildly  



                                                            60-89 mL/min 



                                                                      



                                                            decreased 



                                                            GFRStage 3 - 



                                                            Moderately 



                                                            decreased GFR 



                                                               30-59  



                                                            mL/minStage 4 



                                                            - Severely 



                                                            decreased GFR 



                                                                 15-29 



                                                            mL/minStage 5 



                                                            - Kidney  



                                                            failure   



                                                                   14 



                                                            mL/min or 



                                                            lessTo    



                                                            estimate the 



                                                            GFR for   



                                                               



                                                            Americans, 



                                                            multiply the 



                                                            result    



                                                            provided by 



                                                            1.21.     



                                                                      

 

          Bedside   August    12.2 g/dL           13.0-17.5           [facility], [address]



          Hemoglobin 2020                                         [facility] [address]



                    9:56pm                                            



                                                                      



                                                                      

 

          Bedside   August    36.0 %              40.0-53.0           [facility], [address]



          Hematocrit 2020                                         [place]

 [place] [address]



                    9:56pm                                            



                                                                      



                                                                      

 

          Bedside Glucose August    289 mg/dL                         [facility], [address]



                    2020                                         [facility] [address]



                    2:58am                                            



                                                                      



                                                                      





Diagnostic Imaging Reports







                Report          Dictated Date/Time Dictated By     Status



                                                                

 

                Abdomen/Pelvis CT 2020 KORINA HERNANDEZ MD comple

conrad



                                2:03am                          



                                                                









                                        Computed tomography (CT) scan examinatio

ns are performed using one or more of



                                        the following dose reduction techniques:

 Automated exposure control, adjustment



                                        of the mA and/or kV according to patient

 size, or use of iterative



                                        reconstruction technique.



                                        



                                        CT abdomen/pelvis post-IV contrast enhan

cement



                                        



                                        Comparison CT abdomen/pelvis 4/15/2020



                                        



                                        HISTORY: Hyperglycemia chronic pancreati

tis upper abdominal pain vomiting



                                        elevated liver function tests nausea vom

iting diarrhea chronic pancreatitis



                                        hyperglycemia diabetes hepatitis C



                                        



                                        CT abdomen/pelvis: There are coarse calc

ifications throughout the pancreas,



                                        consistent with chronic pancreatitis. Th

ere is pancreatic atrophy. No other



                                        significant solid organ pathology identi

fied. No biliary pathology identified.



                                        There is generalized paucity of body fat

. There is nonspecific diffuse fat



                                        stranding/edema of the visualized fat. T

here is large colonic stool and bowel



                                        gas. There is atherosclerosis of the aor

ta and iliac arteries. Degenerative



                                        changes spine.



                                        



                                        IMPRESSION:



                                        1. Chronic pancreatitis.



                                        2. Colonic fecal stasis.



                                        3. Anasarca.



                                        



                                        Dictated By: KORINA HERNANDEZ MD



                                        Date Dictated: 20



                                        



                                        Signed by: KORINA HERNANDEZ MD



                                        Date Signed: 20







Health Concerns

Health Concerns may be documented in an alternate section.



Advance Directives







                    Advance Directive   Response            Recorded Date/Time



                                                            

 

                    Does the Patient have an No                   9:36pm



                    Advance Directive?                      



                                                            







Chief Complaint and Reason for Visit







                          Chief Complaint           Blood Sugar - High

 

                          Reason for Visit          [id number]



                                                    [id number]



                                                    [id number]



                                                    [id number]



                                                    [id number]



                                                    [id number]



                                                    [id number]



                                                    [id number]



                                                    [id number]



                                                    [id number]



                                                    [id number]



                                                    [id number]







Encounters







             Encounter    Location(s)  Arrival/Admit Date Discharge/Depart Date 

Provider(s)



                                                                 

 

             Departed     [facility] [[email or web address]  DO LAGUERRE



             Emergency Room [place]    9:39pm       4:55am       MD



                                                                 



                                                                 







Assessments

No Assessments Information Available



Functional Status







                    Observation         Response            Date Recorded



                                                            

 

                    Onset Within the Last 7 Days No Problem Identified  9:36pm



                                                            







Goals

Goals may be documented in an alternate section.



Immunizations

No Immunization Information Available



Mental Status

No Mental Status Information Available



Medical Equipment

No Medical Equipment Information available



Insurance Providers







                          Guarantor                 Deric Dagoberto PAREDES

 

                          Address                   [address] [place] [id number]

 

                          Contact Info.             Home Phone:  [phone]











          Payer     Policy Id Coverage Id Subscriber's Subscriber Effective Expi

ration



                                        Name      Id        Date      Date



                                                                      

 

          [employer] [id number]           Dagoberto Sandoval [id number] January   



          [address]                       R                   2019 



                                                                      



                                                                      







Plan of Treatment

Future Tests

Future scheduled test information is unavailable

Pending Tests







                          Test Name                 Date ordered

 

                          Venous Blood Hemoglobin   2019 4:05pm





Future Visits

Future appointment information is unavailable

Referrals to Other Providers







             Reason for   Referral Start Provider     Provider Contact Provider 

Address



             Referral     Date                      Information  

 

                                       [facility], [facility]              

 

                                       DO, [facility]              



                                       PT.                       

 

                                       [place], [facility]              

 

                                       [place], [facility]              

 

                                       SERV, [facility]              

 

                                       PCP, NO                   





Future Procedures

Future procedure information is unavailable

Future Medications

Future medication information is unavailable

Patient Instructions







                                        Pancreatitis

 

                                        Dehydration, Adult (DC)

 

                                        High Blood Pressure (DC)

 

                                        Severe Abdominal Pain, Adult (DC)

 

                                        Nausea and Vomiting, Adult (DC)

 

                                        Degenerative Disc Disease (DC)

 

                                        Alkaline Phosphatase Test

 

                                        Diabetic Meal Planning







Social History

Smoking Status







                          Status                    Date of Observation

 

                          Smokes tobacco daily (finding) 2020 12:36

am



                                                    





Observation Status







                    Observation         Response            Date of Response



                                                            

 

                    Hx Tobacco Use      Yes                 2020 12

:36am



                                                            











                          Assigned Birth Sex        Male







Vital Signs







                Vital Reading   Result          Reference Range Collection Date/

Time



                                                                

 

                Body Temperature 98.1 [degF]     (97.6 - 99.5)   



                                                                4:54am

 

                Heart Rate      68 /min         (60 - 100)      

0



                                                                9:53pm

 

                Heart Rate      76 /min                         

0



                                                                4:54am

 

                Respiratory rate 14 /min         (12 - 24)       



                                                                9:36pm

 

                Respiratory rate 19 /min                         



                                                                4:54am

 

                Weight          107.25 [lb_av]                  

0



                                                                10:12pm

 

                BMI (Body Mass Index) 17.8 kg/m2                      



                                                                10:12pm

## 2020-08-28 NOTE — ER
Nurse's Notes                                                                                     

 [facility]                                                                 

Name: Ranjeet Leos                                                                                 

Age: 49 yrs                                                                                       

Sex: Male                                                                                         

: 1970                                                                                   

MRN: [id number]                                                                                   

Arrival Date: 2020                                                                          

Time: 20:46                                                                                       

Account#: [id number]                                                                            

Bed 3                                                                                             

Private MD:                                                                                       

Diagnosis: Hypoglycemia, unspecified;Type 1 diabetes mellitus;Hypomagnesemia;Hypokalemia;Pneumonia

  due to other specified bacteria-right upper and lower pneumonia;Hypoxemia;Alcohol               

  abuse;Adverse effect of amphetamines;Abuse of non-psychoactive                                  

  substances-METH;Neutropenia;Bandemia;Respiratory failure, unspecified with                      

  hypoxia;Severe sepsis with septic shock                                                         

                                                                                                  

Presentation:                                                                                     

                                                                                             

20:47 Chief complaint: EMS states: Reports family found him unresponsive, EMS reported BGL at ea  

      48, EMS gave D10 IV. Pt reports he has been using meth for the past 5 days. Coronavirus     

      screen: At this time, the client does not indicate any symptoms associated with             

      coronavirus-19. Ebola Screen: No symptoms or risks identified at this time. Initial         

      Sepsis Screen: Does the patient meet any 2 criteria? No. Patient's initial sepsis           

      screen is negative. Does the patient have a suspected source of infection? No.              

      Patient's initial sepsis screen is negative. Risk Assessment: Do you want to hurt           

      yourself or someone else? Patient reports no desire to harm self or others. Onset of        

      symptoms was 2020.                                                               

20:47 Method Of Arrival: EMS: [facility]                                                ea  

20:47 Acuity: SUAD 3                                                                           ea  

                                                                                                  

Triage Assessment:                                                                                

20:47 General: Appears cachectic, Behavior is cooperative. Pain: Denies pain.                 ea  

                                                                                                  

Historical:                                                                                       

- Allergies:                                                                                      

21:08 No Known Allergies;                                                                     ea  

- PMHx:                                                                                           

21:08 Diabetes - IDDM;                                                                        ea  

                                                                                                  

- Immunization history:: Adult Immunizations up to date.                                          

- Family history:: not pertinent.                                                                 

- Social history:: Smoking status: Patient denies any tobacco usage or history of.                

  Patient uses street drugs, Methamphetamine (Meth).                                              

                                                                                                  

                                                                                                  

Screenin:52 Abuse screen: Denies threats or abuse. Nutritional screening: No deficits noted.        ea  

      Tuberculosis screening: No symptoms or risk factors identified. Fall Risk None              

      identified.                                                                                 

                                                                                                  

Assessment:                                                                                       

22:06 Reassessment: see triage assessment .                                                   ll2 

22:20 Reassessment: pt stated, "i dont have my crazy medicine here and i'm having weird       ll2 

      thoughts in my head i may just want to go home." ERD notified and verbal order for 1mg      

      of ativan IVP. Neuro: Level of Consciousness is awake, alert, obeys commands, Oriented      

      to person, place, time, situation.                                                          

22:59 Reassessment: candy from laboratory called for the result band 12 WBC 1.1 ED provider   rr5 

      aware.                                                                                      

23:56 Reassessment: Patient appears in no apparent distress at this time. Patient and/or      ll2 

      family updated on plan of care and expected duration. Pain level reassessed. Patient is     

      alert, oriented x 3, equal unlabored respirations, skin warm/dry/pink. after ct, [facility]        

      hospitalist came to inform pt of admission to ICU.                                          

                                                                                             

00:00 Reassessment: received patient drowsy, respond to verbal stimuli, O2 saturation 84% on  rr5 

      oxygen at 5 liters per nasal cannula. ED provider aware with order of stat ABG.             

00:30 Reassessment: reassess by ED provider at bedside.RT shift the oxygen to NRM at 15 liter rr5 

      oxygen went up to 96%.                                                                      

00:55 Reassessment: ED provider at bedside decided to intubate the patient.                   rr5 

02:58 Reassessment: BS 57 mg/DL ED provider informed with order made and carried out.         rr5 

03:15 Reassessment: T 95 .5 F bear hugger applied. propofol started at 5 mcg/min.             rr5 

06:54 Reassessment: attempted to call Dr Rowley with critical lab result of Calcium of 6.4 no bb  

      answer.                                                                                     

                                                                                                  

Vital Signs:                                                                                      

                                                                                             

20:47 BP 95 / 63; Pulse 110; Resp 20; Temp 99.3; Pulse Ox 79% on R/A; Weight 45.36 kg; Height ea  

      5 ft. 5 in. (165.10 cm);                                                                    

22:12  / 77; Pulse 104; Resp 16; Pulse Ox 95% on 5 lpm NC;                              ll2 

23:48 BP 92 / 76; Pulse 105; Resp 12; Pulse Ox 84% on 5 lpm NC;                               ll2 

                                                                                             

00:00  / 85; Pulse 89; Resp 14; Pulse Ox 84% on 5 lpm NC;                               rr5 

00:30 Pulse Ox 94% on 15% Non-rebreather mask;                                                rr5 

01:00  / 74; Pulse 79; Resp 20; Pulse Ox 99% on ETT vent;                               rr5 

01:35  / 108; Pulse 87; Resp 18; Pulse Ox 97% ;                                         ea  

02:22  / 73; Pulse 89; Resp 20; Pulse Ox 99% on ETT vent;                               ea  

02:54 BP 93 / 65; Pulse 81; Resp 25; Temp 96.2; Pulse Ox 99% on ETT vent;                     rr5 

03:15  / 62; Pulse 89; Resp 24; Temp 95.5; Pulse Ox 100% on ETT vent;                   rr5 

03:45  / 72; Pulse 82; Resp 27; Temp 95; Pulse Ox 99% on ETT vent;                      rr5 

04:15  / 62; Pulse 80; Resp 28; Temp 95.8; Pulse Ox 100% on ETT vent;                   rr5 

04:45  / 68; Pulse 87; Resp 23; Temp 96; Pulse Ox 100% on ETT vent;                     rr5 

                                                                                             

20:47 Body Mass Index 16.64 (45.36 kg, 165.10 cm)                                             ea  

                                                                                             

20:47 pt placed on 5L per nasal canula                                                        ea  

                                                                                                  

ED Course:                                                                                        

20:46 Patient arrived in ED.                                                                  ea  

20:47 Augustine Hoover MD is Attending Physician.                                             mag 

20:47 Patient has correct armband on for positive identification. Placed in gown. Bed in low  ea  

      position. Cardiac monitor on. Pulse ox on. NIBP on.                                         

20:52 Triage completed.                                                                       ea  

21:05 Arm band placed on right wrist. Patient placed in an exam room, on a stretcher, on      ea  

      pulse oximetry.                                                                             

21:07 No provider procedures requiring assistance completed.                                  ea  

21:29 Radiology exam delayed due to IV insertion attempt and/or patient not having            vm2 

      appropriate IV at this time.                                                                

21:30 Inserted saline lock: 22 gauge in right antecubital area, using aseptic technique.      vc  

      Blood collected.                                                                            

21:32 XRAY Chest (1 view) In Process Unspecified.                                             EDMS

21:59 Ratna Yañez, MARYBEL is Primary Nurse.                                                  ll2 

22:10 Warm blanket given. placed socks on.                                                    ll2 

23:17 Mandeep Culp MD is Hospitalizing Provider.                                           mag 

23:30 CT Abd/Pelvis - IV Contrast Only In Process Unspecified.                                EDMS

23:50 Inserted saline lock: 22 gauge in right hand, using aseptic technique.                  rr5 

                                                                                             

01:05 Inserted saline lock: 18 gauge in right EJ, using aseptic technique. ,using aseptic     rr5 

      technique. inserted by dr. hoover.                                                        

01:05 Assisted provider with intubation using 7.5 mm ETT via oral route. ET tube secured at   rr5 

      23cm at the lips. Set up intubation tray. Intubated by Augustine Hoover MD Placement          

      verified by CO2 detector w/ + color change, auscultating bilateral breath sounds,           

      End-tidal CO2 montioring CXR, Patient tolerated well.                                       

01:25 IV discontinued, intact, bleeding controlled, No redness/swelling at site. Pressure     rr5 

      dressing applied, G22 at right AC and G20 left Forearm positive infiltration.               

01:40 Amaro cath inserted, using sterile technique, 16 Fr., by me, balloon inflated, to       rr5 

      gravity drainage, urine specimen collected.                                                 

01:45 NGT: inserted 16 Fr. via left nare. verified placement of air over stomach, verified    rr5 

      return of gastric contents, Placement verified by X-ray, to intermittent suction.           

      Returned gastric contents. Patient tolerated well.                                          

                                                                                                  

Restraints:                                                                                       

02:00 Non-Violent Restraint: Order obtained. Initiated on 2020 at 01:00 Unable to  rr5 

      provide Restraint education. Intubated. Actions/Behavior observed:                          

      Confused/disoriented, has difficulty remembering/follow instructions, has impaired          

      decision making, repeated attempts to get up from bed/chair w/o assistance, has             

      decreased level of consciousness, unable to follow instructions, repeated attempts to       

      remove/tamper lines/tubes/IV med devices \T\ wound dressing, Less restrictive               

      alternatives attempted: decrease environmental stimuli, reoriented to location,             

      medications evaluated, repositioned, Alternative interventions: Ineffective. Clinical       

      justification for use: airway protection, line protection, patient safety, Mental           

      status: confused, Cognition: Unable to assess. impulsive, poor attention/concentration,     

      unable to follow commands, Circulation: Within defined parameters (based on                 

      Cardiovascular assessment) Skin integrity: Within defined parameters (based on              

      Integumentary assessment) Signs of injury related to restraint: No injuries noted.          

      Elimination/Hygiene: perineal care performed, with urinary catheter, Restraint status:      

      Soft wrist restraint (Right) Started. Soft wrist restraint (Left) Started.                  

04:00 Non-Violent Restraint: Actions/Behavior observed: Confused/disoriented, has difficulty  rr5 

      remembering/follow instructions, has impaired decision making, repeated attempts to get     

      up from bed/chair w/o assistance, has decreased level of consciousness, unable to           

      follow instructions, repeated attempts to remove/tamper lines/tubes/IV med devices \T\      

      wound dressing, repeated attempts to remove artifical airway/mechanical resp support,       

      Cognition: poor judgement, poor safety awareness, impulsive, poor                           

      attention/concentration, unable to follow commands, Circulation: Within defined             

      parameters (based on Cardiovascular assessment) Skin integrity: Within defined              

      parameters (based on Integumentary assessment) Signs of injury related to restraint: No     

      injuries noted. Range of Motion (ROM): performed. Elimination/Hygiene: with urinary         

      catheter, Restraint status: Soft wrist restraint (Right) Continued. Soft wrist              

      restraint (Left) Continued.                                                                 

06:00 Non-Violent Restraint: Unable to provide Restraint education. intubated.                rr5 

      Actions/Behavior observed: Confused/disoriented, has difficulty remembering/follow          

      instructions, has impaired decision making, repeated attempts to get up from bed/chair      

      w/o assistance, has decreased level of consciousness, unable to follow instructions,        

      repeated attempts to remove/tamper lines/tubes/IV med devices \T\ wound dressing,           

      repeated attempts to remove artifical airway/mechanical resp support, Clinical              

      justification for use: airway protection, line protection, patient safety, Mental           

      status: confused, Cognition: Unable to assess. poor judgement, poor safety awareness,       

      impulsive, poor attention/concentration, unable to follow commands, Circulation: Within     

      defined parameters (based on Cardiovascular assessment) Skin integrity: Within defined      

      parameters (based on Integumentary assessment) Signs of injury related to restraint: No     

      injuries noted. Range of Motion (ROM): performed. Elimination/Hygiene: perineal care        

      performed, with urinary catheter, Restraint status: Soft wrist restraint (Right)            

      Continued. Soft wrist restraint (Left) Continued.                                           

                                                                                                  

Administered Medications:                                                                         

      Discontinued: D5-1/2 NS 1000 ml IV at 125 ml/hr continuous                                  

                                                                                             

20:57 CANCELLED (Duplicate Order): Rocephin 1 grams IV at per protocol once; Given slow IV    mag 

      push per pharmacy instructions                                                              

21:24 Drug: NS 0.9% 500 ml Route: IV; Rate: bolus; Site: left hand;                           ll2 

22:33 Follow up: Response: No adverse reaction                                                ll2 

23:30 Follow up: Response: No adverse reaction; IV Status: Completed infusion; IV Intake:     rr5 

      500ml                                                                                       

21:25 Drug: Thiamine 100 mg Route: IV; Rate: bolus; Site: left wrist;                         ll2 

22:25 Follow up: Response: No adverse reaction; IV Status: Completed infusion                 rr5 

22:33 Follow up: Response: No adverse reaction                                                ll2 

21:25 Drug: NS 0.9% 1000 ml Route: IV; Rate: 1 bolus; Site: left hand;                        2 

                                                                                             

00:00 Follow up: Response: No adverse reaction; IV Status: Completed infusion; IV Intake:     rr5 

      1000ml                                                                                      

                                                                                             

21:25 Drug: D5-1/2 NS 1000 ml Route: IV; Rate: 125 ml/hr; Site: left hand;                    ll2 

22:33 Follow up: Response: No adverse reaction                                                ll2 

22:33 Follow up: Response: No adverse reaction                                                2 

                                                                                             

03:12 Follow up: IV Status: Order to discontinue infusion                                     rr5 

                                                                                             

22:00 Drug: Zosyn 3.375 grams Route: IVPB; Infused Over: 60 mins; Site: right upper arm;      ll2 

22:33 Follow up: Response: No adverse reaction                                                2 

                                                                                             

01:30 Follow up: Response: No adverse reaction; IV Status: Completed infusion; IV Intake:     rr5 

      100ml                                                                                       

                                                                                             

22:31 Drug: Ativan 1 mg Route: IVP; Site: left wrist;                                         ll2 

22:34 Follow up: Response: No adverse reaction; RASS: Alert and Calm (0)                      ll2 

22:49 Follow up: Response: Anxiety decreased                                                  ll2 

23:55 Follow up: Response: No adverse reaction                                                ll2 

22:49 Drug: vancoMYCIN 1 grams Route: IVPB; Infused Over: 2 hrs; Site: left wrist;            2 

                                                                                             

03:00 Follow up: Response: No adverse reaction; IV Status: Completed infusion; IV Intake:     rr5 

      250ml                                                                                       

00:55 Drug: Versed 4 mg Route: IVP; Site: right hand;                                         rr5 

01:00 Follow up: Response: No adverse reaction                                                ea  

00:56 Drug: Etomidate 10 mg Route: IVP; Site: right hand;                                     rr5 

01:00 Follow up: Response: No adverse reaction                                                ea  

01:25 Drug: NS 0.9% 1000 ml {Note: right femoral.} Route: IV; Rate: 1 bolus; Site: Other;     rr5 

02:43 Follow up: Response: No adverse reaction; IV Status: Completed infusion; IV Intake:     ea  

      1000ml                                                                                      

01:25 Drug: NS 0.9% 500 ml {Note: right femoral.} Route: IV; Rate: bolus; Site: Other;        rr5 

02:43 Follow up: IV Status: Completed infusion; IV Intake: 500ml                              ea  

01:50 Drug: Magnesium Sulfate 1 grams {Note: right femoral.} Route: IVPB; Infused Over: 1     rr5 

      hrs; Site: Other;                                                                           

02:50 Follow up: Response: No adverse reaction; IV Status: Completed infusion; IV Intake: [id number] 

02:00 Drug: Potassium Chloride 20 mEq Route: IV; Rate: per protocol; Site: Other;             rr5 

03:00 Follow up: Response: No adverse reaction; IV Status: Completed infusion; IV Intake:     rr5 

      100ml                                                                                       

02:04 Not Given (intubated): Potassium Effervescent Tablet 25 mEq PO once; dissolve in 4      rr5 

      ounces of water or juice                                                                    

02:30 Drug: Pepcid 20 mg {Note: right femoral.} Route: IVP; Site: Other;                      rr5 

03:55 Follow up: Response: No adverse reaction                                                rr5 

03:09 Not Given (Other Intervention Used; D5 1/2NS at 100 ml/hr ordered in [email or web address]): D5-NS   rr5 

      with KCL 20 mEq/L 1000 ml IV at 125 ml/hr continuous                                        

03:15 Drug: D50W 50 ml {Note: right femoral.} Route: IVP; Site: Other;                        rr5 

04:15 Follow up: Response: No adverse reaction; Blood sugar is elevated                       rr5 

03:15 Drug: Propofol 5 mcg/kg/min {Note: right femoral.} Route: IV; Rate: calculated rate;    rr5 

      Site: Other;                                                                                

03:40 Follow up: Rate change 10 mcg/kg/min                                                    rr5 

04:15 Follow up: Rate change 15 mcg/kg/min                                                    rr5 

07:10 Follow up: Response: No adverse reaction; IV Status: Infusion continued upon admission  rr5 

                                                                                                  

                                                                                                  

Intake:                                                                                           

                                                                                             

23:30 IV: 500ml; Total: 500ml.                                                                rr5 

                                                                                             

00:00 IV: 1000ml; Total: 1500ml.                                                              rr5 

01:30 IV: 100ml; Total: 1600ml.                                                               rr5 

02:43 IV: 500ml; Total: 2100ml.                                                               ea  

02:43 IV: 1000ml; Total: 3100ml.                                                              ea  

02:50 IV: 25ml; Total: 3125ml.                                                                rr5 

03:00 IV: 100ml; Total: 3225ml.                                                               rr5 

03:00 IV: 250ml; Total: 3475ml.                                                               rr5 

                                                                                                  

Outcome:                                                                                          

                                                                                             

23:20 Decision to Hospitalize by Provider.                                                    [facility] 

                                                                                             

02:38 Admitted to ER Hold.  Please see [facility] for further documentation.                    ea  

      Condition: stable                                                                           

      Instructed on pt sedated and intubated                                                      

07:38 Patient left the ED.                                                                    sv  

                                                                                                  

Signatures:                                                                                       

Dispatcher [employer]                           EDMS                                                 

Maricarmen Knight RN                    RN   Augustine Gross MD MD cha Ballard, Brenda RN                     RN   Nila Quiñones                            [facility]                                                  

China Quigley RN RN ea Roque, Raymond RN                      RN   rr5                                                  

Akilah Roland RN RN vc Linscombe, Lacie, RN                    RN   ll2                                                  

                                                                                                  

Corrections: (The following items were deleted from the chart)                                    

03:54 03:53 Pepcid 20 mg IVP in Other rr5                                                     rr5 

04:44 00:00  / 85; Pulse 89bpm; Resp 14bpm; Pulse Ox 80% 5 lpm Nasal Cannula; rr5       rr5 

                                                                                                  

**************************************************************************************************

## 2020-08-28 NOTE — EDPHYS
Physician Documentation                                                                           

 [facility]                                                                 

Name: Ranjeet Leos                                                                                 

Age: 49 yrs                                                                                       

Sex: Male                                                                                         

: 1970                                                                                   

MRN: [id number]                                                                                   

Arrival Date: 2020                                                                          

Time: 20:46                                                                                       

Account#: [id number]                                                                            

Bed 3                                                                                             

Private MD:                                                                                       

ED Physician Augustine Hoover                                                                      

HPI:                                                                                              

                                                                                             

20:52 This 49 yrs old  Male presents to ER via Unassigned with complaints of Low     mag 

      Blood Sugar.                                                                                

20:52 The patient or guardian reports hypoglycemia. Onset: The symptoms/episode               mag 

      began/occurred just prior to arrival. Associated signs and symptoms: Pertinent              

      positives: anorexia, using drugs, iv meth , siugar in the 40's ams, ems to er , c/o of      

      abd pain, positive etoh, hx pancreatitis. Current symptoms: In the emergency department     

      the patient's symptoms have improved, moderately, is more alert. It is unknown whether      

      or not the patient has had similar symptoms in the past.                                    

                                                                                                  

Historical:                                                                                       

- Allergies:                                                                                      

21:08 No Known Allergies;                                                                     ea  

- PMHx:                                                                                           

21:08 Diabetes - IDDM;                                                                        ea  

                                                                                                  

- Immunization history:: Adult Immunizations up to date.                                          

- Family history:: not pertinent.                                                                 

- Social history:: Smoking status: Patient denies any tobacco usage or history of.                

  Patient uses street drugs, Methamphetamine (Meth).                                              

                                                                                                  

                                                                                                  

ROS:                                                                                              

20:54 Constitutional: Negative for fever, chills, and weight loss, Eyes: Negative for injury, mag 

      pain, redness, and discharge, ENT: Negative for injury, pain, and discharge, Neck:          

      Negative for injury, pain, and swelling, Back: Negative for injury and pain, :            

      Negative for injury, bleeding, discharge, and swelling, MS/Extremity: Negative for          

      injury and deformity, Skin: Negative for injury, rash, and discoloration, Neuro:            

      Negative for headache, weakness, numbness, tingling, and seizure, Psych: Negative for       

      depression, anxiety, suicide ideation, homicidal ideation, and hallucinations,              

      Allergy/Immunology: Negative for hives, rash, and allergies, Endocrine: Negative for        

      neck swelling, polydipsia, polyuria, polyphagia, and marked weight changes,                 

      Hematologic/Lymphatic: Negative for swollen nodes, abnormal bleeding, and unusual           

      bruising.                                                                                   

20:54 Cardiovascular: Positive for palpitations.                                                  

20:54 Respiratory: Positive for cough.                                                            

20:54 Abdomen/GI: Positive for abdominal pain, of the epigastric area, right upper quadrant       

      and left upper quadrant.                                                                    

20:54 Skin: Positive for pallor.                                                                  

                                                                                                  

Exam:                                                                                             

20:55 Constitutional:  This is a well developed, well nourished patient who is awake, alert,  mag 

      and in no acute distress. Head/Face:  Normocephalic, atraumatic. Eyes:  Pupils equal        

      round and reactive to light, extra-ocular motions intact.  Lids and lashes normal.          

      Conjunctiva and sclera are non-icteric and not injected.  Cornea within normal limits.      

      Periorbital areas with no swelling, redness, or edema. ENT:  Nares patent. No nasal         

      discharge, no septal abnormalities noted.  Tympanic membranes are normal and external       

      auditory canals are clear.  Oropharynx with no redness, swelling, or masses, exudates,      

      or evidence of obstruction, uvula midline.  Mucous membranes moist. Neck:  Trachea          

      midline, no thyromegaly or masses palpated, and no cervical lymphadenopathy.  Supple,       

      full range of motion without nuchal rigidity, or vertebral point tenderness.  No            

      Meningismus. Chest/axilla:  Normal chest wall appearance and motion.  Nontender with no     

      deformity.  No lesions are appreciated. Back:  No spinal tenderness.  No costovertebral     

      tenderness.  Full range of motion. Male :  Normal genitalia with no discharge or          

      lesions. MS/ Extremity:  Pulses equal, no cyanosis.  Neurovascular intact.  Full,           

      normal range of motion. Neuro:  Awake and alert, GCS 15, oriented to person, place,         

      time, and situation.  Cranial nerves II-XII grossly intact.  Motor strength 5/5 in all      

      extremities.  Sensory grossly intact.  Cerebellar exam normal.  Normal gait. Psych:         

      Awake, alert, with orientation to person, place and time.  Behavior, mood, and affect       

      are within normal limits.                                                                   

20:55 Cardiovascular: Rate: tachycardic, Rhythm: regular, Pulses: Pulses are 4+ in bilateral      

      radial, brachial, femoral, popliteal, posterior tibial and and dorsalis pedis               

      arteries.. Heart sounds: normal, Edema: is not appreciated, JVD: is not appreciated.        

20:55 Respiratory: the patient does not display signs of respiratory distress,  Respirations:     

      normal, Breath sounds: bronchial sounds, decreased breath sounds.                           

20:55 Abdomen/GI: Inspection: abdomen appears normal, Bowel sounds: normal, Palpation: mild       

      abdominal tenderness, in the epigastric area, right upper quadrant and left upper           

      quadrant, Liver: no appreciated palpable abnormalities, Hernia: not appreciated.            

                                                                                             

02:38 ECG was reviewed by the Attending Physician.                                            [facility] 

                                                                                                  

Vital Signs:                                                                                      

                                                                                             

20:47 BP 95 / 63; Pulse 110; Resp 20; Temp 99.3; Pulse Ox 79% on R/A; Weight 45.36 kg; Height ea  

      5 ft. 5 in. (165.10 cm);                                                                    

22:12  / 77; Pulse 104; Resp 16; Pulse Ox 95% on 5 lpm NC;                              ll2 

23:48 BP 92 / 76; Pulse 105; Resp 12; Pulse Ox 84% on 5 lpm NC;                               ll2 

                                                                                             

00:00  / 85; Pulse 89; Resp 14; Pulse Ox 84% on 5 lpm NC;                               rr5 

00:30 Pulse Ox 94% on 15% Non-rebreather mask;                                                rr5 

01:00  / 74; Pulse 79; Resp 20; Pulse Ox 99% on ETT vent;                               rr5 

01:35  / 108; Pulse 87; Resp 18; Pulse Ox 97% ;                                         ea  

02:22  / 73; Pulse 89; Resp 20; Pulse Ox 99% on ETT vent;                               ea  

02:54 BP 93 / 65; Pulse 81; Resp 25; Temp 96.2; Pulse Ox 99% on ETT vent;                     rr5 

03:15  / 62; Pulse 89; Resp 24; Temp 95.5; Pulse Ox 100% on ETT vent;                   rr5 

03:45  / 72; Pulse 82; Resp 27; Temp 95; Pulse Ox 99% on ETT vent;                      rr5 

04:15  / 62; Pulse 80; Resp 28; Temp 95.8; Pulse Ox 100% on ETT vent;                   rr5 

04:45  / 68; Pulse 87; Resp 23; Temp 96; Pulse Ox 100% on ETT vent;                     rr5 

                                                                                             

20:47 Body Mass Index 16.64 (45.36 kg, 165.10 cm)                                             ea  

                                                                                             

20:47 pt placed on 5L per nasal canula                                                          

                                                                                                  

Procedures:                                                                                       

                                                                                             

01:21 Intubation: Ventilated with 100% NRB prior to procedure. Intubated orally using # 3     mag 

      Sanjay blade with 7.5 mm ETT. was successful on first attempt. Patient tolerated         

      well. Central Line: the site was prepped with Betadine, in sterile fashion, a triple        

      lumen catheter was inserted, in the right femoral vein, in 1 attempts. placement was        

      verified, by blood return, the site was dressed with using sterile technique, the           

      patient tolerated the procedure, well.                                                      

                                                                                                  

MDM:                                                                                              

                                                                                             

20:47 Patient medically screened.                                                             [facility] 

20:58 Data reviewed: vital signs, nurses notes, lab test result(s), EKG, radiologic studies,  [facility] 

      CT scan, plain films.                                                                       

                                                                                             

01:21 Differential diagnosis: DKA, hyperglycemia. Differential Diagnosis altered mental       mag 

      status, sepsis. Data interpreted: Cardiac monitor: rate is 100 beats/min, rhythm is         

      regular, Pulse oximetry: on room air is 85 %. Test interpretation: by ED physician or       

      midlevel provider: ECG, plain radiologic studies. Counseling: I had a detailed              

      discussion with the patient and/or guardian regarding: the historical points, exam          

      findings, and any diagnostic results supporting the discharge/admit diagnosis, lab          

      results, radiology results, the need for further work-up and treatment in the hospital.     

      ED course: pt less responsive, sats dropping , blood pressure low, decision to intubate     

      and place central line, dw meg.                                                             

                                                                                                  

                                                                                             

20:51 Order name: Basic Metabolic Panel                                                       [facility] 

                                                                                             

20:51 Order name: CBC with Diff; Complete Time: 23:15                                         [facility] 

                                                                                             

20:51 Order name: LFT's                                                                       [facility] 

                                                                                             

20:51 Order name: Magnesium                                                                   [facility] 

                                                                                             

20:51 Order name: NT PRO-BNP                                                                  [facility] 

                                                                                             

20:51 Order name: Troponin (emerg Dept Use Only); Complete Time: 23:15                        [facility] 

                                                                                             

20:51 Order name: Lipase; Complete Time: 23:15                                                [facility] 

                                                                                             

20:51 Order name: Blood Culture Adult (2)                                                     [facility] 

                                                                                             

20:51 Order name: Lactate; Complete Time: 23:15                                               [facility] 

                                                                                             

20:51 Order name: Procalcitonin; Complete Time: 00:01                                         [facility] 

                                                                                             

20:51 Order name: Acetaminophen; Complete Time: 23:15                                         [facility] 

                                                                                             

20:51 Order name: ETOH Level; Complete Time: 23:15                                            [facility] 

                                                                                             

20:51 Order name: PT-INR; Complete Time: 23:15                                                [facility] 

                                                                                             

20:51 Order name: Ptt, Activated; Complete Time: 23:15                                        [facility] 

                                                                                             

20:51 Order name: Salicylate; Complete Time: 23:15                                            [facility] 

                                                                                             

20:51 Order name: Urine Drug Screen; Complete Time: 06:51                                     [facility] 

                                                                                             

20:52 Order name: Basic Metabolic Panel; Complete Time: 23:15                                 [facility]

                                                                                             

20:52 Order name: Liver (Hepatic) Function; Complete Time: 23:15                              [facility]

                                                                                             

20:52 Order name: Magnesium; Complete Time: 23:15                                             [facility]

                                                                                             

20:52 Order name: NT PRO-BNP; Complete Time: 23:15                                            [facility]

                                                                                             

22:00 Order name: Manual Differential; Complete Time: 23:15                                   [facility]MS

                                                                                             

23:14 Order name: COVID-19                                                                    [facility] 

                                                                                             

00:05 Order name: CBC with Automated Diff; Complete Time: 06:51                               EDMS

                                                                                             

00:05 Order name: Comprehensive Metabolic Panel; Complete Time: 06:51                         [facility]

                                                                                             

00:05 Order name: Lactate; Complete Time: 06:51                                               EDMS

                                                                                             

00:05 Order name: Magnesium; Complete Time: 06:51                                             EDMS

                                                                                             

00:08 Order name: ABG; Complete Time: 06:51                                                   rr5 

                                                                                             

00:09 Order name: Vancomycin Level Trough; Complete Time: 06:51                               EDMS

                                                                                             

01:16 Order name: ABG                                                                           

                                                                                             

20:51 Order name: XRAY Chest (1 view)                                                         [facility] 

                                                                                             

20:51 Order name: CT Abd/Pelvis - IV Contrast Only                                            [facility] 

                                                                                             

01:16 Order name: XRAY CXR (1 view)                                                             

                                                                                             

01:49 Order name: Urine Dipstick--Ancillary (enter results)                                   [facility] 

                                                                                             

01:53 Order name: Lactate Sepsis 2 HR Follow-up; Complete Time: 06:51                         EDMS

                                                                                             

01:59 Order name: ABG Arterial Blood Gas; Complete Time: 06:51                                EDMS

                                                                                             

02:19 Order name: Urine Dipstick-Ancillary; Complete Time: 06:51                              EDMS

                                                                                             

02:30 Order name: Urine Drug Screen                                                                                                                                                        

02:30 Order name: Urine Microscopic Only                                                                                                                                                   

02:50 Order name: SARS-COV-2 RT PCR; Complete Time: 06:51                                     EDMS

                                                                                             

03:10 Order name: Glucose, Ancillary Testing; Complete Time: 06:51                            EDMS

                                                                                             

03:45 Order name: Urine Microscopic Only; Complete Time: 06:51                                EDMS

                                                                                             

04:30 Order name: Glucose, Ancillary Testing; Complete Time: 06:51                            EDMS

                                                                                             

20:51 Order name: EKG; Complete Time: 20:52                                                   mag 

                                                                                             

20:51 Order name: Cardiac monitoring; Complete Time: 02:07                                    mag 

                                                                                             

20:51 Order name: EKG - Nurse/Tech; Complete Time: 02:07                                      mag 

                                                                                             

20:51 Order name: IV Saline Lock; Complete Time: 02:07                                        mag 

                                                                                             

20:51 Order name: Labs collected and sent; Complete Time: 02:07                               mag 

                                                                                             

20:51 Order name: O2 Per Protocol; Complete Time: 02:07                                       mag 

                                                                                             

20:51 Order name: O2 Sat Monitoring; Complete Time: 02:07                                     mag 

                                                                                             

20:51 Order name: Urine Dipstick-Ancillary (obtain specimen); Complete Time: 02:07            mag 

                                                                                             

23:17 Order name: IV Saline Lock - Large Bore; Complete Time: 23:19                           mag 

                                                                                             

00:05 Order name: Consistent Carb (ADA) 1800 Davin                                              EDMS

                                                                                             

02:12 Order name: Amaro; Complete Time: 02:16                                                 rr5 

                                                                                             

02:12 Order name: Nasogastric Tube; Complete Time: 02:12                                      rr5 

                                                                                             

03:44 Order name: Restraint:Non-Violent; Complete Time: 03:44                                 rr5 

                                                                                                  

EC:38 Rate is 91 beats/min. Rhythm is regular. QRS Axis is Normal. [place] interval is normal. QRS mag 

      interval is normal. QT interval is normal. No Q waves. T waves are Normal. No ST            

      changes noted. Clinical impression: Normal ECG and No evidence of ischemia. Interpreted     

      by me. Reviewed by me.                                                                      

                                                                                                  

Administered Medications:                                                                         

      Discontinued: D5-1/2 NS 1000 ml IV at 125 ml/hr continuous                                  

                                                                                             

20:57 CANCELLED (Duplicate Order): Rocephin 1 grams IV at per protocol once; Given slow IV    mag 

      push per pharmacy instructions                                                              

21:24 Drug: NS 0.9% 500 ml Route: IV; Rate: bolus; Site: left hand;                           ll2 

22:33 Follow up: Response: No adverse reaction                                                ll2 

23:30 Follow up: Response: No adverse reaction; IV Status: Completed infusion; IV Intake:     rr5 

      500ml                                                                                       

21:25 Drug: Thiamine 100 mg Route: IV; Rate: bolus; Site: left wrist;                         ll2 

22:25 Follow up: Response: No adverse reaction; IV Status: Completed infusion                 rr5 

22:33 Follow up: Response: No adverse reaction                                                2 

21:25 Drug: NS 0.9% 1000 ml Route: IV; Rate: 1 bolus; Site: left hand;                        2 

                                                                                             

00:00 Follow up: Response: No adverse reaction; IV Status: Completed infusion; IV Intake:     rr5 

      1000ml                                                                                      

                                                                                             

21:25 Drug: D5-1/2 NS 1000 ml Route: IV; Rate: 125 ml/hr; Site: left hand;                    ll2 

22:33 Follow up: Response: No adverse reaction                                                ll2 

22:33 Follow up: Response: No adverse reaction                                                2 

                                                                                             

03:12 Follow up: IV Status: Order to discontinue infusion                                     5 

                                                                                             

22:00 Drug: Zosyn 3.375 grams Route: IVPB; Infused Over: 60 mins; Site: right upper arm;      ll2 

22:33 Follow up: Response: No adverse reaction                                                2 

                                                                                             

01:30 Follow up: Response: No adverse reaction; IV Status: Completed infusion; IV Intake:     rr5 

      100ml                                                                                       

                                                                                             

22:31 Drug: Ativan 1 mg Route: IVP; Site: left wrist;                                         ll2 

22:34 Follow up: Response: No adverse reaction; RASS: Alert and Calm (0)                      ll2 

22:49 Follow up: Response: Anxiety decreased                                                  ll2 

23:55 Follow up: Response: No adverse reaction                                                ll2 

22:49 Drug: vancoMYCIN 1 grams Route: IVPB; Infused Over: 2 hrs; Site: left wrist;            2 

                                                                                             

03:00 Follow up: Response: No adverse reaction; IV Status: Completed infusion; IV Intake:     rr5 

      250ml                                                                                       

00:55 Drug: Versed 4 mg Route: IVP; Site: right hand;                                         rr5 

01:00 Follow up: Response: No adverse reaction                                                ea  

00:56 Drug: Etomidate 10 mg Route: IVP; Site: right hand;                                     rr5 

01:00 Follow up: Response: No adverse reaction                                                ea  

01:25 Drug: NS 0.9% 1000 ml {Note: right femoral.} Route: IV; Rate: 1 bolus; Site: Other;     rr5 

02:43 Follow up: Response: No adverse reaction; IV Status: Completed infusion; IV Intake:     ea  

      1000ml                                                                                      

01:25 Drug: NS 0.9% 500 ml {Note: right femoral.} Route: IV; Rate: bolus; Site: Other;        rr5 

02:43 Follow up: IV Status: Completed infusion; IV Intake: 500ml                              ea  

01:50 Drug: Magnesium Sulfate 1 grams {Note: right femoral.} Route: IVPB; Infused Over: 1     rr5 

      hrs; Site: Other;                                                                           

02:50 Follow up: Response: No adverse reaction; IV Status: Completed infusion; IV Intake: [id number] 

02:00 Drug: Potassium Chloride 20 mEq Route: IV; Rate: per protocol; Site: Other;             rr5 

03:00 Follow up: Response: No adverse reaction; IV Status: Completed infusion; IV Intake:     rr5 

      100ml                                                                                       

02:04 Not Given (intubated): Potassium Effervescent Tablet 25 mEq PO once; dissolve in 4      rr5 

      ounces of water or juice                                                                    

02:30 Drug: Pepcid 20 mg {Note: right femoral.} Route: IVP; Site: Other;                      rr5 

03:55 Follow up: Response: No adverse reaction                                                rr5 

03:09 Not Given (Other Intervention Used; D5 1/2NS at 100 ml/hr ordered in [employer]): D5-NS   rr5 

      with KCL 20 mEq/L 1000 ml IV at 125 ml/hr continuous                                        

03:15 Drug: D50W 50 ml {Note: right femoral.} Route: IVP; Site: Other;                        rr5 

04:15 Follow up: Response: No adverse reaction; Blood sugar is elevated                       rr5 

03:15 Drug: Propofol 5 mcg/kg/min {Note: right femoral.} Route: IV; Rate: calculated rate;    rr5 

      Site: Other;                                                                                

03:40 Follow up: Rate change 10 mcg/kg/min                                                    rr5 

04:15 Follow up: Rate change 15 mcg/kg/min                                                    rr5 

07:10 Follow up: Response: No adverse reaction; IV Status: Infusion continued upon admission  rr5 

                                                                                                  

                                                                                                  

Disposition:                                                                                      

20 23:20 Hospitalization ordered by Mandeep Culp for Inpatient Admission. Preliminary     

  diagnosis are Hypoglycemia, unspecified, Type 1 diabetes mellitus,                              

  Hypomagnesemia, Hypokalemia, Pneumonia due to other specified bacteria -                        

  right upper and lower pneumonia, Hypoxemia, Alcohol abuse, Adverse effect of                    

  amphetamines, Abuse of non-psychoactive substances - METH, Neutropenia,                         

  Bandemia, Respiratory failure, unspecified with hypoxia, Severe sepsis with                     

  septic shock.                                                                                   

- Bed requested for Intensive Care Unit.                                                          

- Status is Inpatient Admission.                                                              sv  

- Condition is Serious.                                                                           

- Problem is new.                                                                                 

- Symptoms have improved.                                                                         

                                                                                                  

                                                                                                  

                                                                                                  

Signatures:                                                                                       

Dispatcher [employer]                           EDMS                                                 

Iona Valdez, DEEPALI BARRETO-Maricarmen Oliveira RN RN sv Webb, Martha, RN RN mw Anderson, Corey, MD MD cha Antunez, Elena RN                      Octavio Page ea RN                      RN   rr5                                                  

Ratna Yañez, RN                    RN   ll2                                                  

                                                                                                  

Corrections: (The following items were deleted from the chart)                                    

                                                                                             

20:57 20:51 Rocephin 1 grams IV at per protocol once; Given slow IV push per pharmacy         mag 

      instructions ordered. [facility]                                                                   

23:36 23:20 Hospitalization Ordered by Mandeep Culp MD for Inpatient Admission. Preliminary    

      diagnosis is Hypoglycemia, unspecified; Type 1 diabetes mellitus; Hypomagnesemia;           

      Hypokalemia; Pneumonia due to other specified bacteria - right upper and lower              

      pneumonia; Hypoxemia; Alcohol abuse; Adverse effect of amphetamines; Abuse of               

      non-psychoactive substances - METH; Neutropenia; Bandemia. Bed requested for                

      Telemetry/MedSurg (Inpatient). Status is Inpatient Admission. Condition is Serious.         

      Problem is new. Symptoms have improved. [facility]                                                 

                                                                                             

00:19 00:09 Vancomycin Peak ordered. [facility]                                                     EDMS

01:25  23:36 2020 23:20 Hospitalization Ordered by Mandeep Culp MD for Inpatient  [facility] 

      Admission. Preliminary diagnosis is Hypoglycemia, unspecified; Type 1 diabetes              

      mellitus; Hypomagnesemia; Hypokalemia; Pneumonia due to other specified bacteria -          

      right upper and lower pneumonia; Hypoxemia; Alcohol abuse; Adverse effect of                

      amphetamines; Abuse of non-psychoactive substances - METH; Neutropenia; Bandemia. Bed       

      requested for [facility] ER HOLD. Status is Inpatient Admission. Condition is Serious.            

      Problem is new. Symptoms have improved.                                                   

                                                                                             

06:14 01:25 2020 23:20 Hospitalization Ordered by Mandeep Culp MD for Inpatient          

      Admission. Preliminary diagnosis is Hypoglycemia, unspecified; Type 1 diabetes              

      mellitus; Hypomagnesemia; Hypokalemia; Pneumonia due to other specified bacteria -          

      right upper and lower pneumonia; Hypoxemia; Alcohol abuse; Adverse effect of                

      amphetamines; Abuse of non-psychoactive substances - METH; Neutropenia; Bandemia;           

      Respiratory failure, unspecified with hypoxia; Severe sepsis with septic shock. Bed         

      requested for [facility] ER HOLD. Status is Inpatient Admission. Condition is Serious.            

      Problem is new. Symptoms have improved. [facility]                                                 

07:38 06:14 2020 23:20 Hospitalization Ordered by Mandeep Culp MD for Inpatient          

      Admission. Preliminary diagnosis is Hypoglycemia, unspecified; Type 1 diabetes              

      mellitus; Hypomagnesemia; Hypokalemia; Pneumonia due to other specified bacteria -          

      right upper and lower pneumonia; Hypoxemia; Alcohol abuse; Adverse effect of                

      amphetamines; Abuse of non-psychoactive substances - METH; Neutropenia; Bandemia;           

      Respiratory failure, unspecified with hypoxia; Severe sepsis with septic shock. Bed         

      requested for Intensive Care Unit. Status is Inpatient Admission. Condition is Serious.     

      Problem is new. Symptoms have improved. mw                                                  

                                                                                                  

**************************************************************************************************

## 2020-08-29 LAB
ALBUMIN SERPL BCP-MCNC: 1.8 G/DL (ref 3.4–5)
ALP SERPL-CCNC: 298 U/L (ref 45–117)
ALT SERPL W P-5'-P-CCNC: 64 U/L (ref 12–78)
AST SERPL W P-5'-P-CCNC: 44 U/L (ref 15–37)
BUN BLD-MCNC: 12 MG/DL (ref 7–18)
CAOX CRY URNS QL MICRO: (no result)
COHGB MFR BLDA: 1.1 % (ref 0–1.5)
COHGB MFR BLDA: 1.5 % (ref 0–1.5)
GLUCOSE SERPLBLD-MCNC: 67 MG/DL (ref 74–106)
HCT VFR BLD CALC: 29.7 % (ref 39.6–49)
LYMPHOCYTES # SPEC AUTO: 0.3 K/UL (ref 0.7–4.9)
MAGNESIUM SERPL-MCNC: 1.8 MG/DL (ref 1.8–2.4)
METHAMPHET UR QL SCN: POSITIVE
OXYHGB MFR BLDA: 79 % (ref 94–97)
OXYHGB MFR BLDA: 96.5 % (ref 94–97)
PMV BLD: 7.6 FL (ref 7.6–11.3)
POTASSIUM SERPL-SCNC: 3.5 MMOL/L (ref 3.5–5.1)
RBC # BLD: 3.29 M/UL (ref 4.33–5.43)
SAO2 % BLDA: 80.9 % (ref 92–98.5)
SAO2 % BLDA: 98.4 % (ref 92–98.5)
THC SERPL-MCNC: POSITIVE NG/ML
TSH SERPL DL<=0.05 MIU/L-ACNC: 1.13 UIU/ML (ref 0.36–3.74)
UA COMPLETE W REFLEX CULTURE PNL UR: (no result)

## 2020-08-29 PROCEDURE — 0BH17EZ INSERTION OF ENDOTRACHEAL AIRWAY INTO TRACHEA, VIA NATURAL OR ARTIFICIAL OPENING: ICD-10-PCS

## 2020-08-29 PROCEDURE — 5A1945Z RESPIRATORY VENTILATION, 24-96 CONSECUTIVE HOURS: ICD-10-PCS

## 2020-08-29 RX ADMIN — DEXTROSE, SODIUM CHLORIDE, AND POTASSIUM CHLORIDE SCH MLS: 5; .45; .15 INJECTION INTRAVENOUS at 13:11

## 2020-08-29 RX ADMIN — DEXTROSE MONOHYDRATE PRN GM: 25 INJECTION, SOLUTION INTRAVENOUS at 22:54

## 2020-08-29 RX ADMIN — PIPERACILLIN SODIUM AND TAZOBACTAM SODIUM SCH MLS: 3; .375 INJECTION, POWDER, LYOPHILIZED, FOR SOLUTION INTRAVENOUS at 15:07

## 2020-08-29 RX ADMIN — DEXTROSE MONOHYDRATE SCH MLS: 100 INJECTION, SOLUTION INTRAVENOUS at 17:01

## 2020-08-29 RX ADMIN — HUMAN INSULIN SCH: 100 INJECTION, SOLUTION SUBCUTANEOUS at 07:30

## 2020-08-29 RX ADMIN — DEXTROSE MONOHYDRATE SCH MLS: 100 INJECTION, SOLUTION INTRAVENOUS at 23:31

## 2020-08-29 RX ADMIN — PIPERACILLIN SODIUM AND TAZOBACTAM SODIUM SCH MLS: 3; .375 INJECTION, POWDER, LYOPHILIZED, FOR SOLUTION INTRAVENOUS at 06:00

## 2020-08-29 RX ADMIN — Medication SCH ML: at 22:38

## 2020-08-29 RX ADMIN — HUMAN INSULIN SCH: 100 INJECTION, SOLUTION SUBCUTANEOUS at 16:30

## 2020-08-29 RX ADMIN — HYDROCORTISONE SODIUM SUCCINATE SCH MG: 100 INJECTION, POWDER, FOR SOLUTION INTRAMUSCULAR; INTRAVENOUS at 22:39

## 2020-08-29 RX ADMIN — ENOXAPARIN SODIUM SCH MG: 40 INJECTION SUBCUTANEOUS at 09:28

## 2020-08-29 RX ADMIN — DEXTROSE, SODIUM CHLORIDE, AND POTASSIUM CHLORIDE SCH MLS: 5; .45; .15 INJECTION INTRAVENOUS at 02:00

## 2020-08-29 RX ADMIN — SODIUM CHLORIDE SCH MLS: 9 INJECTION, SOLUTION INTRAVENOUS at 13:11

## 2020-08-29 RX ADMIN — DEXTROSE, SODIUM CHLORIDE, AND POTASSIUM CHLORIDE SCH MLS: 5; .45; .15 INJECTION INTRAVENOUS at 07:00

## 2020-08-29 RX ADMIN — HUMAN INSULIN SCH: 100 INJECTION, SOLUTION SUBCUTANEOUS at 21:00

## 2020-08-29 RX ADMIN — PIPERACILLIN SODIUM AND TAZOBACTAM SODIUM SCH MLS: 3; .375 INJECTION, POWDER, LYOPHILIZED, FOR SOLUTION INTRAVENOUS at 22:39

## 2020-08-29 RX ADMIN — HUMAN INSULIN SCH: 100 INJECTION, SOLUTION SUBCUTANEOUS at 11:30

## 2020-08-29 RX ADMIN — Medication SCH ML: at 09:29

## 2020-08-29 RX ADMIN — THIAMINE HYDROCHLORIDE SCH MG: 100 INJECTION, SOLUTION INTRAMUSCULAR; INTRAVENOUS at 12:26

## 2020-08-29 NOTE — P.CNS
Date of Consult: 08/29/20


Reason for Consult: Respiratory failure and pneumonia hypoglycemia


Chief Complaint: Altered mental status/hypoglycemia/sepsis/pneumonia/drugs


History of Present Illness: 


Patient is 49 years of age history of diabetes as and with altered mental status

hypoglycemia electrolyte abnormality and is an active smoker alcohol abuse drug 

abuse eased admitted with a right lung pneumonia eyes on a ventilator 

hypoglycemic he has an insulin pump currently on a ventilator


Allergies





No Known Allergies Allergy (Unverified 08/29/20 03:00)


   








- Past Medical/Surgical History


Diabetic: Yes


-: Type 2 diabetes mellitus diagnosed 2011


-: Drug abuse


-: Alcohol abuse


-: None


Psychosocial/ Personal History: Lives at home





- Social History


Alcohol use: Yes


CD- Drugs: Yes


Caffeine use: Yes


Place of Residence: Home





Review of Systems


is unable to be obtained





Physical Examination














Temp Pulse Resp BP Pulse Ox


 


 97.7 F   92 H  27 H  104/68   97 


 


 08/29/20 06:44  08/29/20 06:44  08/29/20 06:44  08/29/20 06:44  08/29/20 06:44








General: Cachectic, Unresponsive, Other


Neck: Supple


Respiratory: Crackles/rales (Crackles on the right side)


Cardiovascular: No edema, Normal S1 S2


Laboratory Data (last 24 hrs)





08/28/20 21:34: PT 14.0 H, INR 1.19, APTT 30.0


08/28/20 21:34: WBC 1.1 L*, Hgb 12.3 L, Hct 36.1 L, Plt Count 290


08/28/20 21:34: Sodium 137, Potassium 3.1 L, BUN 11, Creatinine 0.90, Glucose 

86, Magnesium 1.7 L, Total Bilirubin 0.5, AST 71 H,  H, Alkaline 

Phosphatase 538 H, Lipase 17 L








- Problems


(1) Respiratory failure


Current Visit: Yes   Status: Acute   


Plan: 


Patient is 49 years of age admitted with severe hypoglycemia he has an insulin 

pump that niece to be stopped in addition he has a right lung pneumonia alcohol 

drug abuse is also in shock anemic and neutropenic multiple electrolyte 

abnormalities patient has bilateral pneumonia right worse than the left most 

likely he has aspirated agree with Zosyn add vancomycin fluid bolus Dc insulin 

pump.  Sputum cultures titrate to sat 90 95% I have added serum cortisol level 

stat dose of hydrocortisone tsh T4 level


Qualifiers: 


   Chronicity: acute

## 2020-08-29 NOTE — RAD REPORT
EXAM DESCRIPTION:  RAD - Chest Single View - 8/29/2020 1:56 am

 

CLINICAL HISTORY:  POST ETT

 

COMPARISON:  August 28

 

TECHNIQUE:  AP portable chest image was obtained 8/29/2020 1:56 am .

 

FINDINGS:  Endotracheal tube is in place with the tip at the T4 level top of the aortic arch. This is
 approximately 3-4 cm above the kenna.

 

NG tube has been placed. Tip is not well visualized. This appears to be curled in the distal esophagu
s at the GE junction.

 

Dense airspace opacification in the right lung field is noted with more moderate consolidation in the
 left base. Heart and vasculature are normal. No measurable pleural effusion and no pneumothorax. No 
acute bony abnormality seen. No acute aortic findings suspected.

 

IMPRESSION:  Endotracheal tube in good position 3-4 cm above the kenna.

 

NG tube appears curled in the distal esophagus near the GE junction.

## 2020-08-29 NOTE — P.HP
Certification for Inpatient


With expected LOS: >2 Midnights


Patient will require the following post-hospital care: None


Practitioner: I am a practitioner with admitting privileges, knowledge of 

patient current condition, hospital course, and medical plan of care.


Services: Services provided to patient in accordance with Admission requirements

found in Title 42 Section 412.3 of the Code of Federal Regulations





Patient History


Date of Service: 08/29/20


Reason for admission: Altered mental status/hypoglycemia/sepsis/pneumonia/drugs


History of Present Illness: 





49-year-old male with past medical history of diabetes diagnosed in 2011 is 

brought by EMS to the emergency room with complaints of altered mental status 

and hypoglycemia.  In the emergency room patient is noted to have a low white 

cell count, hypokalemia, hypomagnesemia.  Has been smoking methamphetamine for 5

days.  Has been drinking alcohol of all kinds for 5 days or greater.  He is 

found with a blood glucose in the 40s and given an amp of glucose in the 

ambulance.  Patient also has a history of pancreatitis likely from alcohol 

abuse.





In the emergency room chest x-ray and chest CT shows likely bacterial pneumonia.

 Blood work shows a white cell count of 1.1 with bands of 12.  Elevated liver 

enzymes with negative salicylates and negative acetamenophen.  His lactic acid 

is 5.9.





Patient was bolused IV fluids is started on IV antibiotics.  He improved.  Was 

found with blood glucose in the 40s.  Last check blood glucose was 86.  Patient 

is not a good source of information.  Patient will be admitted to the ICU and 

further evaluated. 


Home medications list reviewed: No





- Past Medical/Surgical History


Diabetic: Yes


-: Type 2 diabetes mellitus diagnosed 2011


-: Drug abuse


-: Alcohol abuse


-: None


Psychosocial/ Personal History: Lives at home





- Family History


Family History: Reviewed- Non-Contributory





- Social History


Smoking Status: Never smoker


Alcohol use: Yes


CD- Drugs: Yes


Caffeine use: Yes


Place of Residence: Home





Review of Systems


General: As per HPI


Eyes: As per HPI


ENT: Unremarkable


Respiratory: Unremarkable


Cardiovascular: Unremarkable


Gastrointestinal: Unremarkable


Genitourinary: Unremarkable


Musculoskeletal: Unremarkable


Neurological: Weakness, As per HPI


Lymphatics: Unremarkable





Physical Examination





- Vital Signs


Temperature: 99.3 F


Blood Pressure: 119/77


Pulse: 104


Respirations: 16


Pulse Ox (%): 95 (5L NC)





- Physical Exam


General: Alert, Oriented x1, Disheveled, Mild distress


HEENT: Atraumatic, Normocephalic, PERRLA, Mucous membr. moist/pink


Neck: Supple, No Thyromegaly, Other (Trachea midline)


Respiratory: Clear to auscultation bilaterally, Diminished


Cardiovascular: No edema, Normal pulses


Capillary refill: <2 Seconds


Gastrointestinal: Normal bowel sounds, Soft and benign, Non-distended


Musculoskeletal: No swelling, No contractures, No erythema


Integumentary: No significant lesion, No tenderness/swelling, No erythema


Neurological: Normal speech, Normal tone





- Studies


Laboratory Data (last 24 hrs)





08/28/20 21:34: PT 14.0 H, INR 1.19, APTT 30.0


08/28/20 21:34: WBC 1.1 L*, Hgb 12.3 L, Hct 36.1 L, Plt Count 290


08/28/20 21:34: Sodium 137, Potassium 3.1 L, BUN 11, Creatinine 0.90, Glucose 

86, Magnesium 1.7 L, Total Bilirubin 0.5, AST 71 H,  H, Alkaline 

Phosphatase 538 H, Lipase 17 L








Assessment and Plan





- Plan





Impression:


Altered mental status likely secondary to methamphetamine use and alcohol use:


Bacterial pneumonia:


Sepsis with bandemia:


Neutropenia:


Type 2 diabetes mellitus complicated by hypoglycemia:


Methamphetamine abuse:


Alcohol abuse:





Plan:


Altered mental status likely secondary to methamphetamine use and alcohol use:  

Altered mental status likely a combination of alcohol and drug abuse.  Will 

start patient on CIWA a protocol.  Continue D5 1/2 NS IV fluids.  Continuous 

telemetry.  Monitor.


Bacterial pneumonia:  Chest x-ray showing a pneumonia of the right upper and 

lower lobes.  Will start IV vancomycin and IV Zosyn due to significant 

neutropenia, with bands of 12 noted.


Sepsis with bandemia:  Likely from pneumonia and complicated by a history of 

alcohol and drug abuse.  Continue as above.  Monitor vitals.


Neutropenia:  Etiology unclear.  Likely from drug abuse.  Will monitor labs.


Type 2 diabetes mellitus complicated by hypoglycemia:


Methamphetamine abuse:  Will monitor patient for withdrawals.  Continue IV 

fluids as above.


Alcohol abuse:  Emergency room blood work noted elevated liver enzymes.  Will 

start CIWA protocol.  Monitor.





Discharge Plan: Home


Plan to discharge in: Greater than 2 days





- Advance Directives


Does patient have a Living Will: No


Does patient have a Durable POA for Healthcare: No





- Code Status/Comfort Care


Code Status Assessed: Yes


Time Spent Managing Pts Care (In Minutes): 55

## 2020-08-29 NOTE — EKG
Test Date:    2020-08-29               Test Time:    02:21:36

Technician:   SHADY                                     

                                                     

MEASUREMENT RESULTS:                                       

Intervals:                                           

Rate:         91                                     

TX:           142                                    

QRSD:         86                                     

QT:           388                                    

QTc:          477                                    

Axis:                                                

P:            71                                     

TX:           142                                    

QRS:          91                                     

T:            55                                     

                                                     

INTERPRETIVE STATEMENTS:                                       

                                                     

Normal sinus rhythm

Rightward axis

Borderline ECG

No previous ECG available for comparison



Electronically Signed On 08-29-20 05:45:03 CDT by Dk Wellington

## 2020-08-29 NOTE — RAD REPORT
EXAM DESCRIPTION:  RAD - Chest Single View - 8/28/2020 9:32 pm

 

CLINICAL HISTORY:  PAIN, found unresponsive, history of drug use

 

COMPARISON:  None

 

TECHNIQUE:  AP portable chest image was obtained 8/28/2020 9:32 pm .

 

FINDINGS:  Consolidated parenchyma is present in the right midlung field and medial right lung base. 
More mild airspace opacification is present in the left lung base. Small nodular focus 10 mm in size 
superimposed on the posterior left eighth rib is probably infiltrate rather than nodule.

 

Findings could represent bacterial pneumonia or aspiration pneumonia given the provided history.

 

 Heart and vasculature are normal. No measurable pleural effusion and no pneumothorax. No acute bony 
abnormality seen. No acute aortic findings suspected.

 

IMPRESSION:  Dense airspace opacification in the mid right lung field and medial right base with more
 moderate airspace opacification left base.

 

Given the history, findings could be infectious pneumonia or aspiration pneumonia. Pattern is not a t
ypical COVID-19 pneumonia presentation.

## 2020-08-29 NOTE — P.PN
Subjective


Date of Service: 08/29/20


Primary Care Provider: unknown


Chief Complaint: Altered mental status/hypoglycemia/sepsis/pneumonia/drugs


Subjective: Other (Patient remains intubated.)





Physical Examination





- Vital Signs


Temperature: 97.7 F


Blood Pressure: 104/68


Pulse: 92


Respirations: 27


Pulse Ox (%): 97





- Physical Exam


General: Other (Patient intubated and slightly sedated.)


Neck: Supple


Respiratory: Crackles/rales (Bilateral)


Cardiovascular: Normal pulses, Regular rate/rhythm


Gastrointestinal: Normal bowel sounds


Neurological: Normal strength at 5/5 x4 extr, Other (Patient intubated and 

sedated)





- Studies


Laboratory Data (last 24 hrs)





08/28/20 21:34: PT 14.0 H, INR 1.19, APTT 30.0


08/28/20 21:34: WBC 1.1 L*, Hgb 12.3 L, Hct 36.1 L, Plt Count 290


08/28/20 21:34: Sodium 137, Potassium 3.1 L, BUN 11, Creatinine 0.90, Glucose 

86, Magnesium 1.7 L, Total Bilirubin 0.5, AST 71 H,  H, Alkaline 

Phosphatase 538 H, Lipase 17 L





Medications List Reviewed: Yes





Assessment & Plan


Discharge Plan: Home


Plan to discharge in: Greater than 2 days


Physician Review Additional Text: 





Impression:


Altered mental status secondary to acute toxic encephalopathy with severe sepsis

likely related to acute respiratory failure/right upper lobe aspiration 

pneumonia with possible UTI


Neutropenia and anemia chronic disease likely related to above


Hypoglycemia with history of type 1 diabetes with insulin pump


Drug abuse history with noted positive drug screen for amphetamines, 

benzodiazepine and THC


Elevated liver function likely related to above





Plan:


Altered mental status secondary to acute toxic encephalopathy with severe sepsis

likely related to acute respiratory failure/right upper lobe aspiration 

pneumonia with possible UTI:  Patient remains intubated.  Continue IV Zosyn and 

vancomycin.  Patient with hypoglycemia.  Patient noted to have insulin pump.  

This will be turned off.  Continue to monitor this closely.  Blood, urine 

cultures obtained.  Will discuss further with pulmonology.  Wean off ventilator.

 DVT prophylaxis in place.  Will recheck chest x-ray tomorrow.  Maintain oxygen 

saturations above 93%.  Fluid bolus given to maintain map of 65. Will order 

echocardiogram.   Will continue to reassess and monitor.


Neutropenia and anemia chronic disease likely related to above:  Will monitor 

this closely.  Maintain hemoglobin above 8.0.


Hypoglycemia with history of type 1 diabetes with insulin pump:  Insulin pump 

will be turned off.  Will monitor for hypoglycemia.  Will adjust IV fluids 

accordingly.


Drug abuse history with noted positive drug screen for amphetamines, benzodiaze

pine and THC:  Will need to obtain more history from family.  Will need to 

monitor for withdrawal..


Elevated liver function likely related to above:  Monitor liver function test 

closely.  Will obtain hepatitis and HIV panel.  Will consider liver ultrasound.


Time Spent Managing Pts Care (In Minutes): 55

## 2020-08-29 NOTE — RAD REPORT
EXAM DESCRIPTION:  CT - Abdomen   Pelvis W Contrast - 8/29/2020 5:00 am

 

CLINICAL HISTORY:  The patient is 49 years old and is Male; ABD PAIN

 

TECHNIQUE:  Axial computed tomography images of the abdomen and pelvis with intravenous contrast.   S
agittal and coronal reformatted images were created and reviewed.   This CT exam was performed using 
one or more of the following dose reduction techniques:   automated exposure control, adjustment of t
he mA and/or kV according to patient size, and/or use of iterative reconstruction technique.

 

COMPARISON:  No relevant prior studies available.

 

FINDINGS:  LUNG BASES:   Extensive consolidation and groundglass opacities within the visualized lung
 bases is noted, right greater than left.

ABDOMEN:

   LIVER:   Unremarkable.   No mass.

   GALLBLADDER AND BILE DUCTS:   No calcified stones.   No ductal dilation.

   PANCREAS:   Coarse calcifications are present throughout the pancreatic parenchyma. The pancreas i
s atrophic.

   SPLEEN:   Unremarkable.

   ADRENALS:   Unremarkable.   No mass.

   KIDNEYS AND URETERS:   Punctate bilateral intrarenal calcifications are present. A few peripheral 
areas of wedge-shaped low-attenuation are noted within both kidneys. However, there is no significant
 surrounding inflammation. The kidneys otherwise enhance symmetrically.

   STOMACH AND BOWEL:   The stomach is distended with fluid and air. The small bowel is decompressed 
proximally. Several fluid-filled slightly prominent distal small bowel loops are noted. A moderate am
ount stool is present throughout colon. There is no evidence of obstruction.

PELVIS:

   APPENDIX:   No findings to suggest acute appendicitis.

   BLADDER:   The bladder is significantly distended.

   REPRODUCTIVE:   Unremarkable as visualized.

ABDOMEN and PELVIS:

   INTRAPERITONEAL SPACE:   Unremarkable.   No free air.   No significant fluid collection.

   BONES/JOINTS:   No acute fracture.

   SOFT TISSUES:   Wasting of the musculature of the abdomen and pelvis is noted.

   VASCULATURE:   Atherosclerosis of the vasculature is present.   No abdominal aortic aneurysm.

   LYMPH NODES:   Unremarkable. No enlarged lymph nodes.

 

IMPRESSION:  1.   Extensive consolidation and groundglass opacity throughout the visualized lung base
s, right greater left. Findings are most suggestive of infectious process.

2.   Few prominent fluid-filled small bowel loops scattered throughout the abdomen. Findings are nons
pecific and could be secondary to mild enteritis. There is no bowel obstruction.

3.   Few subtle peripheral wedge-shaped areas of low attenuation are noted within both kidneys. Findi
ngs may be secondary to subtle areas of scarring. There is no significant surrounding inflammation.

 

Electronically signed by:   Carlotta Guerra MD   8/28/2020 11:43 PM CDT Workstation: [id number]

 

 

Due to temporary technical issues with the PACS/Fluency reporting system, reports are being signed by
 the in house radiologist without review as a courtesy to ensure prompt reporting. The interpreting r
adiologist is fully responsible for the content of the report.

## 2020-08-29 NOTE — P.INFCA
Sepsis Focused Assessment





- Focused Assessment Complete?


Sepsis Focused Assessment Completed?: Yes





- Sepsis Screen Result


Severe Sepsis: Positive


Septic Shock: Negative





- Evaluation


Current stage of sepsis: Severe sepsis





- Vital Signs


Reviewed: Yes


Temperature: 95.8 F


Heart rate: 80


Blood Pressure: 122/62


Respiratory Rate: 28


O2 Sat by Pulse Oximetry: 100





- Examination


Date exam was performed: 08/29/20


Time exam was performed: 06:17


Heart: Regular rate/rhythm


Lungs: Crackles


Peripheral pulses: 3+ Normal


Peripheral pulse location: Pedal


Capillary refill: <2 Seconds


Skin examination: Normal turgor (Patient stable. Continue with IV Fluids. 

Patient is intubated)

## 2020-08-30 LAB
ALBUMIN SERPL BCP-MCNC: 1.5 G/DL (ref 3.4–5)
ALP SERPL-CCNC: 205 U/L (ref 45–117)
ALT SERPL W P-5'-P-CCNC: 46 U/L (ref 12–78)
AST SERPL W P-5'-P-CCNC: 30 U/L (ref 15–37)
BUN BLD-MCNC: 13 MG/DL (ref 7–18)
GLUCOSE SERPLBLD-MCNC: 108 MG/DL (ref 74–106)
HCT VFR BLD CALC: 25.8 % (ref 39.6–49)
LYMPHOCYTES # SPEC AUTO: 0.3 K/UL (ref 0.7–4.9)
MAGNESIUM SERPL-MCNC: 1.6 MG/DL (ref 1.8–2.4)
MORPHOLOGY BLD-IMP: (no result)
PMV BLD: 7.8 FL (ref 7.6–11.3)
POTASSIUM SERPL-SCNC: 3.4 MMOL/L (ref 3.5–5.1)
RBC # BLD: 2.86 M/UL (ref 4.33–5.43)

## 2020-08-30 RX ADMIN — HUMAN INSULIN SCH: 100 INJECTION, SOLUTION SUBCUTANEOUS at 16:30

## 2020-08-30 RX ADMIN — Medication SCH ML: at 09:10

## 2020-08-30 RX ADMIN — HUMAN INSULIN SCH: 100 INJECTION, SOLUTION SUBCUTANEOUS at 11:30

## 2020-08-30 RX ADMIN — HUMAN INSULIN SCH: 100 INJECTION, SOLUTION SUBCUTANEOUS at 21:00

## 2020-08-30 RX ADMIN — DEXTROSE MONOHYDRATE SCH MLS: 100 INJECTION, SOLUTION INTRAVENOUS at 04:49

## 2020-08-30 RX ADMIN — THIAMINE HYDROCHLORIDE SCH MG: 100 INJECTION, SOLUTION INTRAMUSCULAR; INTRAVENOUS at 09:09

## 2020-08-30 RX ADMIN — SODIUM CHLORIDE SCH MLS: 0.9 INJECTION, SOLUTION INTRAVENOUS at 13:19

## 2020-08-30 RX ADMIN — PIPERACILLIN SODIUM AND TAZOBACTAM SODIUM SCH MLS: 3; .375 INJECTION, POWDER, LYOPHILIZED, FOR SOLUTION INTRAVENOUS at 14:13

## 2020-08-30 RX ADMIN — HUMAN INSULIN SCH: 100 INJECTION, SOLUTION SUBCUTANEOUS at 07:30

## 2020-08-30 RX ADMIN — DEXTROSE MONOHYDRATE SCH MLS: 100 INJECTION, SOLUTION INTRAVENOUS at 09:09

## 2020-08-30 RX ADMIN — PIPERACILLIN SODIUM AND TAZOBACTAM SODIUM SCH MLS: 3; .375 INJECTION, POWDER, LYOPHILIZED, FOR SOLUTION INTRAVENOUS at 05:37

## 2020-08-30 RX ADMIN — HYDROCORTISONE SODIUM SUCCINATE SCH MG: 100 INJECTION, POWDER, FOR SOLUTION INTRAMUSCULAR; INTRAVENOUS at 21:11

## 2020-08-30 RX ADMIN — HYDROCORTISONE SODIUM SUCCINATE SCH MG: 100 INJECTION, POWDER, FOR SOLUTION INTRAMUSCULAR; INTRAVENOUS at 09:10

## 2020-08-30 RX ADMIN — PIPERACILLIN SODIUM AND TAZOBACTAM SODIUM SCH MLS: 3; .375 INJECTION, POWDER, LYOPHILIZED, FOR SOLUTION INTRAVENOUS at 21:12

## 2020-08-30 RX ADMIN — SODIUM CHLORIDE SCH MLS: 0.9 INJECTION, SOLUTION INTRAVENOUS at 01:30

## 2020-08-30 RX ADMIN — SODIUM CHLORIDE SCH MLS: 9 INJECTION, SOLUTION INTRAVENOUS at 09:09

## 2020-08-30 RX ADMIN — Medication SCH ML: at 21:11

## 2020-08-30 RX ADMIN — DEXTROSE MONOHYDRATE SCH MLS: 100 INJECTION, SOLUTION INTRAVENOUS at 14:13

## 2020-08-30 RX ADMIN — ENOXAPARIN SODIUM SCH MG: 40 INJECTION SUBCUTANEOUS at 09:09

## 2020-08-30 RX ADMIN — DEXTROSE MONOHYDRATE PRN GM: 25 INJECTION, SOLUTION INTRAVENOUS at 03:00

## 2020-08-30 RX ADMIN — DEXTROSE, SODIUM CHLORIDE, AND POTASSIUM CHLORIDE SCH: 5; .45; .15 INJECTION INTRAVENOUS at 05:45

## 2020-08-30 NOTE — RAD REPORT
EXAM DESCRIPTION:  RAD - Chest Single View - 8/30/2020 6:06 am

 

CLINICAL HISTORY:  Pneumonia, intubation

 

COMPARISON:  August 29

 

TECHNIQUE:  AP portable chest image was obtained 8/30/2020 6:06 am .

 

FINDINGS:  ET tube remains in good position. NG tube extends below the diaphragm, off the field of vi
ew.

 

Dense consolidation of the right lung field remains. Right lung field does appear slightly better aer
ated than on the prior day study. The interval changes minimal. Left lung base is fractionally improv
ed. No progressive process identifiable.

 

 Heart and vasculature are normal. No measurable pleural effusion and no pneumothorax. No acute bony 
abnormality seen. No acute aortic findings suspected.

 

IMPRESSION:  Partial clearing of bilateral lung field consolidation. Interval change is minimal.

## 2020-08-30 NOTE — P.PN
Subjective


Date of Service: 09/07/20


Primary Care Provider: unknown


Chief Complaint: Respiratory failure pneumonia


Subjective: Improving (Patient is improving he did well on spontaneous breathing

trial imminent extubated this after known he was alert oriented responsive c

ooperative patient is not on an insulin pump he admits to drinking and drug 

abuse)





Review of Systems


General: Weakness


Respiratory: Cough, Shortness of Breath





Physical Examination





- Vital Signs


Temperature: 98.1 F


Blood Pressure: 112/72


Pulse: 77


Respirations: 18


Pulse Ox (%): 97





- Physical Exam


General: Alert, In no apparent distress, Oriented x3


Respiratory: Crackles/rales (Crackles on the right side)


Cardiovascular: No edema, Regular rate/rhythm





- Studies


Medications List Reviewed: Yes





Assessment & Plan





- Problems (Diagnosis)


(1) Pneumonia


Current Visit: Yes   Status: Acute   


Plan: 


Patient is 49 years of age admitted with severe sepsis respiratory failure he is

doing much better extubated cultures are negative hypoglycemia appears to have 

resolved would wean him off the D 10 he is not on an insulin pump his white 

count is now up to normal continue with hydrocortisone of reduce the dose sputum

cultures are pending


Qualifiers: 


   Pneumonia type: due to unspecified organism   Laterality: bilateral 


Physician Review Additional Text: 





I

## 2020-08-30 NOTE — P.PN
Subjective


Date of Service: 08/30/20


Primary Care Provider: unknown


Chief Complaint: Altered mental status/hypoglycemia/sepsis/pneumonia/drugs


Subjective: Doing well





Physical Examination





- Vital Signs


Temperature: 99.4 F


Blood Pressure: 100/59


Pulse: 74


Respirations: 21


Pulse Ox (%): 94





- Physical Exam


General: Alert, Cooperative, Other (Patient is alert but still intubated)


Neck: Supple


Respiratory: Crackles/rales (Less crackles to the bases)


Cardiovascular: Normal pulses, Regular rate/rhythm


Gastrointestinal: Normal bowel sounds, No masses, No rebound, No guarding


Integumentary: No erythema, No warmth, No cyanosis


Neurological: Normal strength at 5/5 x4 extr, Normal affect





- Studies


Medications List Reviewed: Yes





Assessment & Plan


Discharge Plan: Home


Plan to discharge in: Greater than 2 days


Physician Review Additional Text: 





Impression:


Altered mental status secondary to acute toxic encephalopathy with severe sepsis

likely related to acute respiratory failure/right upper lobe aspiration 

pneumonia with possible UTI


Neutropenia and anemia chronic disease likely related to above


Hypoglycemia with history of type 1 diabetes with insulin pump


Drug abuse history with noted positive drug screen for amphetamines, 

benzodiazepine and THC


Elevated liver function likely related to above


Suspect possible adrenal insufficiency


Severe protein malnutrition with hypocalcemia, hypomagnesia





Plan:


Altered mental status secondary to acute toxic encephalopathy with severe sepsis

likely related to acute respiratory failure/right upper lobe aspiration 

pneumonia with possible UTI:  Patient remains intubated but alert.  Continue IV 

antibiotic therapy.  Await culture results.  Patient had hypoglycemia yesterday.

 Insulin pump discontinued.  This may be related to severe sepsis versus adrenal

insufficiency per versus other etiology.  Patient on hydrocortisone.  This was 

increased yesterday.  Patient also on D10 fluids.  Patient also on tube feeds.  

Blood sugar better controlled.  Will discuss further with pulmonology.  

Anticipate possible extubation today.  Echo pending.  Continue with pulmonology 

recommendations.


Neutropenia and anemia chronic disease likely related to above:  This has 

improved with antibiotic treatment.  Will continue monitor closely.  Maintain 

hemoglobin above 8.0..


Hypoglycemia with history of type 1 diabetes with insulin pump:  Insulin pump 

discontinued.  Patient getting hydrocortisone for possible underlying adrenal 

insufficiency.  Hypoglycemia may be related to severe sepsis as well.  Continue 

monitor this closely.  Insulin level obtained.


Drug abuse history with noted positive drug screen for amphetamines, be

nzodiazepine and THC:  Mother reports patient still abuses medication.  Will 

monitor for withdrawal.


Elevated liver function likely related to above:  Monitor liver function test 

closely.  Will obtain hepatitis and HIV panel.  Will consider liver ultrasound.


Suspect possible adrenal insufficiency:  Order ACTH test tomorrow.  Will discuss

further with pulmonology.


Severe protein malnutrition with hypocalcemia and hypomagnesia:  Continue with 

above plan of care.


Time Spent Managing Pts Care (In Minutes): 55

## 2020-08-31 LAB
ALBUMIN SERPL BCP-MCNC: 1.7 G/DL (ref 3.4–5)
ALP SERPL-CCNC: 176 U/L (ref 45–117)
ALT SERPL W P-5'-P-CCNC: 43 U/L (ref 12–78)
AST SERPL W P-5'-P-CCNC: 22 U/L (ref 15–37)
BUN BLD-MCNC: 18 MG/DL (ref 7–18)
GLUCOSE SERPLBLD-MCNC: 50 MG/DL (ref 74–106)
HCT VFR BLD CALC: 28.7 % (ref 39.6–49)
LYMPHOCYTES # SPEC AUTO: 0.3 K/UL (ref 0.7–4.9)
MAGNESIUM SERPL-MCNC: 2 MG/DL (ref 1.8–2.4)
PMV BLD: 8.2 FL (ref 7.6–11.3)
POTASSIUM SERPL-SCNC: 3.1 MMOL/L (ref 3.5–5.1)
RBC # BLD: 3.21 M/UL (ref 4.33–5.43)

## 2020-08-31 RX ADMIN — SODIUM CHLORIDE SCH MLS: 0.9 INJECTION, SOLUTION INTRAVENOUS at 01:33

## 2020-08-31 RX ADMIN — HUMAN INSULIN SCH UNIT: 100 INJECTION, SOLUTION SUBCUTANEOUS at 21:00

## 2020-08-31 RX ADMIN — ENOXAPARIN SODIUM SCH MG: 40 INJECTION SUBCUTANEOUS at 14:54

## 2020-08-31 RX ADMIN — THIAMINE HYDROCHLORIDE SCH MG: 100 INJECTION, SOLUTION INTRAMUSCULAR; INTRAVENOUS at 09:00

## 2020-08-31 RX ADMIN — PIPERACILLIN SODIUM AND TAZOBACTAM SODIUM SCH MLS: 3; .375 INJECTION, POWDER, LYOPHILIZED, FOR SOLUTION INTRAVENOUS at 05:12

## 2020-08-31 RX ADMIN — HUMAN INSULIN SCH: 100 INJECTION, SOLUTION SUBCUTANEOUS at 11:30

## 2020-08-31 RX ADMIN — Medication SCH ML: at 09:00

## 2020-08-31 RX ADMIN — POTASSIUM CHLORIDE SCH MLS: 200 INJECTION, SOLUTION INTRAVENOUS at 06:25

## 2020-08-31 RX ADMIN — PIPERACILLIN SODIUM AND TAZOBACTAM SODIUM SCH MLS: 3; .375 INJECTION, POWDER, LYOPHILIZED, FOR SOLUTION INTRAVENOUS at 23:43

## 2020-08-31 RX ADMIN — HYDROCORTISONE SODIUM SUCCINATE SCH: 100 INJECTION, POWDER, FOR SOLUTION INTRAMUSCULAR; INTRAVENOUS at 09:00

## 2020-08-31 RX ADMIN — POTASSIUM CHLORIDE SCH MLS: 200 INJECTION, SOLUTION INTRAVENOUS at 07:52

## 2020-08-31 RX ADMIN — Medication SCH ML: at 21:00

## 2020-08-31 RX ADMIN — SODIUM CHLORIDE SCH MLS: 9 INJECTION, SOLUTION INTRAVENOUS at 09:16

## 2020-08-31 RX ADMIN — HUMAN INSULIN SCH UNIT: 100 INJECTION, SOLUTION SUBCUTANEOUS at 16:52

## 2020-08-31 RX ADMIN — HUMAN INSULIN SCH: 100 INJECTION, SOLUTION SUBCUTANEOUS at 07:30

## 2020-08-31 RX ADMIN — PIPERACILLIN SODIUM AND TAZOBACTAM SODIUM SCH MLS: 3; .375 INJECTION, POWDER, LYOPHILIZED, FOR SOLUTION INTRAVENOUS at 14:54

## 2020-08-31 NOTE — P.PN
Subjective


Date of Service: 08/31/20


Primary Care Provider: unknown


Chief Complaint: Aspiration pneumonia


Subjective: Improving (Patient is doing well over yesterday while trying to eat 

he apparently choked on aspirated seen by speech therapy hypoglycemia is 

corrected)





Review of Systems


General: Weakness


Respiratory: Shortness of Breath





Physical Examination





- Vital Signs


Temperature: 97.7 F


Blood Pressure: 110/74


Pulse: 72


Respirations: 21


Pulse Ox (%): 100





- Physical Exam


General: Alert, Oriented x3


Respiratory: Clear to auscultation bilaterally, Crackles/rales (Actos on the 

right side)


Cardiovascular: No edema, Regular rate/rhythm





- Studies


Medications List Reviewed: Yes





Assessment & Plan





- Problems (Diagnosis)


(1) Pneumonia


Current Visit: Yes   Status: Acute   


Plan: 


Patient admitted with aspiration pneumonia is clinically doing much better Dc 

steroids insulin level in C-peptide is still pendingACTH level done his level 

did not increase that marked we the ACTH patient is on hydrocortisone will stop 

steroids Dc vancomycin cultures all negative sputum shows Gram-negative rods id 

id pending chemistries reviewed titrate sat to 90-95% seen by speech therapy 

taken diet recommended no aspiration noted


Qualifiers: 


   Pneumonia type: due to unspecified organism   Laterality: bilateral

## 2020-08-31 NOTE — P.PN
Subjective


Date of Service: 08/31/20


Primary Care Provider: unknown


Chief Complaint: Aspiration pneumonia


Subjective: Improving





Physical Examination





- Vital Signs


Temperature: 97.7 F


Blood Pressure: 110/74


Pulse: 72


Respirations: 21


Pulse Ox (%): 100





- Physical Exam


General: Alert


HEENT: Atraumatic


Neck: Supple


Respiratory: Clear to auscultation bilaterally, Normal air movement


Cardiovascular: Normal pulses, Regular rate/rhythm


Neurological: Normal speech, Normal strength at 5/5 x4 extr, Normal tone, Normal

affect





- Studies


Medications List Reviewed: Yes





Assessment & Plan


Discharge Plan: Other (SNF)


Plan to discharge in: 72 Hours


Physician Review Additional Text: 





Impression:


Altered mental status secondary to acute toxic encephalopathy with severe sepsis

likely related to acute respiratory failure/right upper lobe aspiration 

pneumonia with possible UTI


Neutropenia and anemia chronic disease likely related to above


Hypoglycemia with history of type 1 diabetes


Drug abuse history with noted positive drug screen for amphetamines, 

benzodiazepine and THC


Elevated liver function likely related to above


Suspect possible adrenal insufficiency


Severe protein malnutrition with hypocalcemia, hypomagnesia





Plan:


Altered mental status secondary to acute toxic encephalopathy with severe sepsis

likely related to acute respiratory failure/right upper lobe aspiration 

pneumonia with possible UTI:  Patient was extubated yesterday.  Speech therapy 

evaluated patient for swallowing.  No aspiration noted Patient may have 

mechanical soft.  Will adjust oral intake.  Patient off D 10. Medications and IV

fluids adjusted by pulmonology.  Continue to wean off oxygen.  What physical 

therapy ambulate.  Anticipate need for skilled placement.  Will discuss with 

social work and family. 


Neutropenia and anemia chronic disease likely related to above:  This has 

improved with antibiotic treatment.  Will continue monitor closely.  Maintain 

hemoglobin above 8.0..


Hypoglycemia with history of type 1 diabetes:   It was originally thought 

insulin pump was stop.  This was not at insulin pump this was to monitor blood 

sugar.  Blood sugar improved.  Continue with above plan of care.  Advance diet.


Drug abuse history with noted positive drug screen for amphetamines, 

benzodiazepine and THC:  Mother reports patient still abuses medication.  Will 

monitor for withdrawal.


Elevated liver function likely related to above:  Monitor liver function test 

closely.  Will obtain hepatitis and HIV panel.  Will consider liver ultrasound.


Suspect possible adrenal insufficiency:  No indication of adrenal insufficiency.

 Steroids to be stopped..


Severe protein malnutrition with hypocalcemia and hypomagnesia:  Continue with 

above plan of care.


Time Spent Managing Pts Care (In Minutes): 55

## 2020-08-31 NOTE — RAD REPORT
EXAM DESCRIPTION:

RAD - Barium Swallow Modified - 8/31/2020 10:40 am

 

CLINICAL HISTORY:  Dysphagia/respiratory complications/pneumonia

 

FINDINGS:  Sixteen fluoroscopic spot images obtained. Fluoroscopy time 3.5 minutes

 

Supraglottic penetration cleared with nectar and honey thick

 

No aspiration

 

pharyngeal residue: pyriform mild with thin,, posterior wall min with puree

 

flu

## 2020-09-01 LAB
ALBUMIN SERPL BCP-MCNC: 1.5 G/DL (ref 3.4–5)
ALP SERPL-CCNC: 148 U/L (ref 45–117)
ALP SERPL-CCNC: 150 U/L (ref 45–117)
ALT SERPL W P-5'-P-CCNC: 34 U/L (ref 12–78)
AST SERPL W P-5'-P-CCNC: 14 U/L (ref 15–37)
BUN BLD-MCNC: 32 MG/DL (ref 7–18)
GLUCOSE SERPLBLD-MCNC: 78 MG/DL (ref 74–106)
HCT VFR BLD CALC: 25.7 % (ref 39.6–49)
LYMPHOCYTES # SPEC AUTO: 0.7 K/UL (ref 0.7–4.9)
MAGNESIUM SERPL-MCNC: 2 MG/DL (ref 1.8–2.4)
MAGNESIUM SERPL-MCNC: 2 MG/DL (ref 1.8–2.4)
PMV BLD: 7.9 FL (ref 7.6–11.3)
POTASSIUM SERPL-SCNC: 2.9 MMOL/L (ref 3.5–5.1)
PREALB SERPL-MCNC: 4.1 MG/DL (ref 20–40)
RBC # BLD: 2.85 M/UL (ref 4.33–5.43)

## 2020-09-01 RX ADMIN — PIPERACILLIN SODIUM AND TAZOBACTAM SODIUM SCH MLS: 3; .375 INJECTION, POWDER, LYOPHILIZED, FOR SOLUTION INTRAVENOUS at 06:00

## 2020-09-01 RX ADMIN — DEXTROSE MONOHYDRATE PRN GM: 25 INJECTION, SOLUTION INTRAVENOUS at 15:41

## 2020-09-01 RX ADMIN — DEXTROSE MONOHYDRATE PRN GM: 25 INJECTION, SOLUTION INTRAVENOUS at 10:41

## 2020-09-01 RX ADMIN — Medication SCH ML: at 22:14

## 2020-09-01 RX ADMIN — HUMAN INSULIN SCH: 100 INJECTION, SOLUTION SUBCUTANEOUS at 18:00

## 2020-09-01 RX ADMIN — SODIUM CHLORIDE SCH: 9 INJECTION, SOLUTION INTRAVENOUS at 09:00

## 2020-09-01 RX ADMIN — SOYBEAN OIL SCH MLS: 20 INJECTION, SOLUTION INTRAVENOUS at 17:28

## 2020-09-01 RX ADMIN — HUMAN INSULIN SCH UNIT: 100 INJECTION, SOLUTION SUBCUTANEOUS at 23:33

## 2020-09-01 RX ADMIN — HYDRALAZINE HYDROCHLORIDE PRN MG: 20 INJECTION INTRAMUSCULAR; INTRAVENOUS at 15:36

## 2020-09-01 RX ADMIN — ENOXAPARIN SODIUM SCH MG: 40 INJECTION SUBCUTANEOUS at 09:00

## 2020-09-01 RX ADMIN — HUMAN INSULIN SCH: 100 INJECTION, SOLUTION SUBCUTANEOUS at 11:30

## 2020-09-01 RX ADMIN — THIAMINE HYDROCHLORIDE SCH MG: 100 INJECTION, SOLUTION INTRAMUSCULAR; INTRAVENOUS at 09:00

## 2020-09-01 RX ADMIN — Medication SCH ML: at 09:00

## 2020-09-01 RX ADMIN — PIPERACILLIN SODIUM AND TAZOBACTAM SODIUM SCH MLS: 3; .375 INJECTION, POWDER, LYOPHILIZED, FOR SOLUTION INTRAVENOUS at 22:15

## 2020-09-01 RX ADMIN — HUMAN INSULIN SCH: 100 INJECTION, SOLUTION SUBCUTANEOUS at 07:30

## 2020-09-01 RX ADMIN — PIPERACILLIN SODIUM AND TAZOBACTAM SODIUM SCH MLS: 3; .375 INJECTION, POWDER, LYOPHILIZED, FOR SOLUTION INTRAVENOUS at 14:21

## 2020-09-01 NOTE — P.PN
Subjective


Date of Service: 09/01/20


Primary Care Provider: unknown


Chief Complaint: Aspiration pneumonia


Subjective: Other (Patient was doing well this morning but after breakfast the 

patient appeared to have aspirated.  Mild respiratory distress noted. 

Respiratory called.)





Physical Examination





- Vital Signs


Temperature: 97.7 F


Blood Pressure: 114/84


Pulse: 82


Respirations: 18


Pulse Ox (%): 97





- Physical Exam


General: Alert


HEENT: Atraumatic


Neck: Supple


Respiratory: Crackles/rales (Crackles to the bases)


Cardiovascular: Normal pulses, Regular rate/rhythm


Gastrointestinal: Normal bowel sounds, No tenderness, No masses, No rebound, No 

guarding


Integumentary: No warmth, No cyanosis, Tenderness/swelling (Mild swelling to the

face and lower extremities.)


Neurological: Normal speech, Normal strength at 5/5 x4 extr, Normal tone, Normal

affect





- Studies


Medications List Reviewed: Yes





Assessment & Plan


Discharge Plan: Other (Long-term acute care facility verses skilled placement)


Plan to discharge in: Greater than 2 days


Physician Review Additional Text: 





Impression:


Altered mental status secondary to acute toxic encephalopathy with severe sepsis

likely related to acute respiratory failure/right upper lobe aspiration 

pneumonia,  sputum culture positive for E coli


Neutropenia and anemia chronic disease likely related to above


Hypoglycemia with history of type 1 diabetes


Drug abuse history with noted positive drug screen for amphetamines, 

benzodiazepine and THC


Elevated liver function likely related to above


Suspect possible adrenal insufficiency


Severe protein malnutrition with hypocalcemia, hypomagnesia





Plan:


Altered mental status secondary to acute toxic encephalopathy with severe sepsis

likely related to acute respiratory failure/right upper lobe aspiration 

pneumonia, sputum culture positive for E coli:  Patient had speech evaluation 

and barium swallow yesterday.  No evidence of aspiration noted. Blood sugars 

were elevated last night.  This morning blood sugars were low.  Patient ate this

am but had some clear aspiration.  Patient now NPO.  Respiratory to adjust high-

flow oxygen.  Patient may require BiPAP.  Will check chest x-ray.  Spoke at 

length with pulmonology.  Will consult GI to further evaluate.  Patient may have

esophagitis versus esophageal stricture versus other.  Patient will require a 

EGD for further evaluation.  Blood sugar also low.  Patient to be restarted on 

D5 half-normal saline.  D50 to be given.  Anxiety medication provided.  Dietary 

to evaluate to start PPN.  Await further recommendations from GI and 

pulmonology.  Will need to discuss with family about prior history of GI related

issues.  Will need to consider long-term acute care facility placement verses 

skilled placement at discharge. Will continue to reassess.  ICU level of care-40

min.


Neutropenia and anemia chronic disease likely related to above:  This has 

improved with antibiotic treatment.  Will continue monitor closely.  Maintain 

hemoglobin above 8.0..


Hypoglycemia with history of type 1 diabetes:  Blood sugar initially elevated.  

But low this morning.  Will restart D5 half-normal saline.  The 50 to be given. 

Patient did not have insulin pump.  This was a blood sugar monitoring device 

that he had.  Continue as above.  Patient to be NPO.  Will start PPN.  Continue 

monitor hypoglycemia


Drug abuse history with noted positive drug screen for amphetamines, 

benzodiazepine and THC:  Mother reports patient still abuses medication.  Will 

monitor for withdrawal.


Elevated liver function likely related to above:  Monitor liver function test 

closely.  Will obtain hepatitis and HIV panel.  Will discuss with GI


Suspect possible adrenal insufficiency:  Patient now on Decadron.  Patient to 

have cortisol level recheck..


Severe protein malnutrition with hypocalcemia and hypomagnesia:  Continue as 

above.


Time Spent Managing Pts Care (In Minutes): 65

## 2020-09-01 NOTE — RAD REPORT
EXAM DESCRIPTION:  RAD - Chest Single View - 9/1/2020 9:09 am

 

CLINICAL HISTORY:  sob

 

COMPARISON:  Portable August 30

 

TECHNIQUE:  AP portable chest image was obtained 9/1/2020 9:09 am .

 

FINDINGS:  Endotracheal tube and NG tube have been removed. Dense airspace opacification in the right
 lung field and in the lower left lung field are still present not clearly different when adjusting f
or technique differences. Heart size is upper normal. Vasculature within normal limits. No measurable
 pleural effusion and no pneumothorax. No acute bony abnormality seen. No acute aortic findings suspe
cted.

 

IMPRESSION:  Extensive bilateral pneumonia, right greater than left with no improvement from August 3
0 imaging.

 

ET tube and NG tube have been removed.

## 2020-09-01 NOTE — ECHO
HEIGHT: 5 ft 5 in   WEIGHT: 100 lb 0.027 oz   DATE OF STUDY: 09/01/2020   REFER DR: 
Kevin Elmore DO

2-DIMENSIONAL: YES

     M.MODE: YES

 DOPPLER: YES

COLOR FLOW: YES



                    TDS:  

PORTABLE: YES

 DEFINITY:  

BUBBLE STUDY: 





DIAGNOSIS:  SEPSIS



CARDIAC HISTORY:  

CATHERIZATION: 

SURGERY: 

PROSTHETIC VALVE: 

PACEMAKER: 





MEASUREMENTS (cm)

    DIASTOLIC (NORMALS)                 SYSTOLIC (NORMALS)

IVSd                 1.0 (0.6-1.2)                    LA Diam 3.1 (1.9-4.0)     LVEF       
  49%  

LVIDd               3.9 (3.5-5.7)                        LVIDs      2.9 (2.0-3.5)     %FS  
        24%

LVPWd             0.9 (0.6-1.2)

Ao Diam           2.8 (2.0-3.7)



2 DIMENSIONAL ASSESSMENT:

RIGHT ATRIUM:                   NORMAL

LEFT ATRIUM:       NORMAL



RIGHT VENTRICLE:            NORMAL

LEFT VENTRICLE: NORMAL



TRICUSPID VALVE:             NORMAL

MITRAL VALVE:     MITRAL ANNULAR CALCIFICATION



PULMONIC VALVE:             NORMAL

AORTIC VALVE:     SCLEROSIS



PERICARDIAL EFFUSION: NONE

AORTIC ROOT:      NORMAL





LEFT VENTRICULAR WALL MOTION:     NORMAL



DOPPLER/COLOR FLOW:     TRACE TRICUSPID REGURGITATION



COMMENTS:      NORMAL LEFT VENTRICULAR SIZE AND FUNCTION.  MITRAL ANNULAR CALCIFICATION.  
AORTIC SCLEROSIS.  MILD TRICUSPID REGURGITATION.  NORMAL RIGHT VENTRICULAR SYSTOLIC 
PRESSURE.



TECHNOLOGIST:   MARILU HIGGINBOTHAM

## 2020-09-01 NOTE — P.PN
Subjective


Date of Service: 09/07/20


Primary Care Provider: unknown


Chief Complaint: Aspiration pneumonia





Patient's condition deteriorated today but after he started eating may have 

aspirated again chest x-ray does look worse patient's steroids were stopped 

yesterday has cough congestion shortness of breath in distress





Review of Systems


General: Weakness


Respiratory: Cough, Shortness of Breath





Physical Examination





- Vital Signs


Temperature: 97.7 F


Blood Pressure: 114/84


Pulse: 82


Respirations: 18


Pulse Ox (%): 97





- Physical Exam


General: Alert, Moderate distress


Respiratory: Crackles/rales (Crackles bilaterally)


Cardiovascular: No edema, Normal S1 S2


Gastrointestinal: Normal bowel sounds, Soft and benign





- Studies


Medications List Reviewed: Yes





Assessment & Plan





- Problems (Diagnosis)


(1) Pneumonia


Current Visit: Yes   Status: Acute   


Plan: 


Patient is got fairly extensive bilateral pneumonia continue with steroids check

serum cortisol level possible ACTH stimulation test later he is also 

hypoglycemic again recommend NPO GI evaluation PPN PICC line continue with Zosyn


Qualifiers: 


   Pneumonia type: due to unspecified organism   Laterality: bilateral 


Physician Review Additional Text: 





I

## 2020-09-02 LAB
BUN BLD-MCNC: 31 MG/DL (ref 7–18)
GLUCOSE SERPLBLD-MCNC: 341 MG/DL (ref 74–106)
HCT VFR BLD CALC: 25.4 % (ref 39.6–49)
HCV RNA SPEC NAA+PROBE-LOG#: <1.18 LOG IU/ML
INR BLD: 1.44
LYMPHOCYTES # SPEC AUTO: 0.2 K/UL (ref 0.7–4.9)
MAGNESIUM SERPL-MCNC: 2.1 MG/DL (ref 1.8–2.4)
PMV BLD: 8.3 FL (ref 7.6–11.3)
POTASSIUM SERPL-SCNC: 3.5 MMOL/L (ref 3.5–5.1)
RBC # BLD: 2.82 M/UL (ref 4.33–5.43)

## 2020-09-02 RX ADMIN — HUMAN INSULIN SCH UNIT: 100 INJECTION, SOLUTION SUBCUTANEOUS at 18:03

## 2020-09-02 RX ADMIN — SODIUM CHLORIDE SCH MLS: 9 INJECTION, SOLUTION INTRAVENOUS at 09:11

## 2020-09-02 RX ADMIN — PIPERACILLIN SODIUM AND TAZOBACTAM SODIUM SCH MLS: 3; .375 INJECTION, POWDER, LYOPHILIZED, FOR SOLUTION INTRAVENOUS at 05:37

## 2020-09-02 RX ADMIN — HUMAN INSULIN SCH UNIT: 100 INJECTION, SOLUTION SUBCUTANEOUS at 12:04

## 2020-09-02 RX ADMIN — HYDRALAZINE HYDROCHLORIDE PRN MG: 20 INJECTION INTRAMUSCULAR; INTRAVENOUS at 13:06

## 2020-09-02 RX ADMIN — THIAMINE HYDROCHLORIDE SCH MG: 100 INJECTION, SOLUTION INTRAMUSCULAR; INTRAVENOUS at 08:26

## 2020-09-02 RX ADMIN — Medication SCH ML: at 20:47

## 2020-09-02 RX ADMIN — HUMAN INSULIN SCH: 100 INJECTION, SOLUTION SUBCUTANEOUS at 06:26

## 2020-09-02 RX ADMIN — ENOXAPARIN SODIUM SCH MG: 40 INJECTION SUBCUTANEOUS at 08:28

## 2020-09-02 RX ADMIN — PIPERACILLIN SODIUM AND TAZOBACTAM SODIUM SCH MLS: 3; .375 INJECTION, POWDER, LYOPHILIZED, FOR SOLUTION INTRAVENOUS at 21:54

## 2020-09-02 RX ADMIN — Medication SCH ML: at 08:29

## 2020-09-02 RX ADMIN — HYDRALAZINE HYDROCHLORIDE PRN MG: 20 INJECTION INTRAMUSCULAR; INTRAVENOUS at 21:58

## 2020-09-02 RX ADMIN — SOYBEAN OIL SCH MLS: 20 INJECTION, SOLUTION INTRAVENOUS at 16:46

## 2020-09-02 RX ADMIN — HUMAN INSULIN SCH UNIT: 100 INJECTION, SOLUTION SUBCUTANEOUS at 05:46

## 2020-09-02 RX ADMIN — PIPERACILLIN SODIUM AND TAZOBACTAM SODIUM SCH MLS: 3; .375 INJECTION, POWDER, LYOPHILIZED, FOR SOLUTION INTRAVENOUS at 13:22

## 2020-09-02 NOTE — RAD REPORT
EXAM DESCRIPTION:  RAD - Barium Swallow Modified - 9/2/2020 11:54 am

 

CLINICAL HISTORY:  swallow eval followup

 

COMPARISON:  None.

 

TECHNIQUE:  The patient was given liquid, semi-solid and solid forms of barium. Lateral view fluorosc
opic imaging was performed in conjunction with speech pathology service.

 

 

FINDINGS:  Cineloop acquisitions: 17

Fluoro time: 2 minutes 50 seconds

 

MBS Findings:

Laryngeal peetration, not cleared with thin liquid.

Aspiration: cough with thin liquid

Pharyngeal residue: in the pyriform was mild with thin liquid and pudding.

Aspiration due to decreased oral control and manipulation resulting in premature spillage down to pyr
iforms which then spills over into airway.

 

IMPRESSION:  Modified barium swallow as summarized above and fully detailed on speech pathology repor
[place]

## 2020-09-02 NOTE — P.PN
Subjective


Date of Service: 09/02/20


Primary Care Provider: unknown


Chief Complaint: Aspiration pneumonia


Subjective: Doing well





Physical Examination





- Vital Signs


Temperature: 98.2 F


Blood Pressure: 144/85


Pulse: 85


Respirations: 21


Pulse Ox (%): 95





- Physical Exam


General: Alert, Cooperative


HEENT: Atraumatic


Neck: Supple


Respiratory: Crackles/rales


Cardiovascular: Normal pulses, Regular rate/rhythm


Gastrointestinal: Normal bowel sounds, Soft and benign, Non-distended


Neurological: Normal speech, Normal strength at 5/5 x4 extr, Normal tone





- Studies


Medications List Reviewed: Yes





Assessment & Plan


Discharge Plan: Other (Skilled nursing facility)


Plan to discharge in: Greater than 2 days


Physician Review Additional Text: 





Impression:


Altered mental status secondary to acute toxic encephalopathy with severe sepsis

likely related to acute respiratory failure/right upper lobe aspiration 

pneumonia,  sputum culture positive for E coli


Neutropenia and anemia chronic disease likely related to above


Hypoglycemia with history of type 1 diabetes


Drug abuse history with noted positive drug screen for amphetamines, 

benzodiazepine and THC


Elevated liver function likely related to above


Suspect possible adrenal insufficiency


Severe protein malnutrition with hypocalcemia, hypomagnesia





Plan:


Altered mental status secondary to acute toxic encephalopathy with severe sepsis

likely related to acute respiratory failure/right upper lobe aspiration 

pneumonia, sputum culture positive for E coli:  Repeat evaluation shows no 

aspiration.  But patient clearly aspirated the other day.  Continue NPO.  GI 

will plan for EGD with possible PEG tube.  Continue current management.  Will 

discuss further with pulmonology. 


Neutropenia and anemia chronic disease likely related to above:  This has 

improved with antibiotic treatment.  Will continue monitor closely.  Maintain 

hemoglobin above 8.0..


Hypoglycemia with history of type 1 diabetes:  Continue to adjust medications.


Drug abuse history with noted positive drug screen for amphetamines, 

benzodiazepine and THC:  Mother reports patient still abuses medication.  Will 

monitor for withdrawal.


Elevated liver function likely related to above:  Monitor liver function test 

closely.  Will obtain hepatitis and HIV panel.  Will discuss with GI


Suspect possible adrenal insufficiency:  Patient now on Decadron.  Patient to 

have cortisol level recheck..


Severe protein malnutrition with hypocalcemia and hypomagnesia:  Continue as 

above.


Time Spent Managing Pts Care (In Minutes): 55

## 2020-09-03 LAB
ANISOCYTOSIS BLD QL: (no result)
BUN BLD-MCNC: 33 MG/DL (ref 7–18)
GLUCOSE SERPLBLD-MCNC: 163 MG/DL (ref 74–106)
HCT VFR BLD CALC: 25.1 % (ref 39.6–49)
HYPOCHROMIA BLD QL: (no result)
INR BLD: 1.4
LYMPHOCYTES # SPEC AUTO: 0.3 K/UL (ref 0.7–4.9)
MAGNESIUM SERPL-MCNC: 2.1 MG/DL (ref 1.8–2.4)
MORPHOLOGY BLD-IMP: (no result)
PMV BLD: 8.2 FL (ref 7.6–11.3)
POTASSIUM SERPL-SCNC: 3.4 MMOL/L (ref 3.5–5.1)
RBC # BLD: 2.81 M/UL (ref 4.33–5.43)

## 2020-09-03 RX ADMIN — HUMAN INSULIN SCH UNIT: 100 INJECTION, SOLUTION SUBCUTANEOUS at 18:04

## 2020-09-03 RX ADMIN — HUMAN INSULIN SCH UNIT: 100 INJECTION, SOLUTION SUBCUTANEOUS at 00:01

## 2020-09-03 RX ADMIN — THIAMINE HYDROCHLORIDE SCH MG: 100 INJECTION, SOLUTION INTRAMUSCULAR; INTRAVENOUS at 08:00

## 2020-09-03 RX ADMIN — POTASSIUM CHLORIDE SCH MLS: 200 INJECTION, SOLUTION INTRAVENOUS at 05:46

## 2020-09-03 RX ADMIN — HYDRALAZINE HYDROCHLORIDE PRN MG: 20 INJECTION INTRAMUSCULAR; INTRAVENOUS at 14:30

## 2020-09-03 RX ADMIN — POTASSIUM CHLORIDE SCH MLS: 200 INJECTION, SOLUTION INTRAVENOUS at 07:29

## 2020-09-03 RX ADMIN — PIPERACILLIN SODIUM AND TAZOBACTAM SODIUM SCH MLS: 3; .375 INJECTION, POWDER, LYOPHILIZED, FOR SOLUTION INTRAVENOUS at 13:11

## 2020-09-03 RX ADMIN — HUMAN INSULIN SCH UNIT: 100 INJECTION, SOLUTION SUBCUTANEOUS at 12:33

## 2020-09-03 RX ADMIN — PIPERACILLIN SODIUM AND TAZOBACTAM SODIUM SCH MLS: 3; .375 INJECTION, POWDER, LYOPHILIZED, FOR SOLUTION INTRAVENOUS at 05:38

## 2020-09-03 RX ADMIN — HYDRALAZINE HYDROCHLORIDE PRN MG: 20 INJECTION INTRAMUSCULAR; INTRAVENOUS at 04:13

## 2020-09-03 RX ADMIN — Medication SCH ML: at 22:16

## 2020-09-03 RX ADMIN — DEXTROSE MONOHYDRATE SCH: 100 INJECTION, SOLUTION INTRAVENOUS at 14:34

## 2020-09-03 RX ADMIN — Medication SCH ML: at 08:02

## 2020-09-03 RX ADMIN — HUMAN INSULIN SCH UNIT: 100 INJECTION, SOLUTION SUBCUTANEOUS at 05:39

## 2020-09-03 RX ADMIN — DEXTROSE MONOHYDRATE SCH: 100 INJECTION, SOLUTION INTRAVENOUS at 09:00

## 2020-09-03 RX ADMIN — SODIUM CHLORIDE SCH MLS: 9 INJECTION, SOLUTION INTRAVENOUS at 08:00

## 2020-09-03 RX ADMIN — SOYBEAN OIL SCH MLS: 20 INJECTION, SOLUTION INTRAVENOUS at 18:04

## 2020-09-03 NOTE — P.PN
Subjective


Date of Service: 09/07/20


Primary Care Provider: unknown


Chief Complaint: Aspiration pneumonia


Patient is improving is currently on 2 L on nasal cannula oxygen scheduled to 

have endoscopy with possible PEG tube tomorrow no new complaints participating 

with physical therapy sugars are satisfactory





Review of Systems


General: Weakness


Respiratory: Shortness of Breath





Physical Examination





- Vital Signs


Temperature: 97.1 F


Blood Pressure: 162/114


Pulse: 90


Respirations: 24


Pulse Ox (%): 88





- Physical Exam


General: Alert, Oriented x3


Respiratory: Crackles/rales


Cardiovascular: No edema, Regular rate/rhythm





- Studies


Microbiology Data (last 24 hrs): 








08/28/20 21:50   Blood  - Blood   Aerobic Blood Culture - Final


                            No growth in 5 days.


08/28/20 21:50   Blood  - Blood   Anaerobic Blood Culture - Final


                            No growth in 5 days.


08/28/20 21:34   Blood  - Blood   Aerobic Blood Culture - Final


                            No growth in 5 days.


08/28/20 21:34   Blood  - Blood   Anaerobic Blood Culture - Final


                            No growth in 5 days.





Medications List Reviewed: Yes





Assessment & Plan





- Problems (Diagnosis)


(1) Pneumonia


Current Visit: Yes   Status: Acute   


Plan: 


Patient admitted with aspiration pneumonia is clinically improving continue with

Zosyn EGD scheduled tomorrow reduce the dose of steroids as sitter weaning of D 

10 chemistries reviewed mildly anemic medication list reviewed


Qualifiers: 


   Pneumonia type: due to unspecified organism   Laterality: bilateral 


Physician Review Additional Text: 





I

## 2020-09-03 NOTE — RAD REPORT
EXAM DESCRIPTION:  RAD - Chest Single View - 9/3/2020 1:55 pm

 

CLINICAL HISTORY:  Pneumonia

 

COMPARISON:  Portable September 1

 

TECHNIQUE:  AP portable chest image was obtained 9/3/2020 1:55 pm .

 

FINDINGS:  Lung volumes similar to prior study. Patient has bilateral alveolar opacification substant
ially improved from the prior examination. Gross estimate is greater than 50% reduction in the lung p
arenchymal involvement. Trachea is midline. Heart and vasculature are normal. No measurable pleural e
ffusion and no pneumothorax. No acute bony abnormality seen. No acute aortic findings suspected.

 

IMPRESSION:  Bilateral pneumonia findings are present showing substantial improvement from the Septem
ber 1 study.

## 2020-09-03 NOTE — P.PN
Subjective


Date of Service: 09/03/20


Primary Care Provider: unknown


Chief Complaint: Aspiration pneumonia


Subjective: Other (Patient feels better.  Patient currently NPO.  To obtain EGD 

with possible PEG tube.)





Physical Examination





- Vital Signs


Temperature: 98.6 F


Blood Pressure: 149/89


Pulse: 85


Respirations: 20


Pulse Ox (%): 90





- Physical Exam


General: Alert, In no apparent distress, Cooperative


HEENT: Atraumatic


Neck: Supple


Respiratory: Crackles/rales


Cardiovascular: Normal pulses, Regular rate/rhythm


Gastrointestinal: Normal bowel sounds, Soft and benign, Non-distended, No 

masses, No rebound, No guarding


Integumentary: Tenderness/swelling (Some swelling to the lower extremities and 

face.  Poor nutritional status noted)


Neurological: Normal speech, Normal strength at 5/5 x4 extr, Normal tone, Normal

affect





- Studies


Microbiology Data (last 24 hrs): 








08/28/20 21:50   Blood  - Blood   Aerobic Blood Culture - Final


                            No growth in 5 days.


08/28/20 21:50   Blood  - Blood   Anaerobic Blood Culture - Final


                            No growth in 5 days.


08/28/20 21:34   Blood  - Blood   Aerobic Blood Culture - Final


                            No growth in 5 days.


08/28/20 21:34   Blood  - Blood   Anaerobic Blood Culture - Final


                            No growth in 5 days.





Medications List Reviewed: Yes





Assessment & Plan


Discharge Plan: Other (Possible long-term care)


Plan to discharge in: 72 Hours


Physician Review Additional Text: 





Impression:


Altered mental status secondary to acute toxic encephalopathy with severe sepsis

likely related to acute respiratory failure/right upper lobe aspiration 

pneumonia,  sputum culture positive for E coli


Neutropenia and anemia chronic disease likely related to above


Hypoglycemia with history of type 1 diabetes


Drug abuse history with noted positive drug screen for amphetamines, 

benzodiazepine and THC


Elevated liver function likely related to above


Suspect possible adrenal insufficiency


Severe protein malnutrition with hypocalcemia, hypomagnesia


Thrombocytopenia likely related to sepsis





Plan:


Altered mental status secondary to acute toxic encephalopathy with severe sepsis

likely related to acute respiratory failure/right upper lobe aspiration 

pneumonia, sputum culture positive for E coli:  Repeat swallow evaluation shows 

no aspiration.  Case discussed at length with GI.  GI will perform EGD and 

possible PEG tube placement today.  Await findings.  Currently NPO.  Patient 

received the knees oral feeds.  Will discuss further with specialty care.  

Social work to evaluate for possible long-term care.  Physical therapy to 

ambulate.  Will transfer the patient to the floor after EGD. 


Neutropenia and anemia chronic disease likely related to above:  This has 

improved with antibiotic treatment.  Will continue monitor closely.  Maintain h

emoglobin above 8.0..


Hypoglycemia with history of type 1 diabetes:  Continue to adjust medications.  

Accu-Cheks monitored.


Drug abuse history with noted positive drug screen for amphetamines, 

benzodiazepine and THC:  Mother reports patient still abuses medication.  Will 

monitor for withdrawal.


Elevated liver function likely related to above:  Monitor liver function test 

closely.  Will obtain hepatitis and HIV panel.  Will discuss with GI


Suspect possible adrenal insufficiency:  Patient now on Decadron.  Patient to 

have cortisol level recheck..


Severe protein malnutrition with hypocalcemia and hypomagnesia:  Continue as 

above.


Thrombocytopenia likely related to sepsis:  We will review lab.  Further lab 

obtained.  Will discuss further with pulmonology.  Obtain peripheral smear Will 

consider discussing with hematology.  Hold Lovenox.


Time Spent Managing Pts Care (In Minutes): 55

## 2020-09-04 LAB
BUN BLD-MCNC: 28 MG/DL (ref 7–18)
GLUCOSE SERPLBLD-MCNC: 124 MG/DL (ref 74–106)
HCT VFR BLD CALC: 23.9 % (ref 39.6–49)
LYMPHOCYTES # SPEC AUTO: 0.4 K/UL (ref 0.7–4.9)
MAGNESIUM SERPL-MCNC: 1.9 MG/DL (ref 1.8–2.4)
PMV BLD: 8.5 FL (ref 7.6–11.3)
POTASSIUM SERPL-SCNC: 3.7 MMOL/L (ref 3.5–5.1)
RBC # BLD: 2.71 M/UL (ref 4.33–5.43)

## 2020-09-04 RX ADMIN — Medication SCH ML: at 21:09

## 2020-09-04 RX ADMIN — HUMAN INSULIN SCH UNIT: 100 INJECTION, SOLUTION SUBCUTANEOUS at 18:48

## 2020-09-04 RX ADMIN — Medication SCH ML: at 10:09

## 2020-09-04 RX ADMIN — HYDRALAZINE HYDROCHLORIDE PRN MG: 20 INJECTION INTRAMUSCULAR; INTRAVENOUS at 02:18

## 2020-09-04 RX ADMIN — THIAMINE HYDROCHLORIDE SCH MG: 100 INJECTION, SOLUTION INTRAMUSCULAR; INTRAVENOUS at 10:08

## 2020-09-04 RX ADMIN — HUMAN INSULIN SCH UNIT: 100 INJECTION, SOLUTION SUBCUTANEOUS at 01:06

## 2020-09-04 RX ADMIN — DEXTROSE MONOHYDRATE SCH MLS: 100 INJECTION, SOLUTION INTRAVENOUS at 05:12

## 2020-09-04 RX ADMIN — PIPERACILLIN SODIUM AND TAZOBACTAM SODIUM SCH MLS: 3; .375 INJECTION, POWDER, LYOPHILIZED, FOR SOLUTION INTRAVENOUS at 16:53

## 2020-09-04 RX ADMIN — DEXTROSE MONOHYDRATE SCH MLS: 100 INJECTION, SOLUTION INTRAVENOUS at 11:46

## 2020-09-04 RX ADMIN — HUMAN INSULIN SCH: 100 INJECTION, SOLUTION SUBCUTANEOUS at 12:00

## 2020-09-04 RX ADMIN — SODIUM CHLORIDE SCH MLS: 9 INJECTION, SOLUTION INTRAVENOUS at 10:08

## 2020-09-04 RX ADMIN — PIPERACILLIN SODIUM AND TAZOBACTAM SODIUM SCH MLS: 3; .375 INJECTION, POWDER, LYOPHILIZED, FOR SOLUTION INTRAVENOUS at 10:08

## 2020-09-04 RX ADMIN — PIPERACILLIN SODIUM AND TAZOBACTAM SODIUM SCH MLS: 3; .375 INJECTION, POWDER, LYOPHILIZED, FOR SOLUTION INTRAVENOUS at 01:06

## 2020-09-04 RX ADMIN — HUMAN INSULIN SCH UNIT: 100 INJECTION, SOLUTION SUBCUTANEOUS at 06:27

## 2020-09-04 NOTE — RAD REPORT
EXAM DESCRIPTION:  RAD - Chest Single View - 9/4/2020 5:23 pm

 

CLINICAL HISTORY:   Device placement Dobhoff tube placement

 

IMPRESSION:  A Dobhoff tube lies is coiled within the distal stomach. The tip is pointing proximally 
within the stomach.

 

The tube should be retracted approximately 8 centimeters then advanced

## 2020-09-04 NOTE — RAD REPORT
EXAM DESCRIPTION:  Anneliese Single View9/4/2020 7:19 pm

 

CLINICAL HISTORY:  Device placement Dobbhoff tube placement

 

IMPRESSION:  A Dobbhoff tube is coiled within the distal stomach. The tip is pointed proximally

## 2020-09-04 NOTE — P.PN
Subjective


Date of Service: 09/04/20


Primary Care Provider: unknown


Chief Complaint: Aspiration pneumonia


Subjective: Improving





Physical Examination





- Vital Signs


Temperature: 97.9 F


Blood Pressure: 140/93


Pulse: 74


Respirations: 18


Pulse Ox (%): 95





- Physical Exam


General: Alert, In no apparent distress, Oriented x3, Cooperative


HEENT: Atraumatic


Neck: Supple


Respiratory: Crackles/rales


Cardiovascular: Normal pulses, Regular rate/rhythm


Gastrointestinal: Normal bowel sounds, Soft and benign, Non-distended, No 

tenderness, No masses, No rebound, No guarding


Integumentary: No erythema, No warmth, No cyanosis


Neurological: Normal speech, Normal strength at 5/5 x4 extr, Normal tone, Normal

affect





- Studies


Medications List Reviewed: Yes





Assessment & Plan


Discharge Plan: Home


Plan to discharge in: 72 Hours


Physician Review Additional Text: 





Impression:


Altered mental status secondary to acute toxic encephalopathy with severe sepsis

likely related to acute respiratory failure/right upper lobe aspiration 

pneumonia,  sputum culture positive for E coli


Neutropenia and anemia chronic disease likely related to above


Hypoglycemia with history of type 1 diabetes


Drug abuse history with noted positive drug screen for amphetamines, 

benzodiazepine and THC


Elevated liver function likely related to above


Suspect possible adrenal insufficiency


Severe protein malnutrition with hypocalcemia, hypomagnesia


Thrombocytopenia likely related to sepsis





Plan:


Altered mental status secondary to acute toxic encephalopathy with severe sepsis

likely related to acute respiratory failure/right upper lobe aspiration 

pneumonia, sputum culture positive for E coli:  Repeat swallow evaluation shows 

no aspiration.  Case discussed at length with GI.  EGD was to be done today but 

this has been rescheduled for possible tomorrow due to low platelet.  Will place

Dobhoff and start feeds.  Discontinue PPN.  Discontinue D 10.  Continue to wean 

off Decadron.  Will monitor for hypoglycemia.  Await further recommendations by 

pulmonology and GI.  Will continue to monitor closely.  Anticipate improvement 

over the next several days.  Likely home at final discharge. Continue physical 

therapy. 


Neutropenia and anemia chronic disease likely related to above:  Neutropenia 

resolved.  Anemia remains stable.  Maintain hemoglobin above 7.5.  Platelet 

count low.  Anticipate improvement with course.  Platelet count likely low due 

to sepsis. 


Hypoglycemia with history of type 1 diabetes:  Continue to adjust medications.  

Accu-Cheks monitored.  Discontinue D 10.


Drug abuse history with noted positive drug screen for amphetamines, 

benzodiazepine and THC:  Mother reports patient still abuses medication.  Will 

monitor for withdrawal.


Elevated liver function likely related to above:  Monitor liver function test 

closely.  Will obtain hepatitis and HIV panel.  Will discuss with GI


Suspect possible adrenal insufficiency:  Patient now on Decadron.  Continue to 

wean off Decadron


Severe protein malnutrition with hypocalcemia and hypomagnesia:  Continue as 

above.


Thrombocytopenia likely related to sepsis:  Lab reviewed likely related to inf

lammation/sepsis.  Lovenox discontinued.  EGD to be done once platelet count 

improved. 


Time Spent Managing Pts Care (In Minutes): 55

## 2020-09-05 LAB
BUN BLD-MCNC: 25 MG/DL (ref 7–18)
GLUCOSE SERPLBLD-MCNC: 87 MG/DL (ref 74–106)
HCT VFR BLD CALC: 22 % (ref 39.6–49)
INR BLD: 1.82
LYMPHOCYTES # SPEC AUTO: 0.6 K/UL (ref 0.7–4.9)
MAGNESIUM SERPL-MCNC: 1.7 MG/DL (ref 1.8–2.4)
PMV BLD: 9.1 FL (ref 7.6–11.3)
POTASSIUM SERPL-SCNC: 4 MMOL/L (ref 3.5–5.1)
RBC # BLD: 2.52 M/UL (ref 4.33–5.43)

## 2020-09-05 PROCEDURE — 0DH63UZ INSERTION OF FEEDING DEVICE INTO STOMACH, PERCUTANEOUS APPROACH: ICD-10-PCS

## 2020-09-05 PROCEDURE — 0DJ08ZZ INSPECTION OF UPPER INTESTINAL TRACT, VIA NATURAL OR ARTIFICIAL OPENING ENDOSCOPIC: ICD-10-PCS

## 2020-09-05 RX ADMIN — HUMAN INSULIN SCH: 100 INJECTION, SOLUTION SUBCUTANEOUS at 12:00

## 2020-09-05 RX ADMIN — SODIUM CHLORIDE SCH MLS: 9 INJECTION, SOLUTION INTRAVENOUS at 08:50

## 2020-09-05 RX ADMIN — PIPERACILLIN SODIUM AND TAZOBACTAM SODIUM SCH MLS: 3; .375 INJECTION, POWDER, LYOPHILIZED, FOR SOLUTION INTRAVENOUS at 01:04

## 2020-09-05 RX ADMIN — HUMAN INSULIN SCH: 100 INJECTION, SOLUTION SUBCUTANEOUS at 00:00

## 2020-09-05 RX ADMIN — DEXTROSE AND SODIUM CHLORIDE SCH MLS: 5; .45 INJECTION, SOLUTION INTRAVENOUS at 05:25

## 2020-09-05 RX ADMIN — HUMAN INSULIN SCH: 100 INJECTION, SOLUTION SUBCUTANEOUS at 06:00

## 2020-09-05 RX ADMIN — HUMAN INSULIN SCH UNIT: 100 INJECTION, SOLUTION SUBCUTANEOUS at 19:49

## 2020-09-05 RX ADMIN — DEXTROSE AND SODIUM CHLORIDE SCH MLS: 5; .45 INJECTION, SOLUTION INTRAVENOUS at 22:23

## 2020-09-05 RX ADMIN — THIAMINE HYDROCHLORIDE SCH MG: 100 INJECTION, SOLUTION INTRAMUSCULAR; INTRAVENOUS at 08:50

## 2020-09-05 RX ADMIN — MORPHINE SULFATE PRN MG: 2 INJECTION, SOLUTION INTRAMUSCULAR; INTRAVENOUS at 15:42

## 2020-09-05 RX ADMIN — Medication SCH ML: at 08:49

## 2020-09-05 RX ADMIN — PIPERACILLIN SODIUM AND TAZOBACTAM SODIUM SCH MLS: 3; .375 INJECTION, POWDER, LYOPHILIZED, FOR SOLUTION INTRAVENOUS at 17:34

## 2020-09-05 RX ADMIN — Medication SCH ML: at 21:27

## 2020-09-05 RX ADMIN — MORPHINE SULFATE PRN MG: 2 INJECTION, SOLUTION INTRAMUSCULAR; INTRAVENOUS at 21:27

## 2020-09-05 RX ADMIN — PIPERACILLIN SODIUM AND TAZOBACTAM SODIUM SCH MLS: 3; .375 INJECTION, POWDER, LYOPHILIZED, FOR SOLUTION INTRAVENOUS at 08:49

## 2020-09-05 NOTE — P.PN
Subjective


Date of Service: 09/05/20


Primary Care Provider: unknown


Chief Complaint: Aspiration pneumonia


Subjective: No new changes, No C/O voiced (-indwelling NGT)





Physical Examination





- Vital Signs


Temperature: 98.5 F


Blood Pressure: 125/76


Pulse: 70


Respirations: 16


Pulse Ox (%): 96





- Physical Exam


General: Alert, In no apparent distress, Oriented x3


HEENT: Atraumatic, Normocephalic, PERRLA


Neck: 2+ carotid pulse no bruit, JVD not distended


Respiratory: Diminished, Dull


Cardiovascular: No edema, Normal pulses, Regular rate/rhythm, Normal S1 S2


Gastrointestinal: Normal bowel sounds, Soft and benign, Non-distended


Musculoskeletal: No clubbing, No swelling


Neurological: Normal speech, Normal strength at 5/5 x4 extr, Normal tone





- Studies


Medications List Reviewed: Yes





Assessment & Plan





- Problems (Diagnosis)


(1) Aspiration pneumonia


Current Visit: Yes   Status: Acute   





(2) Dysphagia


Current Visit: Yes   Status: Acute   


Physician Review: Patient Assessed, Agree with Above Assessment and Plan


Physician Review Additional Text: 





Impression:


Altered mental status secondary to acute toxic encephalopathy with severe sepsis

likely related to acute respiratory failure/right upper lobe aspiration 

pneumonia,  sputum culture positive for E coli


Neutropenia and anemia chronic disease likely related to above


Hypoglycemia with history of type 1 diabetes


Drug abuse history with noted positive drug screen for amphetamines, 

benzodiazepine and THC


Elevated liver function likely related to above


Suspect possible adrenal insufficiency


Severe protein malnutrition with hypocalcemia, hypomagnesia


Thrombocytopenia likely related to sepsis





Plan:





Altered mental status secondary to acute toxic encephalopathy with severe sepsis

likely related to acute respiratory failure/right upper lobe aspiration 

pneumonia, sputum culture positive for E coli:  


-follow plan for EGD today


Follow GI


Repeat swallow evaluation shows no aspiration


Continue all NG tube feeding


Platelet remained stable at 68


Continue antibiotics per Pulmonary


Follow PT and OT evaluation 





Pancytopenia-possibly due to bone marrow suppression


-platelets remained stable at 68 now


Hemoglobin stable bed but low as 7.5


Neutropenia resolved 


 


Hypoglycemia with history of type 1 diabetes:  Continue to adjust medications.  

Accu-Cheks monitored. 





Drug abuse history with noted positive drug screen for amphetamines, 

benzodiazepine and THC:  Mother reports patient still abuses medication. 


Continue to monitor for withdrawal.


Elevated liver function likely related to above:  Monitor liver function test 

closely.  Will obtain hepatitis and HIV panel.  Will discuss with GI


Suspect possible adrenal insufficiency:  Patient now on Decadron.  Continue to 

wean off Decadron


Severe protein malnutrition with hypocalcemia and hypomagnesia:  Continue as 

above.


Possible sepsis-in setting of presumed aspiration pneumonia and neutropenia-WBC 

improved, continue antibiotics


Continue to wean O2


Thrombocytopenia:   Lovenox discontinued.  stable now at 68








Disposition-follow EGD findings , will determine based on  outcome of EGD.

## 2020-09-05 NOTE — ENDO RPT
[address], [address]





EGD WITH PEG PROCEDURE REPORT        EXAM DATE: 09/05/2020



PATIENT NAME:          Ranjeet Leos          MR #:       [id number]

YOB: 1970     VISIT #:     [id number]

ATTENDING:     Hawk Mena Dr     STATUS:     inpatient

ASSISTANT:      Diana Shelton RN and Marisela Wong CST



INDICATIONS:  The patient is a 49 yr old Male here for an EGD with PEG due to

dysphagia, aspiration pneumonia, and protein-calorie malnutrition (albumin 1.5)



PROCEDURE PERFORMED:     EGD-PEG



MEDICATIONS:     Per Anesthesia.

TOPICAL ANESTHETIC:     none



CONSENT: The patient understands the risks and benefits of the procedure and

understands that these risks include, but are not limited to: sedation,

allergic reaction, infection, perforation and/or bleeding. Alternative means of

evaluation and treatment include, among others: physical exam, x-rays, and/or

surgical intervention. The patient elects to proceed with this endoscopic

procedure.



DESCRIPTION OF PROCEDURE:  During intra-op preparation period all mechanical 

medical equipment was checked for proper function. Hand hygiene and appropriate

measures for infection prevention was taken. After the risks, benefits and

alternatives of the procedure were thoroughly explained, Informed consent was

verified, confirmed and timeout was successfully executed by the treatment

team. The patient was anesthetized with topical anesthesia and the EG-[id number]

([id number]) endoscope was introduced through the mouth and advanced to the second

portion of the duodenum.  The instrument was slowly withdrawn as the mucosa was

fully examined.



A stricture was found in the lower esophagus, only able to pass after Dobhoff

tube removed.   A small hiatal hernia was found Multiple (3) small 2-3 mm

ulcers with minute amounts of heme were found in the antrum.   Duodenitis was

found in the bulb of the duodenum.   Multiple (

 8) shallow clean-based 2-6 mm

ulcers were found in the bulb of the duodenum.



The stomach was then inflated with air, and by a combination of

transillumination and manual palpation, the site for the gastrostomy tube

placement was selected and marked on the anterior abdominal wall.  The skin of

the anterior abdomen was surgically prepped and draped with sterile towels.



Utilizing strict sterile technique, the selected site was then anesthetized with

1% xylocaine by injection into the skin and subcutaneous tissue.  A 1 cm

incision was made through the skin and subcutaneous tissue, and the

needle/cannula assembly was then passed through the abdominal wall and through

the anterior wall of the stomach, maintaining visualization with the endoscope.

A snare device previously placed through the instrument channel was then

opened and placed around the cannula, the needle was removed, and the insertion

wire was passed through the cannula and into the stomach lumen.  The snare was

then loosened from the cannula, and repositioned to snare the insertion wire.

The snare was then pulled up to the endoscope distal tip, and the scope was

then withdrawn bringing with it the snare and insertion wire.



The insertion wire was then released from the snare, and the PEG PUSH

gastrostomy tube placed over the guidewire.  Using the push technique, the

tube was then pushed into place over the insertion wire at the abdominal wall

end.        The tube insertion site was then cleansed once again, and the

external bolster was placed over the tube to secure it to the abdominal wall.

A sterile dressing was then applied, and the procedure terminated.



Retroflexed views revealed a small hiatal hernia.  The gastroscope was then

slowly withdrawn and removed.







ADVERSE EVENT:     There were no complications.

IMPRESSIONS:     1.  Status post 20 Fr percutaneous endoscopic gastrostomy

2.  Moderate stricture in the lower esophagus, only able to pass after Dobhoff

tube removed

3.  Small hiatal hernia

4.  Multiple (3) small 2-3 mm ulcers with minute amounts of heme in the antrum

5.  Duodenitis in the bulb of the duodenum

6.  Multiple (

 8) shallow clean-based 2-6 mm ulcers in the bulb of the duodenum





RECOMMENDATIONS:     1.  await biopsy results

2.  acid suppression therapy

3.  follow-up of helicobacter pylori status, treat if indicated

REPEAT EXAM:     Return in 2 month(s) for EGD.





___________________________________

Hawk Mena Dr

eSigned:  Hawk Mena Dr 09/05/2020 3:18 PM





cc: Kevin Elmore D.O.



CPT CODES:

ICD9 CODES:





PATIENT NAME:  Ranjeet Leos

MR#: [id number]

## 2020-09-06 LAB
BUN BLD-MCNC: 30 MG/DL (ref 7–18)
GLUCOSE SERPLBLD-MCNC: 127 MG/DL (ref 74–106)
HCT VFR BLD CALC: 21.6 % (ref 39.6–49)
INR BLD: 1.68
LYMPHOCYTES # SPEC AUTO: 1 K/UL (ref 0.7–4.9)
MAGNESIUM SERPL-MCNC: 1.9 MG/DL (ref 1.8–2.4)
PMV BLD: 9.2 FL (ref 7.6–11.3)
POTASSIUM SERPL-SCNC: 4.3 MMOL/L (ref 3.5–5.1)
RBC # BLD: 2.47 M/UL (ref 4.33–5.43)

## 2020-09-06 RX ADMIN — SODIUM CHLORIDE SCH MLS: 9 INJECTION, SOLUTION INTRAVENOUS at 10:08

## 2020-09-06 RX ADMIN — HUMAN INSULIN SCH: 100 INJECTION, SOLUTION SUBCUTANEOUS at 06:00

## 2020-09-06 RX ADMIN — AMOXICILLIN AND CLAVULANATE POTASSIUM SCH MG: 500; 125 TABLET, FILM COATED ORAL at 17:45

## 2020-09-06 RX ADMIN — THIAMINE HYDROCHLORIDE SCH MG: 100 INJECTION, SOLUTION INTRAMUSCULAR; INTRAVENOUS at 08:39

## 2020-09-06 RX ADMIN — MORPHINE SULFATE PRN MG: 2 INJECTION, SOLUTION INTRAMUSCULAR; INTRAVENOUS at 01:58

## 2020-09-06 RX ADMIN — MORPHINE SULFATE PRN MG: 2 INJECTION, SOLUTION INTRAMUSCULAR; INTRAVENOUS at 10:48

## 2020-09-06 RX ADMIN — HUMAN INSULIN SCH: 100 INJECTION, SOLUTION SUBCUTANEOUS at 17:46

## 2020-09-06 RX ADMIN — PANTOPRAZOLE SODIUM SCH MG: 40 GRANULE, DELAYED RELEASE ORAL at 17:45

## 2020-09-06 RX ADMIN — MORPHINE SULFATE PRN MG: 2 INJECTION, SOLUTION INTRAMUSCULAR; INTRAVENOUS at 19:49

## 2020-09-06 RX ADMIN — MORPHINE SULFATE PRN MG: 2 INJECTION, SOLUTION INTRAMUSCULAR; INTRAVENOUS at 14:44

## 2020-09-06 RX ADMIN — FOLIC ACID SCH MG: 1 TABLET ORAL at 21:39

## 2020-09-06 RX ADMIN — MORPHINE SULFATE PRN MG: 2 INJECTION, SOLUTION INTRAMUSCULAR; INTRAVENOUS at 06:30

## 2020-09-06 RX ADMIN — Medication SCH ML: at 21:39

## 2020-09-06 RX ADMIN — PIPERACILLIN SODIUM AND TAZOBACTAM SODIUM SCH MLS: 3; .375 INJECTION, POWDER, LYOPHILIZED, FOR SOLUTION INTRAVENOUS at 00:04

## 2020-09-06 RX ADMIN — PIPERACILLIN SODIUM AND TAZOBACTAM SODIUM SCH MLS: 3; .375 INJECTION, POWDER, LYOPHILIZED, FOR SOLUTION INTRAVENOUS at 08:39

## 2020-09-06 RX ADMIN — DEXTROSE AND SODIUM CHLORIDE SCH: 5; .45 INJECTION, SOLUTION INTRAVENOUS at 07:40

## 2020-09-06 RX ADMIN — Medication SCH ML: at 08:39

## 2020-09-06 RX ADMIN — HUMAN INSULIN SCH UNIT: 100 INJECTION, SOLUTION SUBCUTANEOUS at 12:26

## 2020-09-06 RX ADMIN — HUMAN INSULIN SCH: 100 INJECTION, SOLUTION SUBCUTANEOUS at 00:00

## 2020-09-06 NOTE — P.PN
Subjective


Date of Service: 09/07/20


Primary Care Provider: unknown


Chief Complaint: Aspiration pneumonia


 patient is doing much better he now has a PEG tube saturation satisfactory





Review of Systems


General: Weakness


Respiratory: Shortness of Breath





Physical Examination





- Vital Signs


Temperature: 96.5 F


Blood Pressure: 111/76


Pulse: 62


Respirations: 18


Pulse Ox (%): 94





- Physical Exam


General: Alert, In no apparent distress, Oriented x3


Cardiovascular: No edema


Gastrointestinal: Normal bowel sounds





- Studies


Medications List Reviewed: Yes





Assessment & Plan





- Problems (Diagnosis)


(1) Pneumonia


Current Visit: Yes   Status: Acute   


Plan: 


P patient is clinically improving white count is normal change to PEG tube 

Augmentin change to prednisone and mg twice a day why the PEG tube blood sugars 

satisfactory is been is significant clearing of his pneumonia on the x-ray


Qualifiers: 


   Pneumonia type: due to unspecified organism   Laterality: bilateral 


Physician Review: Patient Assessed, Agree with Above Assessment and Plan


Physician Review Additional Text: 





I

## 2020-09-06 NOTE — P.PN
Subjective


Date of Service: 09/06/20


Primary Care Provider: unknown


Chief Complaint: Aspiration pneumonia


Subjective: No new changes, New changes (- s/p placed peg tube yesterday  - 

feels fine this am  -anxious to go home , worried about his mother at a motel 

room)





Physical Examination





- Vital Signs


Temperature: 96.5 F


Blood Pressure: 111/76


Pulse: 62


Respirations: 18


Pulse Ox (%): 94





- Physical Exam


General: Alert, In no apparent distress, Oriented x3


HEENT: Atraumatic, Normocephalic, PERRLA


Neck: 2+ carotid pulse no bruit, JVD not distended


Respiratory: Clear to auscultation bilaterally, Normal air movement


Cardiovascular: Regular rate/rhythm, Normal S1 S2, Abnormal S3, Edema (upper 

extremity edema )


Gastrointestinal: Normal bowel sounds, Soft and benign, Non-distended, Other 

(peg tube insitu )


Musculoskeletal: No clubbing, No swelling


Integumentary: No rashes, No breakdown


Neurological: Normal speech, Normal strength at 5/5 x4 extr





- Studies


Medications List Reviewed: Yes





Assessment & Plan





- Problems (Diagnosis)


(1) Aspiration pneumonia


Current Visit: Yes   Status: Acute   





(2) Dysphagia


Current Visit: Yes   Status: Acute   


Physician Review Additional Text: 





Impression:


Altered mental status secondary to acute toxic encephalopathy with severe sepsis

likely related to acute respiratory failure/right upper lobe aspiration 

pneumonia,  sputum culture positive for E coli


Duodenitis with ulcers 


Distal eosphageal stricture with dysphagia


Neutropenia and anemia chronic disease likely related to above


Hypoglycemia with history of type 1 diabetes


Drug abuse history with noted positive drug screen for amphetamines, 

benzodiazepine and THC


Elevated liver function likely related to above


Suspect possible adrenal insufficiency


Severe protein malnutrition with hypocalcemia, hypomagnesia


Thrombocytopenia likely related to sepsis





Plan:





Altered mental status secondary to acute toxic encephalopathy with severe sepsis

likely related to acute respiratory failure/right upper lobe aspiration 

pneumonia, sputum culture positive for E coli:  


-c/w abx 


-will switch abx to Augmentin per pulmonary 


-wean steroids after switch to  prednisone 


Follow PT and OT evaluation 





Pancytopenia-possibly due to bone marrow suppression from chronic alcohol use 

and GI blood loss 


-Platelets improved to 95 , c/w to hold lovenox 


-Hemoglobin stable at  7.5


-Neutropenia resolved 


-c/w magnesium , vit b12, folate and thiamine- swithc to po meds now 


 


Hypoglycemia with history of type 1 diabetes:  Continue to adjust medications.  

Accu-Cheks monitored. 





Drug abuse history with noted positive drug screen for amphetamines, 

benzodiazepine and THC:  Mother reports patient still abuses medication. 


Continue to monitor for withdrawal.








Elevated liver function likely related to alcohol use -c/w to monitor liver 

function test closely.  negative hepatitis and HIV panel





Suspect possible adrenal insufficiency:   Continue to wean off steroids -switch 

Decadron to prednisone





Severe protein malnutrition with hypocalcemia and hypomagnesia:  Continue as 

above.





Possible sepsis-in setting of presumed aspiration pneumonia and neutropenia-WBC 

improved, continue antibiotics as above 


-weaned off  O2 now 





Dysphagia - with esophageal stricture, s/p peg tube placed now 


-follow GI 


-start tube feeding today with jevity 


- if tolerating well , will have staff start teaching of self tube feeding and 

plan for dc in am 


- patient discussed with today, he states inability to afford tube feeding 

sources, will consult case mgt and social work to help arrnage for vouchers 











Dispo - can dc home if tolerating feeding and social work eval for home feeds 

affordability

## 2020-09-07 LAB
HCT VFR BLD CALC: 21.1 % (ref 39.6–49)
HCT VFR BLD CALC: 23.1 % (ref 39.6–49)
INR BLD: 1.5
IRON SERPL-MCNC: 56 UG/DL (ref 65–175)
LYMPHOCYTES # SPEC AUTO: 0.6 K/UL (ref 0.7–4.9)
LYMPHOCYTES # SPEC AUTO: 1.1 K/UL (ref 0.7–4.9)
PMV BLD: 9.2 FL (ref 7.6–11.3)
PMV BLD: 9.4 FL (ref 7.6–11.3)
RBC # BLD: 2.4 M/UL (ref 4.33–5.43)
RBC # BLD: 2.62 M/UL (ref 4.33–5.43)
RBC # BLD: 2.62 M/UL (ref 4.33–5.43)

## 2020-09-07 RX ADMIN — Medication SCH CAP: at 08:55

## 2020-09-07 RX ADMIN — PANTOPRAZOLE SODIUM SCH MG: 40 GRANULE, DELAYED RELEASE ORAL at 08:54

## 2020-09-07 RX ADMIN — MORPHINE SULFATE PRN MG: 2 INJECTION, SOLUTION INTRAMUSCULAR; INTRAVENOUS at 00:14

## 2020-09-07 RX ADMIN — FOLIC ACID SCH MG: 1 TABLET ORAL at 08:55

## 2020-09-07 RX ADMIN — HUMAN INSULIN SCH: 100 INJECTION, SOLUTION SUBCUTANEOUS at 00:00

## 2020-09-07 RX ADMIN — MORPHINE SULFATE PRN MG: 2 INJECTION, SOLUTION INTRAMUSCULAR; INTRAVENOUS at 11:49

## 2020-09-07 RX ADMIN — FOLIC ACID SCH MG: 1 TABLET ORAL at 21:07

## 2020-09-07 RX ADMIN — HUMAN INSULIN SCH UNIT: 100 INJECTION, SOLUTION SUBCUTANEOUS at 06:31

## 2020-09-07 RX ADMIN — PANTOPRAZOLE SODIUM SCH MG: 40 GRANULE, DELAYED RELEASE ORAL at 17:17

## 2020-09-07 RX ADMIN — MORPHINE SULFATE PRN MG: 2 INJECTION, SOLUTION INTRAMUSCULAR; INTRAVENOUS at 17:18

## 2020-09-07 RX ADMIN — Medication SCH MG: at 08:55

## 2020-09-07 RX ADMIN — HUMAN INSULIN SCH UNIT: 100 INJECTION, SOLUTION SUBCUTANEOUS at 19:25

## 2020-09-07 RX ADMIN — Medication SCH PKT: at 21:00

## 2020-09-07 RX ADMIN — AMOXICILLIN AND CLAVULANATE POTASSIUM SCH MG: 500; 125 TABLET, FILM COATED ORAL at 17:18

## 2020-09-07 RX ADMIN — MORPHINE SULFATE PRN MG: 2 INJECTION, SOLUTION INTRAMUSCULAR; INTRAVENOUS at 04:51

## 2020-09-07 RX ADMIN — Medication SCH: at 17:00

## 2020-09-07 RX ADMIN — AMOXICILLIN AND CLAVULANATE POTASSIUM SCH MG: 500; 125 TABLET, FILM COATED ORAL at 08:54

## 2020-09-07 RX ADMIN — HUMAN INSULIN SCH: 100 INJECTION, SOLUTION SUBCUTANEOUS at 11:46

## 2020-09-07 RX ADMIN — Medication SCH ML: at 21:00

## 2020-09-07 RX ADMIN — MORPHINE SULFATE PRN MG: 2 INJECTION, SOLUTION INTRAMUSCULAR; INTRAVENOUS at 21:04

## 2020-09-07 RX ADMIN — Medication SCH ML: at 08:55

## 2020-09-07 NOTE — CON
Date of Consultation:  2020



Reason For Consultation:  Dysphagia with aspiration pneumonia and protein calorie malnutrition.



History Of Present Illness:  The patient is a 49-year-old white male with history of type 2 diabetes,
 drug abuse or alcohol abuse.  Decreased p.o. intake due to his alcohol and prior alcoholic pancreati
tis.  He presented to [facility] due to altered mental status, hypoglycemia, found to have p
neumonia.  He was brought to the hospital with mental status changes.  Disoriented with some hypoglyc
emia.  His white count was low at 1.1.  He was with low potassium, low magnesium.  He has reported sm
oking amphetamines for least the past 5 days.  Also, drinking and also tobacco for 5+ days as well.  
Blood glucoses in the 40s and glucose was given.  The patient is admitted to the hospital.  He also b
y chart review has a history of alcoholic pancreatitis. 



The patient had a swallow study evaluation where he passed.  However, he had aspiration pneumonia.  A
s per chart review, this was a pretty obvious aspiration pneumonia.  The patient states he has swallo
wed sometimes but not always.  Also, his albumin to be low at 1.5.



Past Medical History:  Significant for:

1.Diabetes type 2, diagnosed .

2.Alcohol abuse.

3.Drug abuse including methamphetamine recently.



Social History:  He is  and has 2 sons, ages 28 and 13.  He smokes tobacco.  There is 

amphetamine he smokes as well as alcohol.  Multiple types, heavy use.



Family History:  Father  of gunshot wound at approximately the age of 50.  Mother is alive and we
ll.  .



Medications:  At home there are none except for his recreational drugs including alcohol and methamph
etamine.



Review of Systems:

The patient has disorientation, but he denies weakness.  However, he denies any fevers, chills, night
 sweats, heat or cold intolerance, melena, hematochezia, coffee-ground hematuria, dysuria, polydipsia
, chest pain, short of breath, seizure, syncope, lower extremity edema, muscle aches, joint aches, ba
ckaches.  He does have some swelling in his extremities and his face as well which he cannot explain 
though probably due to hypoalbuminemia.  No depression, anxiety, though he has multiple drug uses ind
icative of probable mood disorder.



Physical Examination:

Vital Signs:  The patient is 5 feet 5 inches, 100 pounds, BMI of 16 kg/m2. 

General:  He is a thin male, lying in bed, in no acute distress with edema of his face noted and hand
s apparently his feet as well. 

HEENT:  Normocephalic, atraumatic.  Anicteric.  Pupils are equal, round, and reactive to light.  Extr
aocular movements intact.  Oropharynx is clear.  The patient did have edema of the face __________ es
pecially.  Oropharynx is clear except poor dentition. 

Neck:  Supple.  No masses.  

Lungs:  Respirations with good airway movement. 

Cardiac:  Regular rate and rhythm.  Gastrointestinal:  Positive bowel sounds.  Soft, nondistended.  N
o hepatosplenomegaly.  No peritoneal or Kwon sign.  No rebound.  

Extremities:  Maybe some mild clubbing.  No cyanosis.  Edema in the hands, feet and face are noted, m
ild 1+. 

Neuro:  Alert and oriented x3.  Able to move all extremities well.  Sensation intact to light touch.



Laboratory Data:  The patient has a low white count of 8.3 on admission and it was __________ but now
 white count is at 8.3, hemoglobin has decreased from admission about 12.3 down to 7.6 today, hematoc
rit of 22, MCV of 87.5, platelet count of 68.  On admission, his platelet count was 290, decreased wi
th heparin use, which has been stopped.  91% polys.  On admission __________ polys, lymphocytes 7%, m
onocytes 1%, eosinophils 0.  PT of 21.2, INR of 1.82, PTT of 33.0.  Sodium 143, potassium 4.0, chlori
de 112, BUN 25, creatinine of 0.64, glucose 87, calcium 7.6, magnesium 1.7.  LDH of 402.  Pre-albumin
 of 7.5, which is low normal __________.  The patient had a calcium 7.3, total bilirubin 2.3, direct 
bilirubin 0.3, AST of 14, ALT of 34, alkaline phosphatase 148, total protein 4.4, albumin 1.5, globul
in 2.9. 



CT abdomen and pelvis reveals extensive consolidative ground-glass opacities throughout the visualize
d lung bases, right greater than left, suggestive of infectious process, chronic pneumonia, and promi
nent fluid-filled small bowel loops scattered throughout the abdomen, could be mild enteritis, periph
eral wedge-shaped areas of low attenuation in both kidneys, may be secondary to scarring or possible 
inflammation.  UA on 29 revealed 1+ glucose, trace ketones, 3+ blood, negative nitrite, leukocyte est
erase, 10-20 RBCs, no white blood cells, no squamous epithelial cells, moderate calcium oxalate cryst
als, 20-50 bacteria, few hyaline casts, 2+ mucus, 2+ protein.  Toxicology positive for amphetamine, b
enzodiazepines and __________ cannabis.  Salicylates was less than 1.7.  Hepatitis C antibody is reac
tive, but the PCR was negative for hepatitis C negative.  HIV negative.  Hepatitis B core IgM antibod
y, hepatitis B surface antigen negative.  Hepatitis C IgM antibody negative as well and COVID-19 test
ing was negative.



Impression:  

1.Dysphagia with aspiration.  Will need alternative enteral feedings such as PEG tube.

2.Protein calorie malnutrition with albumin of 1.5, unable to evaluate due to his alcohol abuse.

3.Alcohol abuse.

4.Malnutrition.

5.Recent methamphetamine use.

6.Hypomagnesemia and hypokalemia noted on admission, corrected with therapy since admission.

7.White count of 1.1 on admission with absolute neutrophils of 0.8 so with some 1% neutrophils, whic
h is indicative of neutropenia, resolved with therapy since admission.

8.History of diabetes type 2, alcohol abuse and drug abuse including methamphetamine and alcoholic p
ancreatitis in the past.



Recommendation:  

1.Proceed with EGD with PEG tube placed.

2.Continue IV fluids and IV antibiotics.

3.Check IV pre-albumin level, which went down, prealbumin is 7.5, normal is 20 to 40.

4.Keep patient n.p.o. until EGD and PEG tube.

5.Placed off tube and start tube feeds until patient can be off Lovenox for appropriate time.

6.Hold Lovenox.

7.Thiamine and folate.

8.DT precautions.

9.Alcoholic anonymous on rehab for multiple substance abuse and discharge from hospital.





SKY/EDE

DD:  2020 15:48:48Voice ID:  [id number]

DT:  2020 19:59:44Report ID:  [id number]

## 2020-09-07 NOTE — P.PN
Subjective


Date of Service: 09/07/20





Patient doing well with physical therapy.  He has started his tube feedings.  

His mother is really not able the help him with this.  He lives out of town and 

he has evactuated to our area.  He is currently living in a motel.  Will teach 

him how to do bolus feeding since that is easier at this time.  He really is not

able to get his caloric intake because of the esophageal stricture that he has. 

He is able to drink a little bit at a time and the liquid is able to work its 

way down into his stomach.  Will teach bolus feedings and then we will 

anticipate discharge home tomorrow.  He will follow with his primary care 

provider once he gets back into town.  He states that in his [place] they have water

and electricity now.  He is anticipating going back there whenever he is 

discharged from the hospital.





Review of Systems


10-point ROS is otherwise unremarkable





Physical Examination





- Vital Signs


Temperature: 98.4 F


Blood Pressure: 122/76


Pulse: 68


Respirations: 17


Pulse Ox (%): 98





- Physical Exam


General: Alert, In no apparent distress, Oriented x3


Respiratory: Clear to auscultation bilaterally, Normal air movement


Cardiovascular: Regular rate/rhythm, Normal S1 S2, No murmurs


Gastrointestinal: Normal bowel sounds, Soft and benign, Non-distended, No 

tenderness


Musculoskeletal: No clubbing, No swelling, No tenderness


Neurological: Sensation intact, Cranial nerves 3-12 intact





- Studies


Medications List Reviewed: Yes





Assessment & Plan





- Problems (Diagnosis)


(1) Esophageal stricture


Current Visit: Yes   Status: Acute   





(2) Weight loss


Current Visit: Yes   Status: Acute   





(3) Protein-calorie malnutrition, severe


Current Visit: Yes   Status: Acute   





(4) H/O caloric malnutrition


Current Visit: Yes   Status: Acute   





(5) Aspiration pneumonia


Current Visit: Yes   Status: Acute   





(6) Dysphagia


Current Visit: Yes   Status: Acute   





(7) Anemia


Current Visit: Yes   Status: Acute   


Qualifiers: 


   Anemia type: other cause 





(8) Substance abuse


Current Visit: Yes   Status: Acute   





- Plan





Plan:


1. Monitor H&H; will transfuse as necessary.  Will also check iron, B12, folate,

and reticulocyte count.  Type and screen for 1 unit of packed red blood cells.


2. Change continuing tube feedings to bolus feedings and make sure patient gets 

4 cans daily to equal 1028 cc.  Will teach patient how to do this.  He is living

at a motel with his mother who is elderly.  Will also give 200 cc  of free water

flushes after each can is completed


3. Continue with antibiotic therapy


4. Continue monitor respiratory status closely


5. Continue monitoring electrolytes


6. GI and DVT prophylaxis


Discharge Plan: Home


Plan to discharge in: 48 Hours





- Advance Directives


Does patient have a Living Will: No


Does patient have a Durable POA for Healthcare: No





- Code Status/Comfort Care


Code Status Assessed: Yes


Code Status: Full Code


Physician Review: Patient Assessed, Agree with Above Assessment and Plan


Critical Care: No


Time Spent Managing PTS Care (In Minutes): 35

## 2020-09-07 NOTE — P.PN
Subjective


Date of Service: 09/07/20


Primary Care Provider: unknown


Chief Complaint: Dysphagia, aspiration pneumonia, prot-deisi malnutrition


Subjective: New changes (Tolerating TFs.)





Physical Examination





- Vital Signs


Temperature: 97.4 F


Blood Pressure: 131/82


Pulse: 60


Respirations: 18


Pulse Ox (%): 99





- Studies


Medications List Reviewed: Yes





Assessment And Plan





- Current Problems (Diagnosis)


(1) Anemia


Current Visit: Yes   Status: Acute   


Qualifiers: 


   Anemia type: other cause 





(2) Aspiration pneumonia


Current Visit: Yes   Status: Acute   





(3) Dysphagia


Current Visit: Yes   Status: Acute   





(4) Esophageal stricture


Current Visit: Yes   Status: Acute   





(5) Protein-calorie malnutrition, severe


Current Visit: Yes   Status: Acute   





(6) Respiratory failure


Current Visit: Yes   Status: Acute   


Qualifiers: 


   Chronicity: acute 





(7) Substance abuse


Current Visit: Yes   Status: Acute   





(8) Weight loss


Current Visit: Yes   Status: Acute   





- Plan


REC: 1) continue TFs 


2) monitor labs 





Physician Review: Patient Assessed, Agree with Above Assessment and Plan


Physician Review Additional Text: 





I

## 2020-09-08 VITALS — SYSTOLIC BLOOD PRESSURE: 110 MMHG | DIASTOLIC BLOOD PRESSURE: 60 MMHG

## 2020-09-08 VITALS — OXYGEN SATURATION: 98 %

## 2020-09-08 VITALS — TEMPERATURE: 97.6 F

## 2020-09-08 LAB
ALBUMIN SERPL BCP-MCNC: 1.8 G/DL (ref 3.4–5)
ALP SERPL-CCNC: 361 U/L (ref 45–117)
ALT SERPL W P-5'-P-CCNC: 53 U/L (ref 12–78)
AST SERPL W P-5'-P-CCNC: 70 U/L (ref 15–37)
BUN BLD-MCNC: 30 MG/DL (ref 7–18)
GLUCOSE SERPLBLD-MCNC: 183 MG/DL (ref 74–106)
HCT VFR BLD CALC: 20.6 % (ref 39.6–49)
HCT VFR BLD CALC: 21.5 % (ref 39.6–49)
HCT VFR BLD CALC: 25.1 % (ref 39.6–49)
INR BLD: 1.46
LYMPHOCYTES # SPEC AUTO: 0.8 K/UL (ref 0.7–4.9)
MAGNESIUM SERPL-MCNC: 2.1 MG/DL (ref 1.8–2.4)
NT-PROBNP SERPL-MCNC: 879 PG/ML (ref ?–125)
PMV BLD: 9 FL (ref 7.6–11.3)
POTASSIUM SERPL-SCNC: 4.8 MMOL/L (ref 3.5–5.1)
RBC # BLD: 2.34 M/UL (ref 4.33–5.43)

## 2020-09-08 RX ADMIN — FOLIC ACID SCH MG: 1 TABLET ORAL at 09:05

## 2020-09-08 RX ADMIN — MORPHINE SULFATE PRN MG: 2 INJECTION, SOLUTION INTRAMUSCULAR; INTRAVENOUS at 01:02

## 2020-09-08 RX ADMIN — AMOXICILLIN AND CLAVULANATE POTASSIUM SCH MG: 500; 125 TABLET, FILM COATED ORAL at 09:05

## 2020-09-08 RX ADMIN — HYDRALAZINE HYDROCHLORIDE PRN MG: 20 INJECTION INTRAMUSCULAR; INTRAVENOUS at 15:21

## 2020-09-08 RX ADMIN — HUMAN INSULIN SCH UNIT: 100 INJECTION, SOLUTION SUBCUTANEOUS at 05:31

## 2020-09-08 RX ADMIN — HUMAN INSULIN SCH UNIT: 100 INJECTION, SOLUTION SUBCUTANEOUS at 01:02

## 2020-09-08 RX ADMIN — Medication SCH MG: at 09:05

## 2020-09-08 RX ADMIN — PANTOPRAZOLE SODIUM SCH MG: 40 GRANULE, DELAYED RELEASE ORAL at 17:14

## 2020-09-08 RX ADMIN — Medication SCH CAP: at 09:05

## 2020-09-08 RX ADMIN — Medication SCH ML: at 12:44

## 2020-09-08 RX ADMIN — Medication SCH PKT: at 09:06

## 2020-09-08 RX ADMIN — AMOXICILLIN AND CLAVULANATE POTASSIUM SCH MG: 500; 125 TABLET, FILM COATED ORAL at 17:14

## 2020-09-08 RX ADMIN — MORPHINE SULFATE PRN MG: 2 INJECTION, SOLUTION INTRAMUSCULAR; INTRAVENOUS at 05:30

## 2020-09-08 RX ADMIN — PANTOPRAZOLE SODIUM SCH MG: 40 GRANULE, DELAYED RELEASE ORAL at 09:05

## 2020-09-08 RX ADMIN — Medication SCH ML: at 09:07

## 2020-09-08 RX ADMIN — HUMAN INSULIN SCH: 100 INJECTION, SOLUTION SUBCUTANEOUS at 12:00

## 2020-09-08 RX ADMIN — HUMAN INSULIN SCH: 100 INJECTION, SOLUTION SUBCUTANEOUS at 18:00

## 2020-09-08 RX ADMIN — Medication SCH ML: at 17:27

## 2020-09-09 NOTE — P.DS
Discharge Date: 09/08/20


Primary Care Provider: (in [place], [place])


Disposition: ROUTINE DISCHARGE


Discharge Condition: GOOD


Reason for Admission: Dysphagia, aspiration pneumonia, prot-davin malnutrition


Consultations: 





Gastroenterology; pulmonary





- Problems


(1) Esophageal stricture


Status: Acute   





(2) Weight loss


Status: Acute   





(3) Protein-calorie malnutrition, severe


Status: Acute   





(4) H/O caloric malnutrition


Status: Acute   





(5) Aspiration pneumonia


Status: Acute   





(6) Dysphagia


Status: Acute   





(7) Anemia


Status: Acute   


Qualifiers: 


   Anemia type: other cause 





(8) Substance abuse


Status: Acute   


Brief History of Present Illness: 





Patient is a 49-year-old male with past medical history of diabetes diagnosed in

2011 is brought by EMS to the emergency room with complaints of altered mental 

status and hypoglycemia.  In the emergency room patient is noted to have a low 

white cell count, hypokalemia, hypomagnesemia.  Has been smoking methamphetamine

for 5 days.  Has been drinking alcohol of all kinds for 5 days or greater.  He 

is found with a blood glucose in the 40s and given an amp of glucose in the 

ambulance.  Patient also has a history of pancreatitis likely from alcohol 

abuse.





In the emergency room chest x-ray and chest CT shows likely bacterial pneumonia.

 Blood work shows a white cell count of 1.1 with bands of 12.  Elevated liver 

enzymes with negative salicylates and negative acetamenophen.  His lactic acid 

is 5.9.





Patient was bolused IV fluids is started on IV antibiotics.  He improved.  Was 

found with blood glucose in the 40s.  Last check blood glucose was 86.  Patient 

is not a good source of information.  Patient will be admitted to the ICU and 

further evaluated. 


Hospital Course: 





Patient is doing well during his hospitalization.  Patient is clinically doing 

much better.  He had a PEG tube placed and is tolerating his tube feedings.  

Because he was from out of town, we went ahead and taught him how to do his 

bolus feedings.  IV antibiotics have helped his pneumonia heal.  We have 

switched to oral antibiotics.  He is doing well with physical therapy.  At this 

time, he is stable for discharge to follow up with outpatient PCP and outpatient

Gastroenterology.


Vital Signs/Physical Exam: 














Temp Pulse Resp BP Pulse Ox


 


 97.6 F   87   18   110/60   98 


 


 09/08/20 16:00  09/08/20 17:55  09/08/20 16:00  09/08/20 17:55  09/08/20 16:00








General: Alert, In no apparent distress, Oriented x3


Laboratory Data at Discharge: 














WBC  6.0 K/uL (4.3-10.9)  D 09/08/20  05:30    


 


Hgb  8.7 g/dL (13.6-17.9)  L  09/08/20  17:23    


 


Hct  25.1 % (39.6-49.0)  L D 09/08/20  17:23    


 


Plt Count  137 K/uL (152-406)  L  09/08/20  05:30    


 


PT  17.1 SECONDS (9.5-12.5)  H  09/08/20  05:30    


 


INR  1.46   09/08/20  05:30    


 


APTT  37.5 SECONDS (24.3-36.9)  H  09/08/20  05:30    


 


Sodium  140 mmol/L (136-145)   09/08/20  05:30    


 


Potassium  4.8 mmol/L (3.5-5.1)   09/08/20  05:30    


 


BUN  30 mg/dL (7-18)  H  09/08/20  05:30    


 


Creatinine  0.71 mg/dL (0.55-1.3)   09/08/20  05:30    


 


Glucose  183 mg/dL ()  H  09/08/20  05:30    


 


Phosphorus  3.6 mg/dL (2.5-4.9)   09/08/20  05:30    


 


Magnesium  2.1 mg/dL (1.8-2.4)   09/08/20  05:30    


 


Total Bilirubin  0.4 mg/dL (0.2-1.0)   09/08/20  05:30    


 


AST  70 U/L (15-37)  H  09/08/20  05:30    


 


ALT  53 U/L (12-78)   09/08/20  05:30    


 


Alkaline Phosphatase  361 U/L ()  H  09/08/20  05:30    


 


Lipase  17 U/L ()  L  08/28/20  21:34    








Home Medications: 








Amlodipine [Norvasc*] 1 tab PO DAILY 09/03/20 


Lisinopril/Hydrochlorothiazide [Lisinopril-Hctz 20-25 mg Tab] 1 tab PO DAILY 

09/03/20 


Simvastatin 1 tab PO BEDTIME 09/03/20 


Amox/K Clav [Augmentin 600 MG/5 ML Susp] 5 ml PO BID #60 ml 09/08/20 


Folic Acid 1 mg PO DAILY #30 tablet 09/08/20 


Glucerna 1.5 Davin 237 ml FT QID #120 bot 09/08/20 


Methylprednisolone [Medrol dosepack] 4 mg PO AS DIRECTED #1 reg 09/08/20 


Pantoprazole Granules [Protonix Granules*] 40 mg FT BIDAC #60 packet 09/08/20 


Thiamine HCl [Vitamin B-1*] 100 mg FT DAILY #30 tablet 09/08/20 


carvediloL [Coreg*] 3.125 mg PO BID 6AM 6PM #60 tab 09/08/20 


chlordiazePOXIDE HCl [Librium] 5 mg PO Q8H PRN #35 cap 09/08/20 





New Medications: 


Amox/K Clav [Augmentin 600 MG/5 ML Susp] 5 ml PO BID #60 ml


carvediloL [Coreg*] 3.125 mg PO BID 6AM 6PM #60 tab


Folic Acid 1 mg PO DAILY #30 tablet


Glucerna 1.5 Davin 237 ml FT QID #120 bot


chlordiazePOXIDE HCl [Librium] 5 mg PO Q8H PRN #35 cap


 PRN Reason: Agitation


Methylprednisolone [Medrol dosepack] 4 mg PO AS DIRECTED #1 reg


Pantoprazole Granules [Protonix Granules*] 40 mg FT BIDAC #60 packet


Thiamine HCl [Vitamin B-1*] 100 mg FT DAILY #30 tablet


Patient Discharge Instructions: OK TO DC IV AND DC HOME.  FOLLOW-UP WITH PRIMARY

CARE PROVIDER IN 1-2 WEEKS.  FOLLOW-UP WITH GASTROENTEROLOGY IN 1-2 WEEKS.  

RETURN TO THE ER IF SYMPTOMS WORSEN.  REFRAIN FROM SUBSTANCE ABUSE.  CAN ONLY DO

LIQUIDS BY MOUTH WITH SIPS UNTIL CLEARED BY GI.  TUBE FEEDINGS AS INSTRUCTED IN 

THE HOSPITAL.  CALL or TEXT DR. LOVE [phone] IF ANY QUESTIONS REGARDING 

HOSPITAL STAY.  PLEASE CALL THE FLOOR [phone] IF ANY MEDICATION OR 

NURSING QUESTIONS.


Diet: Regular


Activity: Fall precautions


Followup: 


Hawk Mena MD [ASSOCIATE-ACTIVE - CAN ADMIT] - 1-2 Weeks


(gatroenterologist- Call to schedule an appointment


__________________________________________________________)


Time spent managing pt's care (in minutes): 35